# Patient Record
Sex: MALE | Race: WHITE | NOT HISPANIC OR LATINO | Employment: FULL TIME | URBAN - METROPOLITAN AREA
[De-identification: names, ages, dates, MRNs, and addresses within clinical notes are randomized per-mention and may not be internally consistent; named-entity substitution may affect disease eponyms.]

---

## 2017-01-30 ENCOUNTER — ALLSCRIPTS OFFICE VISIT (OUTPATIENT)
Dept: OTHER | Facility: OTHER | Age: 52
End: 2017-01-30

## 2017-01-30 ENCOUNTER — GENERIC CONVERSION - ENCOUNTER (OUTPATIENT)
Dept: OTHER | Facility: OTHER | Age: 52
End: 2017-01-30

## 2017-01-30 LAB — HBA1C MFR BLD HPLC: 9.1 %

## 2017-10-09 ENCOUNTER — ALLSCRIPTS OFFICE VISIT (OUTPATIENT)
Dept: OTHER | Facility: OTHER | Age: 52
End: 2017-10-09

## 2017-10-09 ENCOUNTER — GENERIC CONVERSION - ENCOUNTER (OUTPATIENT)
Dept: OTHER | Facility: OTHER | Age: 52
End: 2017-10-09

## 2017-10-09 DIAGNOSIS — J18.9 PNEUMONIA: ICD-10-CM

## 2017-10-09 DIAGNOSIS — E11.9 TYPE 2 DIABETES MELLITUS WITHOUT COMPLICATIONS (HCC): ICD-10-CM

## 2017-10-10 ENCOUNTER — HOSPITAL ENCOUNTER (OUTPATIENT)
Dept: RADIOLOGY | Facility: HOSPITAL | Age: 52
Discharge: HOME | End: 2017-10-10
Payer: COMMERCIAL

## 2017-10-10 ENCOUNTER — TRANSCRIBE ORDERS (OUTPATIENT)
Dept: ADMINISTRATIVE | Facility: HOSPITAL | Age: 52
End: 2017-10-10

## 2017-10-10 DIAGNOSIS — J18.9 PNEUMONIA: ICD-10-CM

## 2017-10-10 DIAGNOSIS — E11.9 TYPE 2 DIABETES MELLITUS WITHOUT COMPLICATIONS (HCC): ICD-10-CM

## 2017-10-10 PROCEDURE — 71020 HB CHEST X-RAY 2VW FRONTAL&LATL: CPT

## 2017-10-11 LAB
BASOPHILS # BLD AUTO: 0 %
BASOPHILS # BLD AUTO: 0 X10E3/UL (ref 0–0.2)
BUN SERPL-MCNC: 24 MG/DL (ref 6–24)
BUN/CREA RATIO (HISTORICAL): 26 (ref 9–20)
CALCIUM SERPL-MCNC: 8.2 MG/DL (ref 8.7–10.2)
CHLORIDE SERPL-SCNC: 84 MMOL/L (ref 96–106)
CO2 SERPL-SCNC: 23 MMOL/L (ref 18–29)
CREAT SERPL-MCNC: 0.94 MG/DL (ref 0.76–1.27)
DEPRECATED RDW RBC AUTO: 13.3 % (ref 12.3–15.4)
EGFR AFRICAN AMERICAN (HISTORICAL): 106 ML/MIN/1.73
EGFR-AMERICAN CALC (HISTORICAL): 92 ML/MIN/1.73
EOSINOPHIL # BLD AUTO: 0.1 X10E3/UL (ref 0–0.4)
EOSINOPHIL # BLD AUTO: 1 %
GLUCOSE SERPL-MCNC: 389 MG/DL (ref 65–99)
HCT VFR BLD AUTO: 41.3 % (ref 37.5–51)
HGB BLD-MCNC: 14 G/DL (ref 12.6–17.7)
IMM.GRANULOCYTES (CD4/8) (HISTORICAL): 0 %
IMM.GRANULOCYTES (CD4/8) (HISTORICAL): 0 X10E3/UL (ref 0–0.1)
LYMPHOCYTES # BLD AUTO: 1 X10E3/UL (ref 0.7–3.1)
LYMPHOCYTES # BLD AUTO: 16 %
MCH RBC QN AUTO: 29.5 PG (ref 26.6–33)
MCHC RBC AUTO-ENTMCNC: 33.9 G/DL (ref 31.5–35.7)
MCV RBC AUTO: 87 FL (ref 79–97)
MONOCYTES # BLD AUTO: 0.9 X10E3/UL (ref 0.1–0.9)
MONOCYTES (HISTORICAL): 14 %
NEUTROPHILS # BLD AUTO: 4.4 X10E3/UL (ref 1.4–7)
NEUTROPHILS # BLD AUTO: 69 %
PLATELET # BLD AUTO: 247 X10E3/UL (ref 150–379)
POTASSIUM SERPL-SCNC: 3.8 MMOL/L (ref 3.5–5.2)
RBC (HISTORICAL): 4.75 X10E6/UL (ref 4.14–5.8)
SODIUM SERPL-SCNC: 126 MMOL/L (ref 134–144)
WBC # BLD AUTO: 6.3 X10E3/UL (ref 3.4–10.8)

## 2017-10-23 ENCOUNTER — GENERIC CONVERSION - ENCOUNTER (OUTPATIENT)
Dept: OTHER | Facility: OTHER | Age: 52
End: 2017-10-23

## 2017-11-24 LAB
CREAT ?TM UR-SCNC: 75.3 UMOL/L
HBA1C MFR BLD HPLC: 11.6 %
MICROALBUMIN UR-MCNC: 6.9 MG/L (ref 0–20)
MICROALBUMIN/CREAT UR: 9.2 MG/G{CREAT}

## 2018-01-10 NOTE — RESULT NOTES
Verified Results  * XR CHEST PA & LATERAL [id number] 12:43PM Andrey Santizo Order Number: [id number]   Performing Comments: Please call report to Dr Eric Stokes Name Result Flag Reference   XR CHEST PA & LATERAL (Report)     CHEST DUAL ENERGY     INDICATION: Fever  Former smoker  COMPARISON: None     VIEWS: PA (including soft tissue and bone algorithms) and lateral projections; 4 images     FINDINGS:        Cardiomediastinal silhouette appears unremarkable  And right lower lobe consolidation consistent with pneumonia  No pneumothorax or pleural effusion  Visualized osseous structures appear within normal limits for the patient's age  IMPRESSION:     Right lower lobe pneumonia  ##imslh##imslh       Workstation performed: [id number]     Signed by:    Cornelia Perez MD   10/10/17

## 2018-01-13 NOTE — RESULT NOTES
Verified Results  (1) CBC/PLT/DIFF 10Oct2017 01:29PM Alfreda Halo     Test Name Result Flag Reference   WBC 6 3 x10E3/uL  3 4-10 8   RBC 4 75 x10E6/uL  4 14-5 80   Hemoglobin 14 0 g/dL  12 6-17 7   Hematocrit 41 3 %  37 5-51 0   MCV 87 fL  79-97   MCH 29 5 pg  26 6-33 0   MCHC 33 9 g/dL  31 5-35 7   RDW 13 3 %  12 3-15 4   Platelets 336 L13A5/CC  150-379   Neutrophils 69 %  Not Estab  Lymphs 16 %  Not Estab  Monocytes 14 %  Not Estab  Eos 1 %  Not Estab  Basos 0 %  Not Estab  Neutrophils (Absolute) 4 4 x10E3/uL  1 4-7 0   Lymphs (Absolute) 1 0 x10E3/uL  0 7-3 1   Monocytes(Absolute) 0 9 x10E3/uL  0 1-0 9   Eos (Absolute) 0 1 x10E3/uL  0 0-0 4   Baso (Absolute) 0 0 x10E3/uL  0 0-0 2   Immature Granulocytes 0 %  Not Estab     Immature Grans (Abs) 0 0 x10E3/uL  0 0-0 1     (1) BASIC METABOLIC PROFILE [id number] 01:29PM Alfreda Halo     Test Name Result Flag Reference   Glucose, Serum 389 mg/dL H 65-99   BUN 24 mg/dL  6-24   Creatinine, Serum 0 94 mg/dL  0 76-1 27   BUN/Creatinine Ratio 26 H 9-20   Sodium, Serum 126 mmol/L L 134-144   Potassium, Serum 3 8 mmol/L  3 5-5 2   Chloride, Serum 84 mmol/L L    Carbon Dioxide, Total 23 mmol/L  18-29   Calcium, Serum 8 2 mg/dL L 8 7-10 2   eGFR If NonAfricn Am 92 mL/min/1 73  >59   eGFR If Africn Am 106 mL/min/1 73  >59

## 2018-01-14 VITALS
DIASTOLIC BLOOD PRESSURE: 76 MMHG | HEIGHT: 71 IN | WEIGHT: 176 LBS | BODY MASS INDEX: 24.64 KG/M2 | SYSTOLIC BLOOD PRESSURE: 142 MMHG | OXYGEN SATURATION: 97 % | HEART RATE: 100 BPM

## 2018-01-15 NOTE — RESULT NOTES
Verified Results  * XR SPINE LUMBAR MINIMUM 4 VIEWS [id number] 01:10PM Fawn Hinton Order Number: [id number]     Test Name Result Flag Reference   XR SPINE LUMBAR MINIMUM 4 VIEWS (Report)     LUMBAR SPINE     INDICATION: Lower back pain  Sciatica, pain into right leg for 4 weeks     COMPARISON: None     VIEWS: AP, lateral, bilateral oblique and coned down projections; 5 images     FINDINGS:     Alignment is unremarkable  There is no radiographic evidence of acute fracture or destructive osseous lesion  Minimal degenerative change in the posterior facets  Lumbarization of S1  Visualized soft tissues appear unremarkable  IMPRESSION:     No acute findings         Workstation performed: [id number]     Signed by:   Val Acosta MD   7/6/16

## 2018-01-22 VITALS
SYSTOLIC BLOOD PRESSURE: 140 MMHG | HEIGHT: 71 IN | BODY MASS INDEX: 22.68 KG/M2 | WEIGHT: 162 LBS | HEART RATE: 104 BPM | DIASTOLIC BLOOD PRESSURE: 100 MMHG | OXYGEN SATURATION: 96 % | TEMPERATURE: 99 F | RESPIRATION RATE: 18 BRPM

## 2018-02-07 DIAGNOSIS — Z13.220 NEED FOR LIPID SCREENING: ICD-10-CM

## 2018-02-07 DIAGNOSIS — F41.0 PANIC DISORDER: Primary | ICD-10-CM

## 2018-02-07 DIAGNOSIS — Z12.5 SCREENING PSA (PROSTATE SPECIFIC ANTIGEN): ICD-10-CM

## 2018-02-07 DIAGNOSIS — E11.9 TYPE 2 DIABETES MELLITUS WITHOUT COMPLICATION, WITHOUT LONG-TERM CURRENT USE OF INSULIN (HCC): ICD-10-CM

## 2018-02-07 DIAGNOSIS — I10 HYPERTENSION, UNSPECIFIED TYPE: Primary | ICD-10-CM

## 2018-02-07 DIAGNOSIS — E78.00 HIGH CHOLESTEROL: ICD-10-CM

## 2018-02-07 PROBLEM — E11.69 ERECTILE DYSFUNCTION ASSOCIATED WITH TYPE 2 DIABETES MELLITUS (HCC): Status: ACTIVE | Noted: 2017-01-30

## 2018-02-07 PROBLEM — N52.1 ERECTILE DYSFUNCTION ASSOCIATED WITH TYPE 2 DIABETES MELLITUS (HCC): Status: ACTIVE | Noted: 2017-01-30

## 2018-02-07 RX ORDER — IMIPRAMINE HCL 50 MG
1 TABLET ORAL 2 TIMES DAILY
COMMUNITY
Start: 2016-06-09 | End: 2018-02-07 | Stop reason: SDUPTHER

## 2018-02-07 RX ORDER — IMIPRAMINE HCL 50 MG
50 TABLET ORAL 2 TIMES DAILY
Qty: 60 TABLET | Refills: 0 | Status: SHIPPED | OUTPATIENT
Start: 2018-02-07 | End: 2018-04-02 | Stop reason: SDUPTHER

## 2018-02-07 NOTE — TELEPHONE ENCOUNTER
Please sign to send to pharmacy    Please add dx, not sure why pt taking med- did not reach pt second time to ask    Pt due labs and ov - called and spoke to pt  Apt sched 3/5, pt will have labs prior

## 2018-02-14 DIAGNOSIS — E11.9 DIABETES MELLITUS WITHOUT COMPLICATION (HCC): Primary | ICD-10-CM

## 2018-03-29 ENCOUNTER — TELEPHONE (OUTPATIENT)
Dept: FAMILY MEDICINE CLINIC | Facility: CLINIC | Age: 53
End: 2018-03-29

## 2018-03-29 LAB
ALBUMIN SERPL-MCNC: 4.3 G/DL (ref 3.5–5.5)
ALBUMIN/CREAT UR: 5.7 MG/G CREAT (ref 0–30)
ALBUMIN/GLOB SERPL: 1.6 {RATIO} (ref 1.2–2.2)
ALP SERPL-CCNC: 60 IU/L (ref 39–117)
ALT SERPL-CCNC: 29 IU/L (ref 0–44)
AST SERPL-CCNC: 21 IU/L (ref 0–40)
BILIRUB SERPL-MCNC: 0.5 MG/DL (ref 0–1.2)
BUN SERPL-MCNC: 16 MG/DL (ref 6–24)
BUN/CREAT SERPL: 15 (ref 9–20)
CALCIUM SERPL-MCNC: 9.3 MG/DL (ref 8.7–10.2)
CHLORIDE SERPL-SCNC: 95 MMOL/L (ref 96–106)
CHOLEST SERPL-MCNC: 200 MG/DL (ref 100–199)
CO2 SERPL-SCNC: 22 MMOL/L (ref 18–29)
CREAT SERPL-MCNC: 1.04 MG/DL (ref 0.76–1.27)
CREAT UR-MCNC: 81.3 MG/DL
EST. AVERAGE GLUCOSE BLD GHB EST-MCNC: 289 MG/DL
GLOBULIN SER-MCNC: 2.7 G/DL (ref 1.5–4.5)
GLUCOSE SERPL-MCNC: 328 MG/DL (ref 65–99)
HBA1C MFR BLD: 11.7 % (ref 4.8–5.6)
HDLC SERPL-MCNC: 44 MG/DL
LDLC SERPL CALC-MCNC: 116 MG/DL (ref 0–99)
LDLC/HDLC SERPL: 2.6 RATIO UNITS (ref 0–3.6)
MICROALBUMIN UR-MCNC: 4.6 UG/ML
POTASSIUM SERPL-SCNC: 4.7 MMOL/L (ref 3.5–5.2)
PROT SERPL-MCNC: 7 G/DL (ref 6–8.5)
PSA SERPL-MCNC: 0.4 NG/ML (ref 0–4)
SL AMB EGFR AFRICAN AMERICAN: 93 ML/MIN/1.73
SL AMB EGFR NON AFRICAN AMERICAN: 80 ML/MIN/1.73
SL AMB VLDL CHOLESTEROL CALC: 40 MG/DL (ref 5–40)
SODIUM SERPL-SCNC: 135 MMOL/L (ref 134–144)
TRIGL SERPL-MCNC: 199 MG/DL (ref 0–149)

## 2018-03-29 NOTE — PROGRESS NOTES
Results reviewed-will discuss at scheduled appointment-ask patient if he is seeing the endocrinologist

## 2018-03-29 NOTE — TELEPHONE ENCOUNTER
PM -- VOLODYMYR    Saw Dudley Jamison 12/1/17, will be following up with Dr Susu Narvaez on 4/30/18 -- has appoinment with PM on 4/2/18      ----- Message from Vidhi Triana MD sent at 3/29/2018  8:30 AM EDT -----  Results reviewed-will discuss at scheduled appointment-ask patient if he is seeing the endocrinologist

## 2018-04-02 ENCOUNTER — OFFICE VISIT (OUTPATIENT)
Dept: FAMILY MEDICINE CLINIC | Facility: CLINIC | Age: 53
End: 2018-04-02
Payer: COMMERCIAL

## 2018-04-02 VITALS
DIASTOLIC BLOOD PRESSURE: 90 MMHG | WEIGHT: 173 LBS | RESPIRATION RATE: 16 BRPM | HEIGHT: 71 IN | BODY MASS INDEX: 24.22 KG/M2 | OXYGEN SATURATION: 99 % | SYSTOLIC BLOOD PRESSURE: 150 MMHG | HEART RATE: 100 BPM

## 2018-04-02 DIAGNOSIS — E11.69 ERECTILE DYSFUNCTION ASSOCIATED WITH TYPE 2 DIABETES MELLITUS (HCC): ICD-10-CM

## 2018-04-02 DIAGNOSIS — Z00.00 ENCOUNTER FOR WELLNESS EXAMINATION: Primary | ICD-10-CM

## 2018-04-02 DIAGNOSIS — N52.1 ERECTILE DYSFUNCTION ASSOCIATED WITH TYPE 2 DIABETES MELLITUS (HCC): ICD-10-CM

## 2018-04-02 DIAGNOSIS — Z23 NEED FOR DIPHTHERIA-TETANUS-PERTUSSIS (TDAP) VACCINE: ICD-10-CM

## 2018-04-02 DIAGNOSIS — E11.69 HYPERLIPIDEMIA ASSOCIATED WITH TYPE 2 DIABETES MELLITUS (HCC): ICD-10-CM

## 2018-04-02 DIAGNOSIS — E78.5 HYPERLIPIDEMIA ASSOCIATED WITH TYPE 2 DIABETES MELLITUS (HCC): ICD-10-CM

## 2018-04-02 DIAGNOSIS — F41.0 PANIC DISORDER: ICD-10-CM

## 2018-04-02 DIAGNOSIS — Z11.59 ENCOUNTER FOR HEPATITIS C SCREENING TEST FOR LOW RISK PATIENT: ICD-10-CM

## 2018-04-02 PROCEDURE — 99396 PREV VISIT EST AGE 40-64: CPT | Performed by: FAMILY MEDICINE

## 2018-04-02 PROCEDURE — 90715 TDAP VACCINE 7 YRS/> IM: CPT

## 2018-04-02 PROCEDURE — 90471 IMMUNIZATION ADMIN: CPT

## 2018-04-02 PROCEDURE — 99214 OFFICE O/P EST MOD 30 MIN: CPT | Performed by: FAMILY MEDICINE

## 2018-04-02 RX ORDER — LISINOPRIL 10 MG/1
TABLET ORAL
COMMUNITY
Start: 2016-06-09 | End: 2019-02-14 | Stop reason: SDUPTHER

## 2018-04-02 RX ORDER — IMIPRAMINE HCL 50 MG
50 TABLET ORAL 2 TIMES DAILY
Qty: 60 TABLET | Refills: 0 | Status: SHIPPED | OUTPATIENT
Start: 2018-04-02 | End: 2018-05-08 | Stop reason: SDUPTHER

## 2018-04-02 RX ORDER — SILDENAFIL 100 MG/1
TABLET, FILM COATED ORAL
COMMUNITY
Start: 2017-01-30 | End: 2018-04-02 | Stop reason: ALTCHOICE

## 2018-04-02 RX ORDER — VARDENAFIL HYDROCHLORIDE 20 MG/1
20 TABLET ORAL DAILY PRN
Qty: 6 TABLET | Refills: 3 | Status: SHIPPED | OUTPATIENT
Start: 2018-04-02 | End: 2019-06-20 | Stop reason: ALTCHOICE

## 2018-04-02 RX ORDER — ATORVASTATIN CALCIUM 40 MG/1
1 TABLET, FILM COATED ORAL DAILY
COMMUNITY
Start: 2016-06-09 | End: 2018-08-23 | Stop reason: SDUPTHER

## 2018-04-02 NOTE — PROGRESS NOTES
Assessment/Plan:    No problem-specific Assessment & Plan notes found for this encounter  Diagnoses and all orders for this visit:    Encounter for wellness examination    Encounter for hepatitis C screening test for low risk patient  -     Hepatitis C antibody; Future  -     Hepatitis C antibody    Need for diphtheria-tetanus-pertussis (Tdap) vaccine  -     Tdap vaccine greater than or equal to 8yo IM    Hyperlipidemia associated with type 2 diabetes mellitus (HCC)    Erectile dysfunction associated with type 2 diabetes mellitus (HCC)  -     vardenafil (LEVITRA) 20 MG tablet; Take 1 tablet (20 mg total) by mouth daily as needed for erectile dysfunction    Panic disorder  -     imipramine (TOFRANIL) 50 mg tablet; Take 1 tablet (50 mg total) by mouth 2 (two) times a day    Other orders  -     atorvastatin (LIPITOR) 40 mg tablet; Take 1 tablet by mouth daily  -     insulin detemir (LEVEMIR FLEXTOUCH) subcutaneous injection pen 100 units/mL; Inject under the skin  -     insulin aspart (NOVOLOG FLEXPEN) 100 Units/mL SOPN; Inject under the skin  -     lisinopril (ZESTRIL) 10 mg tablet; Take by mouth  -     Discontinue: sildenafil (VIAGRA) 100 mg tablet; Take by mouth  -     Insulin Pen Needle (B-D UF III MINI PEN NEEDLES) 31G X 5 MM MISC; by Does not apply route       Medical management - I will forward your laboratory studies to Endocrinology further advice as to adjusting medications  Try to be more compliant with your diet  Also be more compliant with taking your lisinopril on a regular basis as your blood pressure is high  Goal should be less than 130/80  Subjective:   Chief Complaint   Patient presents with    Follow-up     mm - review labs, med refill    discuss meds    Physical Exam               Patient ID: Anant Campbell is a 52 y o  male  Medical management multiple problems-  1) diabetes mellitus-currently seeing endo- A1C at 11%-this is not surprising fasting sugars are usually 200    He admits does not follow his mealtime insulin due to difficulty with testing when on the road  2) hypertension slightly elevated today-patient not taking medication routinely  3) hyperlipidemia-triglycerides are elevated most likely related to poor diabetic control total cholesterol is not too bad   4) anxiety with panic attacks-controlled with a murmur per mean once daily  5) erectile dysfunction-patient has lost effect with Viagra wishes to try Levitra as a replacement  The following portions of the patient's history were reviewed and updated as appropriate: allergies, current medications, past family history, past medical history, past social history and problem list     Review of Systems   Constitutional: Negative for activity change, appetite change, diaphoresis and fatigue  Respiratory: Negative for cough, chest tightness, shortness of breath and wheezing  Cardiovascular: Negative for chest pain, palpitations and leg swelling  Fast or slow heart rate   Gastrointestinal: Negative for abdominal pain, blood in stool, constipation, diarrhea, nausea and vomiting  Genitourinary: Negative for difficulty urinating, dysuria and frequency  Musculoskeletal: Negative for arthralgias, gait problem, joint swelling and myalgias  Neurological: Negative for dizziness, weakness, light-headedness, numbness and headaches  Psychiatric/Behavioral: Negative for agitation, confusion, dysphoric mood and sleep disturbance  The patient is not nervous/anxious  Objective:      /90 (BP Location: Right arm, Patient Position: Sitting, Cuff Size: Standard)   Pulse 100   Resp 16   Ht 5' 11" (1 803 m)   Wt 78 5 kg (173 lb)   SpO2 99%   BMI 24 13 kg/m²          Physical Exam   Constitutional: He is oriented to person, place, and time  He appears well-developed and well-nourished  No distress  HENT:   Head: Normocephalic and atraumatic     Right Ear: Tympanic membrane, external ear and ear canal normal    Left Ear: Tympanic membrane, external ear and ear canal normal    Nose: No mucosal edema or rhinorrhea  Right sinus exhibits no maxillary sinus tenderness  Left sinus exhibits no maxillary sinus tenderness  Mouth/Throat: Uvula is midline  Mucous membranes are not pale and not dry  Normal dentition  No oropharyngeal exudate  Eyes: EOM are normal  Pupils are equal, round, and reactive to light  Right eye exhibits no discharge  Left eye exhibits no discharge  Neck: Normal range of motion  Neck supple  No thyromegaly present  Cardiovascular: Normal rate, regular rhythm, normal heart sounds and intact distal pulses  No murmur heard  Pulmonary/Chest: Effort normal and breath sounds normal  No respiratory distress  He has no wheezes  He has no rales  Musculoskeletal: Normal range of motion  He exhibits no edema or tenderness  Lymphadenopathy:     He has no cervical adenopathy  Neurological: He is alert and oriented to person, place, and time  No cranial nerve deficit  Skin: He is not diaphoretic  Psychiatric: He has a normal mood and affect   His behavior is normal

## 2018-04-02 NOTE — PROGRESS NOTES
HPI:  Mary Arteaga is a 52 y o  male here for his yearly health maintenance exam    Patient Active Problem List   Diagnosis    HTN (hypertension)    High cholesterol    [facility] [address] [facility]    [facility]abetic retinopathy [facility] [address] [facility]    Erectile dysfunction associated with type 2 diabetes mellitus ([facility] [address] [facility]    Panic disorder    Encounter for wellness examination     No past medical history on file  1  Advanced Directive: yes     2  Durable Power of  for Healthcare: yes     3  Social History:               Marital History:               Work Status: full time              Drug and alcohol History: no drug use              Alcohol Use: <1/month     4  General Health: fair              Regular Dental Visits:yes              Vision problems:DM retinopathy              Hearing Loss:no              Immunizations up to date: no                 Lifestyle:                           Healthy Diet:yes                          Tobacco Use:former- quit 2007                          Regular exercise:yes                          Weight concerns:no                          Drug abuse: no     5  Reproductive Health (females only)              Premenopausal:-              Perimenopausal:-              Postmenopausal: -                 Menstrual Problems: -              Contraceptions: -              Sexually Active: -              Pregnancy History:-     6   Over the past 2 weeks, how often have you been bothered by the following:              Little interest or pleasure in doing things: not at all              Felling down, depressed or hopeless: not at all    Current Outpatient Prescriptions   Medication Sig Dispense Refill    atorvastatin (LIPITOR) 40 mg tablet Take 1 tablet by mouth daily      insulin aspart (NOVOLOG FLEXPEN) 100 Units/mL SOPN Inject under the skin      insulin detemir (LEVEMIR FLEXTOUCH) subcutaneous injection pen 100 units/mL Inject under the skin      Insulin Pen Needle (B-D UF III MINI PEN NEEDLES) 31G X 5 MM MISC by Does not apply route      lisinopril (ZESTRIL) 10 mg tablet Take by mouth      sildenafil (VIAGRA) 100 mg tablet Take by mouth      imipramine (TOFRANIL) 50 mg tablet Take 1 tablet (50 mg total) by mouth 2 (two) times a day 60 tablet 0     No current facility-administered medications for this visit  No Known Allergies  Immunization History   Administered Date(s) Administered    Influenza TIV (IM) 09/02/2014    Pneumococcal Polysaccharide PPV23 01/30/2017       Patient Care Team:  Joe Arreaga MD as PCP - General      Physical Exam :  Physical Exam  /90 (BP Location: Right arm, Patient Position: Sitting, Cuff Size: Standard)   Pulse 100   Resp 16   Ht 5' 11" (1 803 m)   Wt 78 5 kg (173 lb)   SpO2 99%   BMI 24 13 kg/m²     General Appearance:    Alert, cooperative, no distress, appears stated age   Head:    Normocephalic, without obvious abnormality, atraumatic   Eyes:    PERRL, conjunctiva/corneas clear, EOM's intact, fundi     benign, both eyes        Ears:    Normal TM's and external ear canals, both ears   Nose:   Nares normal, septum midline, mucosa normal, no drainage    or sinus tenderness   Throat:   Lips, mucosa, and tongue normal; teeth and gums normal   Neck:   Supple, symmetrical, trachea midline, no adenopathy;        thyroid:  No enlargement/tenderness/nodules; no carotid    bruit or JVD   Lungs:     Clear to auscultation bilaterally, respirations unlabored   Chest wall:    No tenderness or deformity   Heart:    Regular rate and rhythm, S1 and S2 normal, no murmur, rub   or gallop   Abdomen:     Soft, non-tender, bowel sounds active all four quadrants,     no masses, no organomegaly   Extremities:   Extremities normal, atraumatic, no cyanosis or edema   Pulses:   2+ and symmetric all extremities   Skin:   Skin color, texture, turgor normal, no rashes or lesions   Lymph nodes:   Cervical and supraclavicular nodes normal   Neurologic:   CNII-XII intact   Normal strength, sensation and reflexes       throughout         Reviewed Updated [facility] Prior Wellness Visits:   Last Health Maintenance visit information was reviewed, patient interviewed , no change since last HM visit yes  Last HM visit information was reviewed, patient interviewed and updates made to the record today yes    Assessment and Plan:  1  Encounter for wellness examination     2  Encounter for hepatitis C screening test for low risk patient     3  Need for diphtheria-tetanus-pertussis (Tdap) vaccine         Health Maintenance Due   Topic Date Due    HIV SCREENING  1965    Hepatitis C Screening  1965    COLONOSCOPY  1965    Depression Screening PHQ-9  01/23/1975    DTaP,Tdap,and Td Vaccines (1 - Tdap) 01/23/1984    COLON CANCER SCREENING ANNUAL FOBT  01/23/2013    OPHTHALMOLOGY EXAM  07/15/2017    INFLUENZA VACCINE  09/01/2017    Diabetic Foot Exam  01/30/2018      Patient Instructions   Metabolic screens are current  Your due for a tetanus shot which will give tonight  We discussed colonoscopy which you have declined but will agree to do a stool sample for blood  Yearly eye exams with you diabetes  He agreed hepatitis C screening which will be a blood test with her next laboratory testing  Depression screen is negative  PSA was within acceptable range

## 2018-04-02 NOTE — PATIENT INSTRUCTIONS
Metabolic screens are current  Your due for a tetanus shot which will give tonight  We discussed colonoscopy which you have declined but will agree to do a stool sample for blood  Yearly eye exams with you diabetes  He agreed hepatitis C screening which will be a blood test with her next laboratory testing  Depression screen is negative  PSA was within acceptable range  Medical management - I will forward your laboratory studies to Endocrinology further advice as to adjusting medications  Try to be more compliant with your diet  Also be more compliant with taking your lisinopril on a regular basis as your blood pressure is high  Goal should be less than 130/80

## 2018-04-03 DIAGNOSIS — Z12.11 ENCOUNTER FOR SCREENING FECAL OCCULT BLOOD TESTING: Primary | ICD-10-CM

## 2018-04-27 ENCOUNTER — TELEPHONE (OUTPATIENT)
Dept: FAMILY MEDICINE CLINIC | Facility: CLINIC | Age: 53
End: 2018-04-27

## 2018-04-27 NOTE — TELEPHONE ENCOUNTER
REF LINE VM:  APT WITH   [facility] -[phone]  Bettie Almazan

## 2018-04-27 NOTE — TELEPHONE ENCOUNTER
Referral #: [id number]  Effective: 04/27/2018  Expires: 07/25/2018    Referral [id number] has been successfully submitted

## 2018-04-30 ENCOUNTER — OFFICE VISIT (OUTPATIENT)
Dept: ENDOCRINOLOGY | Facility: HOSPITAL | Age: 53
End: 2018-04-30
Payer: COMMERCIAL

## 2018-04-30 VITALS
DIASTOLIC BLOOD PRESSURE: 74 MMHG | HEART RATE: 72 BPM | BODY MASS INDEX: 25 KG/M2 | SYSTOLIC BLOOD PRESSURE: 126 MMHG | HEIGHT: 70 IN | WEIGHT: 174.6 LBS

## 2018-04-30 DIAGNOSIS — E11.319 TYPE 2 DIABETES MELLITUS WITH RETINOPATHY, WITH LONG-TERM CURRENT USE OF INSULIN, MACULAR EDEMA PRESENCE UNSPECIFIED, UNSPECIFIED LATERALITY, UNSPECIFIED RETINOPATHY SEVERITY (HCC): ICD-10-CM

## 2018-04-30 DIAGNOSIS — I10 ESSENTIAL HYPERTENSION: Primary | ICD-10-CM

## 2018-04-30 DIAGNOSIS — Z79.4 TYPE 2 DIABETES MELLITUS WITH RETINOPATHY, WITH LONG-TERM CURRENT USE OF INSULIN, MACULAR EDEMA PRESENCE UNSPECIFIED, UNSPECIFIED LATERALITY, UNSPECIFIED RETINOPATHY SEVERITY (HCC): ICD-10-CM

## 2018-04-30 PROCEDURE — 99204 OFFICE O/P NEW MOD 45 MIN: CPT | Performed by: INTERNAL MEDICINE

## 2018-04-30 RX ORDER — FLASH GLUCOSE SENSOR
1 KIT MISCELLANEOUS AS NEEDED
Qty: 3 EACH | Refills: 6 | Status: SHIPPED | OUTPATIENT
Start: 2018-04-30 | End: 2018-11-08 | Stop reason: SDUPTHER

## 2018-04-30 RX ORDER — ASPIRIN 81 MG/1
81 TABLET ORAL DAILY
COMMUNITY

## 2018-04-30 RX ORDER — ASCORBIC ACID 500 MG
500 TABLET ORAL DAILY
COMMUNITY

## 2018-04-30 RX ORDER — FLASH GLUCOSE SENSOR
1 KIT MISCELLANEOUS AS NEEDED
Qty: 1 DEVICE | Refills: 0 | Status: SHIPPED | OUTPATIENT
Start: 2018-04-30 | End: 2019-05-21 | Stop reason: CLARIF

## 2018-04-30 NOTE — LETTER
April 30, 2018     Srikanth Wynn MD  [address]  [place] [address]    Patient: Anant Cmapbell   YOB: 1965   Date of Visit: 4/30/2018       Dear Dr Ricke Mcburney:    Thank you for referring Anant Campbell to me for evaluation  Below are my notes for this consultation  If you have questions, please do not hesitate to call me  I look forward to following your patient along with you  Sincerely,        Junior Alyce DO        CC: No Recipients  Junior Alyce DO  4/30/2018  8:43 AM  Sign at close encounter  4/30/2018    Assessment/Plan      Diagnoses and all orders for this visit:    Essential hypertension  -     Comprehensive metabolic panel Lab Collect; Future  -     Comprehensive metabolic panel Lab Collect    Type 2 diabetes mellitus with retinopathy, with long-term current use of insulin, macular edema presence unspecified, unspecified laterality, unspecified retinopathy severity ([address] )  -     Continuous Blood Gluc  (FREESTYLE ALEXIS READER) DAHLIA; 1 each by Does not apply route as needed (as directed)  -     Continuous Blood Gluc Sensor ([address] [place] [address]) MISC; 1 each by Does not apply route as needed (every 10 days)  -     insulin aspart (NOVOLOG FLEXPEN) 100 Units/mL SOPN; 10 units 5 times daily  -     insulin detemir (LEVEMIR FLEXTOUCH) subcutaneous injection pen 100 units/mL; Inject 24 Units under the skin 2 (two) times a day  -     HEMOGLOBIN A1C W/ EAG ESTIMATION Lab Collect; Future  -     Comprehensive metabolic panel Lab Collect; Future  -     Microalbumin / creatinine urine ratio- Lab Collect; Future  -     Lipid Panel with Direct LDL reflex Lab Collect; Future  -     HEMOGLOBIN A1C W/ EAG ESTIMATION Lab Collect  -     Comprehensive metabolic panel Lab Collect  -     Microalbumin / creatinine urine ratio- Lab Collect  -     Lipid Panel with Direct LDL reflex Lab Collect    Other orders  -     aspirin (ECOTRIN LOW STRENGTH) 81 mg EC tablet;  Take 81 mg by mouth daily  -     ascorbic acid (VITAMIN C) 500 mg tablet; Take 500 mg by mouth daily        Assessment/Plan:  1  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:  Encouraged patient to follow up with eye doctor for routine exam   No data to make any changes today so he should continue Levemir 24 units twice a day and NovoLog 10 units with meals  I discussed that he should keep his carb intake consistent since his NovoLog dose is consistent between meals  He was interested in the freestyle jeremy continuous glucose monitoring system so I have prescribed that as this would increased the amount of times he will check his sugar and the amount of data that I will get to make adjustments in his regimen  He will send in blood sugar logs in 2 weeks for review  Follow-up in 3 months with an A1c, CMP, lipids, urine microalbumin to creatinine ratio just prior  2   Hypertension:  Controlled  3   Hyperlipidemia:  Suspect this will improve with improved blood sugar control  Check lipids before next visit  CC: Diabetes Consult    History of Present Illness     HPI: Tai Sanchez is a 52y o  year old male with type 2 diabetes for 18 years  He is on insulin at home and takes Levemir 24 units bid, Novolog 10 with a meal  He denies any polyuria, polydipsia, nocturia and blurry vision  He denies neuropathy but does admit to neuropathy and retinopathy  Hypoglycemic episodes: No but has icing  The patient has not had an eye exam in the past year  The patient's last foot exam was in December 2017  Blood Sugar/Glucometer/Pump/CGM review: No logs to review today  Review of Systems   Constitutional: Negative for chills, fatigue and fever  HENT: Negative for trouble swallowing and voice change  Eyes: Negative for visual disturbance  Respiratory: Negative for shortness of breath  Cardiovascular: Negative for chest pain, palpitations and leg swelling     Gastrointestinal: Negative for abdominal pain, nausea and vomiting  Endocrine: Negative for polydipsia and polyuria  Musculoskeletal: Negative for arthralgias and myalgias  Skin: Negative for rash  Neurological: Negative for dizziness, tremors and weakness  Hematological: Negative for adenopathy  Psychiatric/Behavioral: Negative for agitation and confusion  Historical Information   History reviewed  No pertinent past medical history  History reviewed  No pertinent surgical history  Social History   History   Alcohol Use    Yes     History   Drug Use No     History   Smoking Status    Former Smoker    Types: Cigarettes    Quit date: 4/2/2008   Smokeless Tobacco    Never Used     Family History:   Family History   Problem Relation Age of Onset    Adopted:  Yes    Family history unknown: Yes       Meds/Allergies   Current Outpatient Prescriptions   Medication Sig Dispense Refill    ascorbic acid (VITAMIN C) 500 mg tablet Take 500 mg by mouth daily      aspirin (ECOTRIN LOW STRENGTH) 81 mg EC tablet Take 81 mg by mouth daily      atorvastatin (LIPITOR) 40 mg tablet Take 1 tablet by mouth daily      imipramine (TOFRANIL) 50 mg tablet Take 1 tablet (50 mg total) by mouth 2 (two) times a day 60 tablet 0    insulin aspart (NOVOLOG FLEXPEN) 100 Units/mL SOPN 10 units 5 times daily 10 pen 5    insulin detemir (LEVEMIR FLEXTOUCH) subcutaneous injection pen 100 units/mL Inject 24 Units under the skin 2 (two) times a day 10 pen 5    Insulin Pen Needle (B-D UF III MINI PEN NEEDLES) 31G X 5 MM MISC by Does not apply route      lisinopril (ZESTRIL) 10 mg tablet Take by mouth      vardenafil (LEVITRA) 20 MG tablet Take 1 tablet (20 mg total) by mouth daily as needed for erectile dysfunction 6 tablet 3    Continuous Blood Gluc  (FREESTYLE ALEXIS READER) DAHLIA 1 each by Does not apply route as needed (as directed) 1 Device 0    Continuous Blood Gluc Sensor (FREESTYLE ALEXIS SENSOR SYSTEM) MISC 1 each by Does not apply route as needed (every 10 days) 3 each 6     No current facility-administered medications for this visit  No Known Allergies    Objective   Vitals: Blood pressure 126/74, pulse 72, height 5' 10" (1 778 m), weight 79 2 kg (174 lb 9 6 oz)  Invasive Devices          No matching active lines, drains, or airways          Physical Exam   Constitutional: He is oriented to person, place, and time  He appears well-developed and well-nourished  No distress  HENT:   Head: Normocephalic and atraumatic  Mouth/Throat: No oropharyngeal exudate  Eyes: Conjunctivae and EOM are normal  Pupils are equal, round, and reactive to light  No scleral icterus  Neck: Normal range of motion  Neck supple  No thyromegaly present  Cardiovascular: Normal rate and regular rhythm  No murmur heard  Pulmonary/Chest: Effort normal and breath sounds normal  He has no wheezes  Abdominal: Soft  Bowel sounds are normal  He exhibits no distension  There is no tenderness  Musculoskeletal: Normal range of motion  He exhibits no edema  Neurological: He is alert and oriented to person, place, and time  He exhibits normal muscle tone  Skin: Skin is warm and dry  No rash noted  He is not diaphoretic  Psychiatric: He has a normal mood and affect  His behavior is normal        The history was obtained from the review of the chart and from the patient      Lab Results:    Most recent Alc is  Lab Results   Component Value Date    HGBA1C 11 7 (H) 03/28/2018             Lab Results   Component Value Date    CREATININE 1 04 03/28/2018    CREATININE 0 94 10/10/2017    BUN 16 03/28/2018     (L) 10/10/2017    K 3 8 10/10/2017    CL 84 (L) 10/10/2017    CO2 23 10/10/2017     Lab Results   Component Value Date    TRIG 199 (H) 03/28/2018       No results found for: ALT, AST, GGT, ALKPHOS, BILITOT    No results found for: TSH, FREET4, TSI    Recent Results (from the past 16968 hour(s))   BASIC METABOLIC PANEL (HISTORICAL) Collection Time: 10/10/17  1:29 PM   Result Value Ref Range    Glucose 389 (H) 65 - 99 mg/dL    BUN 24 6 - 24 mg/dL    Creatinine 0 94 0 76 - 1 27 mg/dL    BUN/CREA Ratio 26 (H) 9 - 20    Sodium 126 (L) 134 - 144 mmol/L    Potassium 3 8 3 5 - 5 2 mmol/L    Chloride 84 (L) 96 - 106 mmol/L    CO2 23 18 - 29 mmol/L    Calcium 8 2 (L) 8 7 - 10 2 mg/dL    EGFR-AMERICAN CALC 92 >59 mL/min/1 73    eGFR  106 >59 mL/min/1 73   CBC AND DIFFERENTIAL (HISTORICAL)    Collection Time: 10/10/17  1:29 PM   Result Value Ref Range    WBC 6 3 3 4 - 10 8 x10E3/uL    RBC 4 75 4 14 - 5 80 x10E6/uL    Hemoglobin 14 0 12 6 - 17 7 g/dL    Hematocrit 41 3 37 5 - 51 0 %    MCV 87 79 - 97 fL    MCH 29 5 26 6 - 33 0 pg    MCHC 33 9 31 5 - 35 7 g/dL    RDW 13 3 12 3 - 15 4 %    Platelets 445 329 - 840 x10E3/uL    Neutrophils Absolute 69 Not Estab  %    Lymphocytes Absolute 16 Not Estab  %    MONOCYTES 14 Not Estab  %    Eosinophils Absolute 1 Not Estab  %    Basophils Absolute 0 Not Estab  %    Neutrophils Absolute 4 4 1 4 - 7 0 x10E3/uL    Lymphocytes Absolute 1 0 0 7 - 3 1 x10E3/uL    Monocytes Absolute 0 9 0 1 - 0 9 x10E3/uL    Eosinophils Absolute 0 1 0 0 - 0 4 x10E3/uL    Basophils Absolute 0 0 0 0 - 0 2 x10E3/uL    IMM  GRANULOCYTES (CD4/8) 0 Not Estab  %    IMM  GRANULOCYTES (CD4/8) 0 0 0 0 - 0 1 x10E3/uL   Comprehensive metabolic panel    Collection Time: 03/28/18  8:55 AM   Result Value Ref Range    SL AMB GLUCOSE 328 (H) 65 - 99 mg/dL    BUN 16 6 - 24 mg/dL    Creatinine, Serum 1 04 0 76 - 1 27 mg/dL    eGFR Non African American 80 >59 mL/min/1 73    SL AMB EGFR AFRICAN AMERICAN 93 >59 mL/min/1 73    SL AMB BUN/CREATININE RATIO 15 9 - 20    SL AMB SODIUM 135 134 - 144 mmol/L    SL AMB POTASSIUM 4 7 3 5 - 5 2 mmol/L    SL AMB CHLORIDE 95 (L) 96 - 106 mmol/L    SL AMB CARBON DIOXIDE 22 18 - 29 mmol/L    CALCIUM 9 3 8 7 - 10 2 mg/dL    SL AMB PROTEIN, TOTAL 7 0 6 0 - 8 5 g/dL    Serum Albumin 4 3 3 5 - 5 5 g/dL    Globulin, Total 2 7 1 5 - 4 5 g/dL    SL AMB ALBUMIN/GLOBULIN RATIO 1 6 1 2 - 2 2    SL AMB BILIRUBIN, TOTAL 0 5 0 0 - 1 2 mg/dL    Alk Phos Isoenzymes 60 39 - 117 IU/L    SL AMB AST 21 0 - 40 IU/L    SL AMB ALT 29 0 - 44 IU/L   Lipid Panel with Direct LDL reflex    Collection Time: 03/28/18  8:55 AM   Result Value Ref Range    Cholesterol, Total 200 (H) 100 - 199 mg/dL    Triglycerides 199 (H) 0 - 149 mg/dL    SL AMB HDL CHOLESTEROL 44 >39 mg/dL    SL AMB VLDL CHOLESTEROL CALC 40 5 - 40 mg/dL    SL AMB LDL-CHOLESTEROL 116 (H) 0 - 99 mg/dL    LDl/HDL Ratio 2 6 0 0 - 3 6 ratio units   HEMOGLOBIN A1C W/ EAG ESTIMATION    Collection Time: 03/28/18  8:55 AM   Result Value Ref Range    Hemoglobin A1C 11 7 (H) 4 8 - 5 6 %    Estimated Average Glucose 289 mg/dL   PSA Total, Diagnostic    Collection Time: 03/28/18  8:55 AM   Result Value Ref Range    Prostate Specific Antigen Total 0 4 0 0 - 4 0 ng/mL   Microalbumin / creatinine urine ratio    Collection Time: 03/28/18  8:57 AM   Result Value Ref Range    Creatinine, Urine 81 3 Not Estab  mg/dL    Microalbum  ,U,Random 4 6 Not Estab  ug/mL    Microalb/Creat Ratio 5 7 0 0 - 30 0 mg/g creat         No future appointments

## 2018-04-30 NOTE — PROGRESS NOTES
4/30/2018    Assessment/Plan      Diagnoses and all orders for this visit:    Essential hypertension  -     Comprehensive metabolic panel Lab Collect; Future  -     Comprehensive metabolic panel Lab Collect    Type 2 diabetes mellitus with retinopathy, with long-term current use of insulin, macular edema presence unspecified, unspecified laterality, unspecified retinopathy severity ([address] )  -     Continuous Blood Gluc  (FREESTYLE ALEXIS READER) DAHLIA; 1 each by Does not apply route as needed (as directed)  -     Continuous Blood Gluc Sensor ([address] [place] [address]) MISC; 1 each by Does not apply route as needed (every 10 days)  -     insulin aspart (NOVOLOG FLEXPEN) 100 Units/mL SOPN; 10 units 5 times daily  -     insulin detemir (LEVEMIR FLEXTOUCH) subcutaneous injection pen 100 units/mL; Inject 24 Units under the skin 2 (two) times a day  -     HEMOGLOBIN A1C W/ EAG ESTIMATION Lab Collect; Future  -     Comprehensive metabolic panel Lab Collect; Future  -     Microalbumin / creatinine urine ratio- Lab Collect; Future  -     Lipid Panel with Direct LDL reflex Lab Collect; Future  -     HEMOGLOBIN A1C W/ EAG ESTIMATION Lab Collect  -     Comprehensive metabolic panel Lab Collect  -     Microalbumin / creatinine urine ratio- Lab Collect  -     Lipid Panel with Direct LDL reflex Lab Collect    Other orders  -     aspirin (ECOTRIN LOW STRENGTH) 81 mg EC tablet; Take 81 mg by mouth daily  -     ascorbic acid (VITAMIN C) 500 mg tablet; Take 500 mg by mouth daily        Assessment/Plan:  1  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:  Encouraged patient to follow up with eye doctor for routine exam   No data to make any changes today so he should continue Levemir 24 units twice a day and NovoLog 10 units with meals  I discussed that he should keep his carb intake consistent since his NovoLog dose is consistent between meals    He was interested in the freestyle alexis continuous glucose monitoring system so I have prescribed that as this would increased the amount of times he will check his sugar and the amount of data that I will get to make adjustments in his regimen  He will send in blood sugar logs in 2 weeks for review  Follow-up in 3 months with an A1c, CMP, lipids, urine microalbumin to creatinine ratio just prior  2   Hypertension:  Controlled  3   Hyperlipidemia:  Suspect this will improve with improved blood sugar control  Check lipids before next visit  CC: Diabetes Consult    History of Present Illness     HPI: Wes Sharp is a 52y o  year old male with type 2 diabetes for 18 years  He is on insulin at home and takes Levemir 24 units bid, Novolog 10 with a meal  He denies any polyuria, polydipsia, nocturia and blurry vision  He denies neuropathy but does admit to neuropathy and retinopathy  Hypoglycemic episodes: No but has icing  The patient has not had an eye exam in the past year  The patient's last foot exam was in December 2017  Blood Sugar/Glucometer/Pump/CGM review: No logs to review today  Review of Systems   Constitutional: Negative for chills, fatigue and fever  HENT: Negative for trouble swallowing and voice change  Eyes: Negative for visual disturbance  Respiratory: Negative for shortness of breath  Cardiovascular: Negative for chest pain, palpitations and leg swelling  Gastrointestinal: Negative for abdominal pain, nausea and vomiting  Endocrine: Negative for polydipsia and polyuria  Musculoskeletal: Negative for arthralgias and myalgias  Skin: Negative for rash  Neurological: Negative for dizziness, tremors and weakness  Hematological: Negative for adenopathy  Psychiatric/Behavioral: Negative for agitation and confusion  Historical Information   History reviewed  No pertinent past medical history  History reviewed  No pertinent surgical history    Social History   History   Alcohol Use    Yes     History Drug Use No     History   Smoking Status    Former Smoker    Types: Cigarettes    Quit date: 4/2/2008   Smokeless Tobacco    Never Used     Family History:   Family History   Problem Relation Age of Onset    Adopted: Yes    Family history unknown: Yes       Meds/Allergies   Current Outpatient Prescriptions   Medication Sig Dispense Refill    ascorbic acid (VITAMIN C) 500 mg tablet Take 500 mg by mouth daily      aspirin (ECOTRIN LOW STRENGTH) 81 mg EC tablet Take 81 mg by mouth daily      atorvastatin (LIPITOR) 40 mg tablet Take 1 tablet by mouth daily      imipramine (TOFRANIL) 50 mg tablet Take 1 tablet (50 mg total) by mouth 2 (two) times a day 60 tablet 0    insulin aspart (NOVOLOG FLEXPEN) 100 Units/mL SOPN 10 units 5 times daily 10 pen 5    insulin detemir (LEVEMIR FLEXTOUCH) subcutaneous injection pen 100 units/mL Inject 24 Units under the skin 2 (two) times a day 10 pen 5    Insulin Pen Needle (B-D UF III MINI PEN NEEDLES) 31G X 5 MM MISC by Does not apply route      lisinopril (ZESTRIL) 10 mg tablet Take by mouth      vardenafil (LEVITRA) 20 MG tablet Take 1 tablet (20 mg total) by mouth daily as needed for erectile dysfunction 6 tablet 3    Continuous Blood Gluc  (FREESTYLE ALEXIS READER) DAHLIA 1 each by Does not apply route as needed (as directed) 1 Device 0    Continuous Blood Gluc Sensor ([address] [place] [address]) MISC 1 each by Does not apply route as needed (every 10 days) 3 each 6     No current facility-administered medications for this visit  No Known Allergies    Objective   Vitals: Blood pressure 126/74, pulse 72, height 5' 10" (1 778 m), weight 79 2 kg (174 lb 9 6 oz)  Invasive Devices          No matching active lines, drains, or airways          Physical Exam   Constitutional: He is oriented to person, place, and time  He appears well-developed and well-nourished  No distress  HENT:   Head: Normocephalic and atraumatic     Mouth/Throat: No oropharyngeal exudate  Eyes: Conjunctivae and EOM are normal  Pupils are equal, round, and reactive to light  No scleral icterus  Neck: Normal range of motion  Neck supple  No thyromegaly present  Cardiovascular: Normal rate and regular rhythm  No murmur heard  Pulmonary/Chest: Effort normal and breath sounds normal  He has no wheezes  Abdominal: Soft  Bowel sounds are normal  He exhibits no distension  There is no tenderness  Musculoskeletal: Normal range of motion  He exhibits no edema  Neurological: He is alert and oriented to person, place, and time  He exhibits normal muscle tone  Skin: Skin is warm and dry  No rash noted  He is not diaphoretic  Psychiatric: He has a normal mood and affect  His behavior is normal        The history was obtained from the review of the chart and from the patient      Lab Results:    Most recent Alc is  Lab Results   Component Value Date    HGBA1C 11 7 (H) 03/28/2018             Lab Results   Component Value Date    CREATININE 1 04 03/28/2018    CREATININE 0 94 10/10/2017    BUN 16 03/28/2018     (L) 10/10/2017    K 3 8 10/10/2017    CL 84 (L) 10/10/2017    CO2 23 10/10/2017     Lab Results   Component Value Date    TRIG 199 (H) 03/28/2018       No results found for: ALT, AST, GGT, ALKPHOS, BILITOT    No results found for: TSH, FREET4, TSI    Recent Results (from the past 76028 hour(s))   BASIC METABOLIC PANEL (HISTORICAL)    Collection Time: 10/10/17  1:29 PM   Result Value Ref Range    Glucose 389 (H) 65 - 99 mg/dL    BUN 24 6 - 24 mg/dL    Creatinine 0 94 0 76 - 1 27 mg/dL    BUN/CREA Ratio 26 (H) 9 - 20    Sodium 126 (L) 134 - 144 mmol/L    Potassium 3 8 3 5 - 5 2 mmol/L    Chloride 84 (L) 96 - 106 mmol/L    CO2 23 18 - 29 mmol/L    Calcium 8 2 (L) 8 7 - 10 2 mg/dL    EGFR-AMERICAN CALC 92 >59 mL/min/1 73    eGFR  106 >59 mL/min/1 73   CBC AND DIFFERENTIAL (HISTORICAL)    Collection Time: 10/10/17  1:29 PM   Result Value Ref Range    WBC 6 3 3 4 - 10 8 x10E3/uL    RBC 4 75 4 14 - 5 80 x10E6/uL    Hemoglobin 14 0 12 6 - 17 7 g/dL    Hematocrit 41 3 37 5 - 51 0 %    MCV 87 79 - 97 fL    MCH 29 5 26 6 - 33 0 pg    MCHC 33 9 31 5 - 35 7 g/dL    RDW 13 3 12 3 - 15 4 %    Platelets 599 320 - 442 x10E3/uL    Neutrophils Absolute 69 Not Estab  %    Lymphocytes Absolute 16 Not Estab  %    MONOCYTES 14 Not Estab  %    Eosinophils Absolute 1 Not Estab  %    Basophils Absolute 0 Not Estab  %    Neutrophils Absolute 4 4 1 4 - 7 0 x10E3/uL    Lymphocytes Absolute 1 0 0 7 - 3 1 x10E3/uL    Monocytes Absolute 0 9 0 1 - 0 9 x10E3/uL    Eosinophils Absolute 0 1 0 0 - 0 4 x10E3/uL    Basophils Absolute 0 0 0 0 - 0 2 x10E3/uL    IMM  GRANULOCYTES (CD4/8) 0 Not Estab  %    IMM  GRANULOCYTES (CD4/8) 0 0 0 0 - 0 1 x10E3/uL   Comprehensive metabolic panel    Collection Time: 03/28/18  8:55 AM   Result Value Ref Range    SL AMB GLUCOSE 328 (H) 65 - 99 mg/dL    BUN 16 6 - 24 mg/dL    Creatinine, Serum 1 04 0 76 - 1 27 mg/dL    eGFR Non African American 80 >59 mL/min/1 73    SL AMB EGFR AFRICAN AMERICAN 93 >59 mL/min/1 73    SL AMB BUN/CREATININE RATIO 15 9 - 20    SL AMB SODIUM 135 134 - 144 mmol/L    SL AMB POTASSIUM 4 7 3 5 - 5 2 mmol/L    SL AMB CHLORIDE 95 (L) 96 - 106 mmol/L    SL AMB CARBON DIOXIDE 22 18 - 29 mmol/L    CALCIUM 9 3 8 7 - 10 2 mg/dL    SL AMB PROTEIN, TOTAL 7 0 6 0 - 8 5 g/dL    Serum Albumin 4 3 3 5 - 5 5 g/dL    Globulin, Total 2 7 1 5 - 4 5 g/dL    SL AMB ALBUMIN/GLOBULIN RATIO 1 6 1 2 - 2 2    SL AMB BILIRUBIN, TOTAL 0 5 0 0 - 1 2 mg/dL    Alk Phos Isoenzymes 60 39 - 117 IU/L    SL AMB AST 21 0 - 40 IU/L    SL AMB ALT 29 0 - 44 IU/L   Lipid Panel with Direct LDL reflex    Collection Time: 03/28/18  8:55 AM   Result Value Ref Range    Cholesterol, Total 200 (H) 100 - 199 mg/dL    Triglycerides 199 (H) 0 - 149 mg/dL    SL AMB HDL CHOLESTEROL 44 >39 mg/dL    SL AMB VLDL CHOLESTEROL CALC 40 5 - 40 mg/dL    SL AMB LDL-CHOLESTEROL 116 (H) 0 - 99 mg/dL    LDl/HDL Ratio 2 6 0 0 - 3 6 ratio units   HEMOGLOBIN A1C W/ EAG ESTIMATION    Collection Time: 03/28/18  8:55 AM   Result Value Ref Range    Hemoglobin A1C 11 7 (H) 4 8 - 5 6 %    Estimated Average Glucose 289 mg/dL   PSA Total, Diagnostic    Collection Time: 03/28/18  8:55 AM   Result Value Ref Range    Prostate Specific Antigen Total 0 4 0 0 - 4 0 ng/mL   Microalbumin / creatinine urine ratio    Collection Time: 03/28/18  8:57 AM   Result Value Ref Range    Creatinine, Urine 81 3 Not Estab  mg/dL    Microalbum  ,U,Random 4 6 Not Estab  ug/mL    Microalb/Creat Ratio 5 7 0 0 - 30 0 mg/g creat         No future appointments

## 2018-05-08 DIAGNOSIS — F41.0 PANIC DISORDER: ICD-10-CM

## 2018-05-08 RX ORDER — IMIPRAMINE HCL 50 MG
50 TABLET ORAL 2 TIMES DAILY
Qty: 60 TABLET | Refills: 4 | Status: SHIPPED | OUTPATIENT
Start: 2018-05-08 | End: 2019-03-02 | Stop reason: SDUPTHER

## 2018-07-25 LAB
ALBUMIN SERPL-MCNC: 4.3 G/DL (ref 3.5–5.5)
ALBUMIN/CREAT UR: 2.8 MG/G CREAT (ref 0–30)
ALBUMIN/GLOB SERPL: 1.8 {RATIO} (ref 1.2–2.2)
ALP SERPL-CCNC: 60 IU/L (ref 39–117)
ALT SERPL-CCNC: 30 IU/L (ref 0–44)
AST SERPL-CCNC: 20 IU/L (ref 0–40)
BILIRUB SERPL-MCNC: 0.5 MG/DL (ref 0–1.2)
BUN SERPL-MCNC: 13 MG/DL (ref 6–24)
BUN/CREAT SERPL: 11 (ref 9–20)
CALCIUM SERPL-MCNC: 9.2 MG/DL (ref 8.7–10.2)
CHLORIDE SERPL-SCNC: 97 MMOL/L (ref 96–106)
CHOLEST SERPL-MCNC: 159 MG/DL (ref 100–199)
CO2 SERPL-SCNC: 25 MMOL/L (ref 20–29)
CREAT SERPL-MCNC: 1.14 MG/DL (ref 0.76–1.27)
CREAT UR-MCNC: 158.4 MG/DL
EST. AVERAGE GLUCOSE BLD GHB EST-MCNC: 194 MG/DL
GLOBULIN SER-MCNC: 2.4 G/DL (ref 1.5–4.5)
GLUCOSE SERPL-MCNC: 251 MG/DL (ref 65–99)
HBA1C MFR BLD: 8.4 % (ref 4.8–5.6)
HDLC SERPL-MCNC: 36 MG/DL
LDLC SERPL CALC-MCNC: 78 MG/DL (ref 0–99)
LDLC/HDLC SERPL: 2.2 RATIO (ref 0–3.6)
MICROALBUMIN UR-MCNC: 4.5 UG/ML
POTASSIUM SERPL-SCNC: 4.5 MMOL/L (ref 3.5–5.2)
PROT SERPL-MCNC: 6.7 G/DL (ref 6–8.5)
SL AMB EGFR AFRICAN AMERICAN: 83 ML/MIN/1.73
SL AMB EGFR NON AFRICAN AMERICAN: 72 ML/MIN/1.73
SL AMB VLDL CHOLESTEROL CALC: 45 MG/DL (ref 5–40)
SODIUM SERPL-SCNC: 138 MMOL/L (ref 134–144)
TRIGL SERPL-MCNC: 226 MG/DL (ref 0–149)

## 2018-07-26 ENCOUNTER — TELEPHONE (OUTPATIENT)
Dept: FAMILY MEDICINE CLINIC | Facility: CLINIC | Age: 53
End: 2018-07-26

## 2018-07-26 NOTE — TELEPHONE ENCOUNTER
DR [facility]OLDBER[facility]  APPT 7/31/18  [id number]  [facility]        Referral #: [id number] Effective: 07/26/2018 Expires: 10/23/2018

## 2018-07-31 ENCOUNTER — OFFICE VISIT (OUTPATIENT)
Dept: ENDOCRINOLOGY | Facility: HOSPITAL | Age: 53
End: 2018-07-31
Payer: COMMERCIAL

## 2018-07-31 VITALS
BODY MASS INDEX: 25.4 KG/M2 | WEIGHT: 177.4 LBS | HEIGHT: 70 IN | HEART RATE: 88 BPM | SYSTOLIC BLOOD PRESSURE: 120 MMHG | DIASTOLIC BLOOD PRESSURE: 74 MMHG

## 2018-07-31 DIAGNOSIS — I10 ESSENTIAL HYPERTENSION: ICD-10-CM

## 2018-07-31 DIAGNOSIS — E11.69 HYPERLIPIDEMIA ASSOCIATED WITH TYPE 2 DIABETES MELLITUS (HCC): ICD-10-CM

## 2018-07-31 DIAGNOSIS — Z79.4 TYPE 2 DIABETES MELLITUS WITH RETINOPATHY, WITH LONG-TERM CURRENT USE OF INSULIN, MACULAR EDEMA PRESENCE UNSPECIFIED, UNSPECIFIED LATERALITY, UNSPECIFIED RETINOPATHY SEVERITY (HCC): Primary | ICD-10-CM

## 2018-07-31 DIAGNOSIS — E78.5 HYPERLIPIDEMIA ASSOCIATED WITH TYPE 2 DIABETES MELLITUS (HCC): ICD-10-CM

## 2018-07-31 DIAGNOSIS — E11.319 TYPE 2 DIABETES MELLITUS WITH RETINOPATHY, WITH LONG-TERM CURRENT USE OF INSULIN, MACULAR EDEMA PRESENCE UNSPECIFIED, UNSPECIFIED LATERALITY, UNSPECIFIED RETINOPATHY SEVERITY (HCC): Primary | ICD-10-CM

## 2018-07-31 PROCEDURE — 99214 OFFICE O/P EST MOD 30 MIN: CPT | Performed by: NURSE PRACTITIONER

## 2018-07-31 NOTE — PROGRESS NOTES
Terri Boyce 52 y o  male MRN: [id number]    Encounter: [id number]      Assessment/Plan     Assessment: This is a 52y o -year-old male with type 2 diabetes with retinopathy and long-term insulin use, hypertension and hyperlipidemia  Plan:  1  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:  His most recent hemoglobin A1c is 8 4  Review of his Freestyle Jestine Smaller reveals hyperglycemia consistently throughout the day with extremely rare hypoglycemia  I have asked him to increase his Levemir dose to 30 units a m  and p m  and his NovoLog dose at breakfast to 17 units and 14 units at lunchtime and dinnertime with his sliding scale  He will continue to utilize the freestyle Belt and for the report to the office in 2 weeks for review and further adjustment, if necessary  He is scheduled for a diabetic eye exam in 3 weeks  Diabetic foot exam performed today in the office  I have asked him to consider following up with Podiatry for regular diabetic foot care  Check hemoglobin A1c prior to next visit        2  Hypertension:   He is normotensive in the office today  Continue lisinopril  Check comprehensive metabolic panel prior to next visit  3   Hyperlipidemia:   Continue atorvastatin  CC:  Type 2 Diabetes follow-up    History of Present Illness     HPI:  52y o  year old male with type 2 diabetes for 18 years  He is on insulin at home and takes Levemir 24 units bid, Novolog 15 units at breakfast and 12 at lunch and dinner with sliding scale  He denies any polyuria, polydipsia, nocturia and blurry vision  He denies neuropathy but does admit to neuropathy and retinopathy  He has very mild and rare hypoglycemic episodes         He is scheduled for his diabetic eye exam in August 2018, by his report   He complains of some discomfort to his feet intermittently but does not follow podiatry for regular diabetic foot care       Blood Sugar/Glucometer/Pump/CGM review:  Download of his personal roel Nevarez reveals higher blood sugars from 6:00 a m  through 10 p m  His most recent hemoglobin A1c from July 24, 2018 is 8 4  For his hypertension, he is treated with lisinopril 10 mg daily  His hyperlipidemia is treated with atorvastatin 40 mg daily  His triglycerides remain elevated on his most recent fasting lipid panel at 226  on July 24, 2018  Review of Systems   Constitutional: Negative  Negative for chills, fatigue and fever  HENT: Negative  Eyes: Negative for photophobia, pain, discharge, redness, itching and visual disturbance  Respiratory: Negative for cough and shortness of breath  Cardiovascular: Negative for chest pain and palpitations  Gastrointestinal: Negative for abdominal pain, constipation, diarrhea, nausea and vomiting  Endocrine: Negative for cold intolerance, heat intolerance, polydipsia, polyphagia and polyuria  Genitourinary: Negative  Musculoskeletal: Negative  Skin: Negative  Allergic/Immunologic: Negative  Neurological: Negative for dizziness, syncope, light-headedness and headaches  Hematological: Negative  Psychiatric/Behavioral: Negative  All other systems reviewed and are negative  Historical Information   No past medical history on file  No past surgical history on file  Social History   History   Alcohol Use    Yes     History   Drug Use No     History   Smoking Status    Former Smoker    Types: Cigarettes    Quit date: 4/2/2008   Smokeless Tobacco    Never Used     Family History:   Family History   Problem Relation Age of Onset    Adopted:  Yes    Family history unknown: Yes       Meds/Allergies   Current Outpatient Prescriptions   Medication Sig Dispense Refill    ascorbic acid (VITAMIN C) 500 mg tablet Take 500 mg by mouth daily      aspirin (ECOTRIN LOW STRENGTH) 81 mg EC tablet Take 81 mg by mouth daily      atorvastatin (LIPITOR) 40 mg tablet Take 1 tablet by mouth daily      Continuous Blood Gluc  (TouchOfModern ALEXIS READER) DAHLIA 1 each by Does not apply route as needed (as directed) 1 Device 0    Continuous Blood Gluc Sensor ([address] [place] [address]) MISC 1 each by Does not apply route as needed (every 10 days) 3 each 6    imipramine (TOFRANIL) 50 mg tablet TAKE 1 TABLET (50 MG TOTAL) BY MOUTH 2 (TWO) TIMES A DAY 60 tablet 4    insulin aspart (NOVOLOG FLEXPEN) 100 Units/mL SOPN 10 units 5 times daily 10 pen 5    insulin detemir (LEVEMIR FLEXTOUCH) subcutaneous injection pen 100 units/mL Inject 24 Units under the skin 2 (two) times a day 10 pen 5    Insulin Pen Needle (B-D UF III MINI PEN NEEDLES) 31G X 5 MM MISC by Does not apply route      lisinopril (ZESTRIL) 10 mg tablet Take by mouth      vardenafil (LEVITRA) 20 MG tablet Take 1 tablet (20 mg total) by mouth daily as needed for erectile dysfunction 6 tablet 3     No current facility-administered medications for this visit  No Known Allergies    Objective   Vitals: Blood pressure 120/74, pulse 88, height 5' 10" (1 778 m), weight 80 5 kg (177 lb 6 4 oz)  Physical Exam   Constitutional: He is oriented to person, place, and time  He appears well-developed and well-nourished  No distress  HENT:   Head: Normocephalic and atraumatic  Nose: Nose normal    Mouth/Throat: Oropharynx is clear and moist    Eyes: Conjunctivae and EOM are normal  Pupils are equal, round, and reactive to light  Right eye exhibits no discharge  Left eye exhibits no discharge  No scleral icterus  Neck: Normal range of motion  Neck supple  No JVD present  No tracheal deviation present  No thyromegaly present  Cardiovascular: Normal rate, regular rhythm, normal heart sounds and intact distal pulses  Pulses are no weak pulses  Pulses:       Dorsalis pedis pulses are 2+ on the right side, and 2+ on the left side  Pulmonary/Chest: Effort normal and breath sounds normal  No stridor  No respiratory distress  He has no wheezes  He has no rales   He exhibits no tenderness  Abdominal: Soft  Bowel sounds are normal  He exhibits no distension  There is no tenderness  Musculoskeletal: Normal range of motion  He exhibits no edema, tenderness or deformity  Feet:   Right Foot:   Skin Integrity: Negative for ulcer, skin breakdown, erythema, warmth, callus or dry skin  Left Foot:   Skin Integrity: Negative for ulcer, skin breakdown, erythema, warmth, callus or dry skin  Lymphadenopathy:     He has no cervical adenopathy  Neurological: He is alert and oriented to person, place, and time  He displays normal reflexes  No cranial nerve deficit  Coordination normal    Skin: Skin is warm and dry  No rash noted  No erythema  No pallor  Psychiatric: He has a normal mood and affect  His behavior is normal  Judgment and thought content normal    Vitals reviewed  Patient's shoes and socks removed  Right Foot/Ankle   Right Foot Inspection  Skin Exam: skin normal and skin intact no dry skin, no warmth, no callus, no erythema, no maceration, no abnormal color, no pre-ulcer, no ulcer and no callus                          Toe Exam: ROM and strength within normal limits  Sensory       Monofilament testing: intact  Vascular  Capillary refills: < 3 seconds  The right DP pulse is 2+  Left Foot/Ankle  Left Foot Inspection  Skin Exam: skin normal and skin intactno dry skin, no warmth, no erythema, no maceration, normal color, no pre-ulcer, no ulcer and no callus                         Toe Exam: ROM and strength within normal limits                   Sensory       Monofilament: intact  Vascular  Capillary refills: < 3 seconds  The left DP pulse is 2+  Assign Risk Category:  No deformity present; No loss of protective sensation;  No weak pulses       Risk: 0    Lab Results:   Lab Results   Component Value Date/Time    Hemoglobin A1C 8 4 (H) 07/24/2018 08:58 AM    Hemoglobin A1C 11 7 (H) 03/28/2018 08:55 AM    WBC 6 3 10/10/2017 01:29 PM    Hemoglobin 14 0 10/10/2017 01:29 PM Hematocrit 41 3 10/10/2017 01:29 PM    MCV 87 10/10/2017 01:29 PM    Platelets 542 35/77/9176 01:29 PM    BUN 13 07/24/2018 08:58 AM    BUN 16 03/28/2018 08:55 AM    BUN 24 10/10/2017 01:29 PM    Sodium 126 (L) 10/10/2017 01:29 PM    Potassium 3 8 10/10/2017 01:29 PM    Chloride 84 (L) 10/10/2017 01:29 PM    CO2 23 10/10/2017 01:29 PM    Creatinine 0 94 10/10/2017 01:29 PM    Creatinine, Serum 1 14 07/24/2018 08:58 AM    Creatinine, Serum 1 04 03/28/2018 08:55 AM    HDL 36 (L) 07/24/2018 08:58 AM    HDL 44 03/28/2018 08:55 AM    LDL Direct 78 07/24/2018 08:58 AM    LDL Direct 116 (H) 03/28/2018 08:55 AM    Triglycerides 226 (H) 07/24/2018 08:58 AM    Triglycerides 199 (H) 03/28/2018 08:55 AM     Portions of the record may have been created with voice recognition software  Occasional wrong word or "sound a like" substitutions may have occurred due to the inherent limitations of voice recognition software  Read the chart carefully and recognize, using context, where substitutions have occurred

## 2018-07-31 NOTE — PATIENT INSTRUCTIONS
Be mindful of diet  Exercise regularly and stay hydrated  Increase Levemir to 30 units a m  and p m     Increase breakfast NovoLog to 17 units  Increase lunchtime and dinnertime NovoLog to 14 units  Continue sliding scale of 1 unit for every 10 carbs  Continue to utilize the [employer] and send in blood sugar data in 2 weeks for review and further adjustment, if necessary  Continue lisinopril and atorvastatin  Follow up with Ophthalmology for diabetic eye exam     He is soda/ juice or glucose tabs with you when traveling to treat hypoglycemia  Consider follow-up with Podiatry for regular diabetic foot care  Obtain lab work as prescribed

## 2018-08-23 DIAGNOSIS — E78.5 HYPERLIPIDEMIA, UNSPECIFIED HYPERLIPIDEMIA TYPE: Primary | ICD-10-CM

## 2018-08-23 RX ORDER — ATORVASTATIN CALCIUM 40 MG/1
40 TABLET, FILM COATED ORAL DAILY
Qty: 30 TABLET | Refills: 3 | Status: SHIPPED | OUTPATIENT
Start: 2018-08-23 | End: 2019-02-14 | Stop reason: SDUPTHER

## 2018-11-03 LAB
ALBUMIN SERPL-MCNC: 4.1 G/DL (ref 3.5–5.5)
ALBUMIN/GLOB SERPL: 1.6 {RATIO} (ref 1.2–2.2)
ALP SERPL-CCNC: 66 IU/L (ref 39–117)
ALT SERPL-CCNC: 42 IU/L (ref 0–44)
AST SERPL-CCNC: 32 IU/L (ref 0–40)
BILIRUB SERPL-MCNC: 0.3 MG/DL (ref 0–1.2)
BUN SERPL-MCNC: 17 MG/DL (ref 6–24)
BUN/CREAT SERPL: 15 (ref 9–20)
CALCIUM SERPL-MCNC: 9 MG/DL (ref 8.7–10.2)
CHLORIDE SERPL-SCNC: 102 MMOL/L (ref 96–106)
CO2 SERPL-SCNC: 23 MMOL/L (ref 20–29)
CREAT SERPL-MCNC: 1.17 MG/DL (ref 0.76–1.27)
EST. AVERAGE GLUCOSE BLD GHB EST-MCNC: 200 MG/DL
GLOBULIN SER-MCNC: 2.6 G/DL (ref 1.5–4.5)
GLUCOSE SERPL-MCNC: 175 MG/DL (ref 65–99)
HBA1C MFR BLD: 8.6 % (ref 4.8–5.6)
POTASSIUM SERPL-SCNC: 4.6 MMOL/L (ref 3.5–5.2)
PROT SERPL-MCNC: 6.7 G/DL (ref 6–8.5)
SL AMB EGFR AFRICAN AMERICAN: 81 ML/MIN/1.73
SL AMB EGFR NON AFRICAN AMERICAN: 70 ML/MIN/1.73
SODIUM SERPL-SCNC: 141 MMOL/L (ref 134–144)

## 2018-11-08 ENCOUNTER — OFFICE VISIT (OUTPATIENT)
Dept: ENDOCRINOLOGY | Facility: HOSPITAL | Age: 53
End: 2018-11-08
Payer: COMMERCIAL

## 2018-11-08 ENCOUNTER — TELEPHONE (OUTPATIENT)
Dept: FAMILY MEDICINE CLINIC | Facility: CLINIC | Age: 53
End: 2018-11-08

## 2018-11-08 VITALS
BODY MASS INDEX: 26.71 KG/M2 | SYSTOLIC BLOOD PRESSURE: 142 MMHG | DIASTOLIC BLOOD PRESSURE: 84 MMHG | HEIGHT: 70 IN | WEIGHT: 186.6 LBS | HEART RATE: 78 BPM

## 2018-11-08 DIAGNOSIS — I10 ESSENTIAL HYPERTENSION: ICD-10-CM

## 2018-11-08 DIAGNOSIS — E11.319 TYPE 2 DIABETES MELLITUS WITH RETINOPATHY, WITH LONG-TERM CURRENT USE OF INSULIN, MACULAR EDEMA PRESENCE UNSPECIFIED, UNSPECIFIED LATERALITY, UNSPECIFIED RETINOPATHY SEVERITY (HCC): Primary | ICD-10-CM

## 2018-11-08 DIAGNOSIS — Z79.4 TYPE 2 DIABETES MELLITUS WITH RETINOPATHY, WITH LONG-TERM CURRENT USE OF INSULIN, MACULAR EDEMA PRESENCE UNSPECIFIED, UNSPECIFIED LATERALITY, UNSPECIFIED RETINOPATHY SEVERITY (HCC): Primary | ICD-10-CM

## 2018-11-08 DIAGNOSIS — E78.5 HYPERLIPIDEMIA, UNSPECIFIED HYPERLIPIDEMIA TYPE: ICD-10-CM

## 2018-11-08 PROCEDURE — 99214 OFFICE O/P EST MOD 30 MIN: CPT | Performed by: NURSE PRACTITIONER

## 2018-11-08 RX ORDER — FLASH GLUCOSE SENSOR
1 KIT MISCELLANEOUS AS NEEDED
Qty: 3 EACH | Refills: 3 | Status: SHIPPED | OUTPATIENT
Start: 2018-11-08 | End: 2019-02-14 | Stop reason: SDUPTHER

## 2018-11-08 NOTE — PATIENT INSTRUCTIONS
Be mindful of diet  Exercise regularly and stay hydrated  Increase Levemir to 35 units a m  and p m      Increase breakfast NovoLog to 20 units  Increase lunchtime and dinnertime NovoLog to 14 units  Continue sliding scale  Continue to utilize the [employer]  Please come by the office in approximately 2-3 weeks for download of your [employer]  Contact the office with any consistent hypoglycemia  Continue lisinopril and atorvastatin

## 2018-11-08 NOTE — TELEPHONE ENCOUNTER
Referral #: [id number] Effective: 11/08/2018 Expires: 02/05/2019       debora  appt today  [facility]

## 2018-11-08 NOTE — PROGRESS NOTES
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:  His most recent hemoglobin A1c is 8 6  Review of his Freestyle Leighton Stain reveals hyperglycemia consistently throughout the day with no hypoglycemia  I have asked him to increase his Levemir dose to 35 units a m  and p m  and his NovoLog dose at breakfast to 20 units and 14 units at lunchtime and dinnertime with his sliding scale  He will continue to utilize the freestyle Sargent and for the report to the office in 2 weeks for review and further adjustment, if necessary  I have encouraged him to consider following up with Podiatry for regular diabetic foot care  Check hemoglobin A1c prior to next visit         2   Hypertension:   He is normotensive in the office today  Continue lisinopril  Check comprehensive metabolic panel prior to next visit      3   Hyperlipidemia:   Continue atorvastatin  Discussed the relationship between hyperglycemia and high triglyceride levels  CC:   Type 2 Diabetes follow-up    History of Present Illness     HPI:  52 y  o  year old male with type 2 diabetes for 18 years   He is on insulin at home and takes Levemir 30 units twice daily, Novolog 17 units at breakfast and 12 at lunch and dinner with sliding scale  He denies any recent episodes of hypoglycemia, polyuria, polydipsia, nocturia and blurry vision   He denies neuropathy but does admit to neuropathy and retinopathy  He is scheduled for his diabetic eye exam next week, by his report  He complains of some discomfort to his feet intermittently but does not follow podiatry for regular diabetic foot care    Diabetic foot exam performed at the last office visit      Blood Sugar/Glucometer/Pump/CGM review:  Download of his personal freeyle Geoffrey Mosquera reveals higher blood sugars throughout the day with an average glucose of 181  His most recent hemoglobin A1c from November 2, 2018 is 8  6      For his hypertension, he is treated with lisinopril 10 mg daily      His hyperlipidemia is treated with atorvastatin 40 mg daily  Review of Systems   Constitutional: Negative  Negative for chills, fatigue and fever  HENT: Negative  Negative for trouble swallowing and voice change  Eyes: Negative for photophobia, pain, discharge, redness, itching and visual disturbance  Respiratory: Negative for cough and shortness of breath  Cardiovascular: Negative for chest pain and palpitations  Gastrointestinal: Negative for abdominal pain, constipation, diarrhea, nausea and vomiting  Endocrine: Negative for cold intolerance, heat intolerance, polydipsia, polyphagia and polyuria  Genitourinary: Negative  Musculoskeletal: Negative  Skin: Negative  Allergic/Immunologic: Negative  Neurological: Negative for dizziness, syncope, light-headedness and headaches  Hematological: Negative  Psychiatric/Behavioral: Negative  All other systems reviewed and are negative  Historical Information   No past medical history on file  No past surgical history on file  Social History   History   Alcohol Use    Yes     History   Drug Use No     History   Smoking Status    Former Smoker    Types: Cigarettes    Quit date: 4/2/2008   Smokeless Tobacco    Never Used     Family History:   Family History   Problem Relation Age of Onset    Adopted:  Yes    Family history unknown: Yes       Meds/Allergies   Current Outpatient Prescriptions   Medication Sig Dispense Refill    Continuous Blood Gluc  (FREESTYLE ALEXIS READER) DAHLIA 1 each by Does not apply route as needed (as directed) 1 Device 0    Continuous Blood Gluc Sensor ([address] [place] [address]) MIS 1 each by Does not apply route as needed (every 10 days) 3 each 6    insulin aspart (NOVOLOG FLEXPEN) 100 Units/mL SOPN 10 units 5 times daily 10 pen 5    insulin detemir (LEVEMIR FLEXTOUCH) subcutaneous injection pen 100 units/mL Inject 24 Units under the skin 2 (two) times a day 10 pen 5    Insulin Pen Needle (B-D UF III MINI PEN NEEDLES) 31G X 5 MM MISC by Does not apply route      ascorbic acid (VITAMIN C) 500 mg tablet Take 500 mg by mouth daily      aspirin (ECOTRIN LOW STRENGTH) 81 mg EC tablet Take 81 mg by mouth daily      atorvastatin (LIPITOR) 40 mg tablet Take 1 tablet (40 mg total) by mouth daily (Patient not taking: Reported on 11/8/2018 ) 30 tablet 3    imipramine (TOFRANIL) 50 mg tablet TAKE 1 TABLET (50 MG TOTAL) BY MOUTH 2 (TWO) TIMES A DAY (Patient not taking: Reported on 11/8/2018 ) 60 tablet 4    lisinopril (ZESTRIL) 10 mg tablet Take by mouth      vardenafil (LEVITRA) 20 MG tablet Take 1 tablet (20 mg total) by mouth daily as needed for erectile dysfunction (Patient not taking: Reported on 11/8/2018 ) 6 tablet 3     No current facility-administered medications for this visit  No Known Allergies    Objective   Vitals: Blood pressure 142/84, pulse 78, height 5' 10" (1 778 m), weight 84 6 kg (186 lb 9 6 oz)  Physical Exam   Constitutional: He is oriented to person, place, and time  He appears well-developed and well-nourished  No distress  HENT:   Head: Normocephalic and atraumatic  Nose: Nose normal    Mouth/Throat: Oropharynx is clear and moist    Eyes: Pupils are equal, round, and reactive to light  Conjunctivae and EOM are normal  Right eye exhibits no discharge  Left eye exhibits no discharge  No scleral icterus  Neck: Normal range of motion  Neck supple  No JVD present  No tracheal deviation present  No thyromegaly present  Cardiovascular: Normal rate, regular rhythm, normal heart sounds and intact distal pulses  Exam reveals no gallop and no friction rub  No murmur heard    Pulmonary/Chest: Effort normal and breath sounds normal  No stridor  No respiratory distress  He has no wheezes  He has no rales  He exhibits no tenderness  Abdominal: Soft  Bowel sounds are normal  He exhibits no distension and no mass  There is no tenderness  There is no rebound and no guarding  Musculoskeletal: Normal range of motion  He exhibits no edema, tenderness or deformity  Lymphadenopathy:     He has no cervical adenopathy  Neurological: He is alert and oriented to person, place, and time  He displays normal reflexes  No cranial nerve deficit  Coordination normal    Skin: Skin is warm and dry  No rash noted  No erythema  No pallor  Dry skin   Psychiatric: He has a normal mood and affect  His behavior is normal  Judgment and thought content normal    Vitals reviewed  Lab Results:   Lab Results   Component Value Date/Time    Hemoglobin A1C 8 6 (H) 11/02/2018 08:08 AM    Hemoglobin A1C 8 4 (H) 07/24/2018 08:58 AM    Hemoglobin A1C 11 7 (H) 03/28/2018 08:55 AM    BUN 17 11/02/2018 08:08 AM    BUN 13 07/24/2018 08:58 AM    BUN 16 03/28/2018 08:55 AM    Potassium 4 6 11/02/2018 08:08 AM    Potassium 4 5 07/24/2018 08:58 AM    Potassium 4 7 03/28/2018 08:55 AM    Chloride 102 11/02/2018 08:08 AM    Chloride 97 07/24/2018 08:58 AM    Chloride 95 (L) 03/28/2018 08:55 AM    CO2 23 11/02/2018 08:08 AM    CO2 25 07/24/2018 08:58 AM    CO2 22 03/28/2018 08:55 AM    Albumin 4 1 11/02/2018 08:08 AM    Albumin 4 3 07/24/2018 08:58 AM    Albumin 4 3 03/28/2018 08:55 AM    Globulin, Total 2 6 11/02/2018 08:08 AM    Globulin, Total 2 4 07/24/2018 08:58 AM    Globulin, Total 2 7 03/28/2018 08:55 AM    HDL 36 (L) 07/24/2018 08:58 AM    HDL 44 03/28/2018 08:55 AM    Triglycerides 226 (H) 07/24/2018 08:58 AM    Triglycerides 199 (H) 03/28/2018 08:55 AM     Portions of the record may have been created with voice recognition software  Occasional wrong word or "sound a like" substitutions may have occurred due to the inherent limitations of voice recognition software   Read the chart carefully and recognize, using context, where substitutions have occurred

## 2018-11-14 LAB
LEFT EYE DIABETIC RETINOPATHY: NORMAL
RIGHT EYE DIABETIC RETINOPATHY: NORMAL
SEVERITY (EYE EXAM): NORMAL

## 2019-02-07 ENCOUNTER — TELEPHONE (OUTPATIENT)
Dept: FAMILY MEDICINE CLINIC | Facility: CLINIC | Age: 54
End: 2019-02-07

## 2019-02-07 NOTE — TELEPHONE ENCOUNTER
Has appointment with [facility] for Diabetes & Endocrinology for 2/12/19  States this is his routine every 3 month visit  To call him with any questions

## 2019-02-08 LAB
ALBUMIN SERPL-MCNC: 4.4 G/DL (ref 3.5–5.5)
ALBUMIN/GLOB SERPL: 1.9 {RATIO} (ref 1.2–2.2)
ALP SERPL-CCNC: 53 IU/L (ref 39–117)
ALT SERPL-CCNC: 33 IU/L (ref 0–44)
AST SERPL-CCNC: 23 IU/L (ref 0–40)
BILIRUB SERPL-MCNC: 0.4 MG/DL (ref 0–1.2)
BUN SERPL-MCNC: 18 MG/DL (ref 6–24)
BUN/CREAT SERPL: 16 (ref 9–20)
CALCIUM SERPL-MCNC: 9.1 MG/DL (ref 8.7–10.2)
CHLORIDE SERPL-SCNC: 100 MMOL/L (ref 96–106)
CO2 SERPL-SCNC: 25 MMOL/L (ref 20–29)
CREAT SERPL-MCNC: 1.12 MG/DL (ref 0.76–1.27)
EST. AVERAGE GLUCOSE BLD GHB EST-MCNC: 180 MG/DL
GLOBULIN SER-MCNC: 2.3 G/DL (ref 1.5–4.5)
GLUCOSE SERPL-MCNC: 230 MG/DL (ref 65–99)
HBA1C MFR BLD: 7.9 % (ref 4.8–5.6)
POTASSIUM SERPL-SCNC: 4.6 MMOL/L (ref 3.5–5.2)
PROT SERPL-MCNC: 6.7 G/DL (ref 6–8.5)
SL AMB EGFR AFRICAN AMERICAN: 84 ML/MIN/1.73
SL AMB EGFR NON AFRICAN AMERICAN: 73 ML/MIN/1.73
SODIUM SERPL-SCNC: 138 MMOL/L (ref 134–144)

## 2019-02-08 NOTE — TELEPHONE ENCOUNTER
Referral #: [id number]  Effective: 02/08/2019  Expires: 05/08/2019    Referral [id number] has been successfully submitted

## 2019-02-14 ENCOUNTER — OFFICE VISIT (OUTPATIENT)
Dept: ENDOCRINOLOGY | Facility: HOSPITAL | Age: 54
End: 2019-02-14
Payer: COMMERCIAL

## 2019-02-14 VITALS
WEIGHT: 185 LBS | BODY MASS INDEX: 26.48 KG/M2 | HEART RATE: 95 BPM | SYSTOLIC BLOOD PRESSURE: 120 MMHG | HEIGHT: 70 IN | DIASTOLIC BLOOD PRESSURE: 80 MMHG

## 2019-02-14 DIAGNOSIS — I10 ESSENTIAL HYPERTENSION: ICD-10-CM

## 2019-02-14 DIAGNOSIS — Z79.4 TYPE 2 DIABETES MELLITUS WITH RETINOPATHY, WITH LONG-TERM CURRENT USE OF INSULIN, MACULAR EDEMA PRESENCE UNSPECIFIED, UNSPECIFIED LATERALITY, UNSPECIFIED RETINOPATHY SEVERITY (HCC): Primary | ICD-10-CM

## 2019-02-14 DIAGNOSIS — E11.319 TYPE 2 DIABETES MELLITUS WITH RETINOPATHY, WITH LONG-TERM CURRENT USE OF INSULIN, MACULAR EDEMA PRESENCE UNSPECIFIED, UNSPECIFIED LATERALITY, UNSPECIFIED RETINOPATHY SEVERITY (HCC): Primary | ICD-10-CM

## 2019-02-14 DIAGNOSIS — E11.319 DIABETIC RETINOPATHY ASSOCIATED WITH TYPE 2 DIABETES MELLITUS, MACULAR EDEMA PRESENCE UNSPECIFIED, UNSPECIFIED LATERALITY, UNSPECIFIED RETINOPATHY SEVERITY (HCC): ICD-10-CM

## 2019-02-14 DIAGNOSIS — E78.5 HYPERLIPIDEMIA, UNSPECIFIED HYPERLIPIDEMIA TYPE: ICD-10-CM

## 2019-02-14 PROCEDURE — 95251 CONT GLUC MNTR ANALYSIS I&R: CPT | Performed by: NURSE PRACTITIONER

## 2019-02-14 PROCEDURE — 99214 OFFICE O/P EST MOD 30 MIN: CPT | Performed by: NURSE PRACTITIONER

## 2019-02-14 RX ORDER — FLASH GLUCOSE SENSOR
1 KIT MISCELLANEOUS AS NEEDED
Qty: 3 EACH | Refills: 3 | Status: SHIPPED | OUTPATIENT
Start: 2019-02-14 | End: 2019-05-21 | Stop reason: CLARIF

## 2019-02-14 RX ORDER — LISINOPRIL 10 MG/1
10 TABLET ORAL DAILY
Qty: 30 TABLET | Refills: 6 | Status: SHIPPED | OUTPATIENT
Start: 2019-02-14 | End: 2019-12-27 | Stop reason: SDUPTHER

## 2019-02-14 RX ORDER — ATORVASTATIN CALCIUM 40 MG/1
40 TABLET, FILM COATED ORAL DAILY
Qty: 30 TABLET | Refills: 6 | Status: SHIPPED | OUTPATIENT
Start: 2019-02-14 | End: 2020-06-03

## 2019-02-14 NOTE — PATIENT INSTRUCTIONS
Be mindful of diet       Exercise regularly and stay hydrated       Continue Levemir to 35 units in the evening  Increase Levemir to 40 in the morning        Continue breakfast NovoLog to 20 units       Increase lunchtime and dinnertime NovoLog to 16 units       Continue sliding scale  Make sure you are injecting insulin consistently      Continue to utilize the freestyle jeremy      Please come by the office in approximately 2-3 weeks for download of your freestyle jeremy      Contact the office with any consistent hypoglycemia      Continue lisinopril and atorvastatin  Obtain lab work prior to next visit

## 2019-02-14 NOTE — PROGRESS NOTES
Katia Sears 62 y o  male MRN: [id number]    Encounter: [id number]      Assessment/Plan     Assessment: This is a 62y o -year-old male with type 2 diabetes with retinopathy and long-term insulin use, hypertension and hyperlipidemia  Plan:  1   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:  His most recent hemoglobin A1c is slightly more improved at 7 9   Review of his Freestyle Adry download report reveals hyperglycemia consistently throughout the day with no hypoglycemia   I have asked him to increase his Levemir dose to 40  units in the morning and continue 35 units in the evening  His NovoLog dose at breakfast will remain the same at 20 units and increased slightly to 16  units at lunchtime and dinnertime with his sliding scale   He will continue to utilize the freestyle adry and for the report to the office in 2 weeks for review and further adjustment, if necessary  Diabetic foot exam performed at the office visit today reveals good monofilament sensation   I have encouraged him to consider following up with Podiatry for regular diabetic foot care   Check hemoglobin A1c prior to next visit         2   Hypertension: Sandra Lui is normotensive in the office today   Continue lisinopril   Check comprehensive metabolic panel prior to next visit      3   Hyperlipidemia:   Continue atorvastatin  Discussed the relationship between hyperglycemia and high triglyceride levels  Check fasting lipid panel  CC:  Type 2 Diabetes follow-up    History of Present Illness     HPI:  62y o  year old male with type 2 diabetes for 19 years   He is on insulin at home and takes Levemir 35 units twice daily, Novolog 20 units at breakfast and 14 at lunch and dinner with sliding scale  Her most recent hemoglobin A1c from February 7, 2019 is 7 9  He denies any recent episodes of hypoglycemia, polyuria, polydipsia, nocturia and blurry vision   He denies neuropathy but does admit to neuropathy and retinopathy     He obtained his annual diabetic eye exam in November 2018  He has mild retinopathy  He complains of some discomfort to his feet intermittently but does not follow podiatry for regular diabetic foot care        Blood Sugar/Glucometer/Pump/CGM review:  Download of his personal roel Flores reveals higher blood sugars throughout the day with an average glucose of 158      For his hypertension, he is treated with lisinopril 10 mg daily      His hyperlipidemia is treated with atorvastatin 40 mg daily  Review of Systems   Constitutional: Negative  Negative for chills, fatigue and fever  HENT: Negative  Negative for trouble swallowing and voice change  Eyes: Negative for photophobia, pain, discharge, redness, itching and visual disturbance  Respiratory: Negative for cough and shortness of breath  Cardiovascular: Negative for chest pain and palpitations  Gastrointestinal: Negative for abdominal pain, constipation, diarrhea, nausea and vomiting  Endocrine: Negative for cold intolerance, heat intolerance, polydipsia, polyphagia and polyuria  Genitourinary: Negative  Musculoskeletal: Negative  Skin: Negative  Allergic/Immunologic: Negative  Neurological: Negative for dizziness, syncope, light-headedness and headaches  Hematological: Negative  Psychiatric/Behavioral: Negative  All other systems reviewed and are negative  Historical Information   No past medical history on file  No past surgical history on file    Social History   Social History     Substance and Sexual Activity   Alcohol Use Yes     Social History     Substance and Sexual Activity   Drug Use No     Social History     Tobacco Use   Smoking Status Former Smoker    Types: Cigarettes    Last attempt to quit: 4/2/2008    Years since quitting: 10 8   Smokeless Tobacco Never Used     Family History:   Family History   Adopted: Yes   Family history unknown: Yes       Meds/Allergies   Current Outpatient Medications   Medication Sig Dispense Refill    ascorbic acid (VITAMIN C) 500 mg tablet Take 500 mg by mouth daily      aspirin (ECOTRIN LOW STRENGTH) 81 mg EC tablet Take 81 mg by mouth daily      atorvastatin (LIPITOR) 40 mg tablet Take 1 tablet (40 mg total) by mouth daily 30 tablet 3    Continuous Blood Gluc  (FREESTYLE ALEXIS READER) DAHLIA 1 each by Does not apply route as needed (as directed) 1 Device 0    Continuous Blood Gluc Sensor ([address] [place] [address]) MISC 1 each by Does not apply route as needed (every 10 days) 3 each 3    insulin aspart (NOVOLOG FLEXPEN) 100 Units/mL SOPN 10 units 5 times daily 10 pen 5    insulin detemir (LEVEMIR FLEXTOUCH) subcutaneous injection pen 100 units/mL Inject 24 Units under the skin 2 (two) times a day 10 pen 5    Insulin Pen Needle (B-D UF III MINI PEN NEEDLES) 31G X 5 MM MISC by Does not apply route      lisinopril (ZESTRIL) 10 mg tablet Take by mouth      imipramine (TOFRANIL) 50 mg tablet TAKE 1 TABLET (50 MG TOTAL) BY MOUTH 2 (TWO) TIMES A DAY (Patient not taking: Reported on 11/8/2018 ) 60 tablet 4    vardenafil (LEVITRA) 20 MG tablet Take 1 tablet (20 mg total) by mouth daily as needed for erectile dysfunction (Patient not taking: Reported on 11/8/2018 ) 6 tablet 3     No current facility-administered medications for this visit  No Known Allergies    Objective   Vitals: Blood pressure 120/80, pulse 95, height 5' 10" (1 778 m), weight 83 9 kg (185 lb)  Physical Exam   Constitutional: He is oriented to person, place, and time  He appears well-developed and well-nourished  HENT:   Head: Normocephalic and atraumatic  Nose: Nose normal    Mouth/Throat: Oropharynx is clear and moist    Eyes: Pupils are equal, round, and reactive to light  Conjunctivae and EOM are normal    Wears glasses   Neck: Normal range of motion  Neck supple  Cardiovascular: Normal rate, regular rhythm, normal heart sounds and intact distal pulses   Pulses are no weak pulses  Pulses:       Dorsalis pedis pulses are 1+ on the right side, and 1+ on the left side  Posterior tibial pulses are 1+ on the right side, and 1+ on the left side  Pulmonary/Chest: Effort normal and breath sounds normal    Abdominal: Soft  Bowel sounds are normal    Musculoskeletal: Normal range of motion  Feet:   Right Foot:   Skin Integrity: Positive for dry skin  Negative for ulcer, skin breakdown, erythema, warmth or callus  Left Foot:   Skin Integrity: Positive for dry skin  Negative for ulcer, skin breakdown, erythema, warmth or callus  Neurological: He is alert and oriented to person, place, and time  Skin: Skin is warm and dry  Dry skin   Psychiatric: He has a normal mood and affect  His behavior is normal  Judgment and thought content normal    Vitals reviewed  Patient's shoes and socks removed  Right Foot/Ankle   Right Foot Inspection  Skin Exam: skin normal, skin intact and dry skin no warmth, no callus, no erythema, no maceration, no abnormal color, no pre-ulcer, no ulcer and no callus                          Toe Exam: ROM and strength within normal limits  Sensory       Monofilament testing: intact  Vascular  Capillary refills: < 3 seconds  The right DP pulse is 1+  The right PT pulse is 1+  Left Foot/Ankle  Left Foot Inspection  Skin Exam: skin normal, skin intact and dry skinno warmth, no erythema, no maceration, normal color, no pre-ulcer, no ulcer and no callus                         Toe Exam: ROM and strength within normal limits                   Sensory       Monofilament: intact  Vascular  Capillary refills: < 3 seconds  The left DP pulse is 1+  The left PT pulse is 1+  Assign Risk Category:  No deformity present; No loss of protective sensation;  No weak pulses       Risk: 0        Lab Results:   Lab Results   Component Value Date/Time    Hemoglobin A1C 7 9 (H) 02/07/2019 08:46 AM    Hemoglobin A1C 8 6 (H) 11/02/2018 08:08 AM    Hemoglobin A1C 8 4 (H) 07/24/2018 08:58 AM    BUN 18 02/07/2019 08:46 AM    BUN 17 11/02/2018 08:08 AM    BUN 13 07/24/2018 08:58 AM    Potassium 4 6 02/07/2019 08:46 AM    Potassium 4 6 11/02/2018 08:08 AM    Potassium 4 5 07/24/2018 08:58 AM    Chloride 100 02/07/2019 08:46 AM    Chloride 102 11/02/2018 08:08 AM    Chloride 97 07/24/2018 08:58 AM    CO2 25 02/07/2019 08:46 AM    CO2 23 11/02/2018 08:08 AM    CO2 25 07/24/2018 08:58 AM    AST 23 02/07/2019 08:46 AM    AST 32 11/02/2018 08:08 AM    AST 20 07/24/2018 08:58 AM    ALT 33 02/07/2019 08:46 AM    ALT 42 11/02/2018 08:08 AM    ALT 30 07/24/2018 08:58 AM    Albumin 4 4 02/07/2019 08:46 AM    Albumin 4 1 11/02/2018 08:08 AM    Albumin 4 3 07/24/2018 08:58 AM    Globulin, Total 2 3 02/07/2019 08:46 AM    Globulin, Total 2 6 11/02/2018 08:08 AM    Globulin, Total 2 4 07/24/2018 08:58 AM    HDL 36 (L) 07/24/2018 08:58 AM    HDL 44 03/28/2018 08:55 AM    Triglycerides 226 (H) 07/24/2018 08:58 AM    Triglycerides 199 (H) 03/28/2018 08:55 AM     Portions of the record may have been created with voice recognition software  Occasional wrong word or "sound a like" substitutions may have occurred due to the inherent limitations of voice recognition software  Read the chart carefully and recognize, using context, where substitutions have occurred

## 2019-03-02 DIAGNOSIS — F41.0 PANIC DISORDER: ICD-10-CM

## 2019-03-05 RX ORDER — IMIPRAMINE HCL 50 MG
50 TABLET ORAL DAILY
Qty: 30 TABLET | Refills: 0 | Status: SHIPPED | OUTPATIENT
Start: 2019-03-05 | End: 2019-04-03 | Stop reason: SDUPTHER

## 2019-03-05 NOTE — TELEPHONE ENCOUNTER
Approve        msg left pt cb verify ins & lab he uses due MM/labs 4/2019--30 day supply is being sent

## 2019-04-03 DIAGNOSIS — F41.0 PANIC DISORDER: ICD-10-CM

## 2019-04-03 RX ORDER — IMIPRAMINE HCL 50 MG
TABLET ORAL
Qty: 30 TABLET | Refills: 0 | Status: SHIPPED | OUTPATIENT
Start: 2019-04-03 | End: 2019-05-05 | Stop reason: SDUPTHER

## 2019-05-05 DIAGNOSIS — F41.0 PANIC DISORDER: ICD-10-CM

## 2019-05-06 RX ORDER — IMIPRAMINE HCL 50 MG
TABLET ORAL
Qty: 15 TABLET | Refills: 0 | Status: SHIPPED | OUTPATIENT
Start: 2019-05-06 | End: 2019-06-20 | Stop reason: SDUPTHER

## 2019-05-14 ENCOUNTER — TELEPHONE (OUTPATIENT)
Dept: FAMILY MEDICINE CLINIC | Facility: CLINIC | Age: 54
End: 2019-05-14

## 2019-05-14 LAB
ALBUMIN SERPL-MCNC: 4.5 G/DL (ref 3.5–5.5)
ALBUMIN/GLOB SERPL: 1.8 {RATIO} (ref 1.2–2.2)
ALP SERPL-CCNC: 65 IU/L (ref 39–117)
ALT SERPL-CCNC: 31 IU/L (ref 0–44)
AST SERPL-CCNC: 23 IU/L (ref 0–40)
BILIRUB SERPL-MCNC: 0.3 MG/DL (ref 0–1.2)
BUN SERPL-MCNC: 17 MG/DL (ref 6–24)
BUN/CREAT SERPL: 14 (ref 9–20)
CALCIUM SERPL-MCNC: 9.9 MG/DL (ref 8.7–10.2)
CHLORIDE SERPL-SCNC: 100 MMOL/L (ref 96–106)
CHOLEST SERPL-MCNC: 172 MG/DL (ref 100–199)
CHOLEST/HDLC SERPL: 4.6 RATIO (ref 0–5)
CO2 SERPL-SCNC: 25 MMOL/L (ref 20–29)
CREAT SERPL-MCNC: 1.19 MG/DL (ref 0.76–1.27)
EST. AVERAGE GLUCOSE BLD GHB EST-MCNC: 166 MG/DL
GLOBULIN SER-MCNC: 2.5 G/DL (ref 1.5–4.5)
GLUCOSE SERPL-MCNC: 204 MG/DL (ref 65–99)
HBA1C MFR BLD: 7.4 % (ref 4.8–5.6)
HDLC SERPL-MCNC: 37 MG/DL
LDLC SERPL CALC-MCNC: 90 MG/DL (ref 0–99)
POTASSIUM SERPL-SCNC: 5 MMOL/L (ref 3.5–5.2)
PROT SERPL-MCNC: 7 G/DL (ref 6–8.5)
SL AMB EGFR AFRICAN AMERICAN: 78 ML/MIN/1.73
SL AMB EGFR NON AFRICAN AMERICAN: 68 ML/MIN/1.73
SL AMB VLDL CHOLESTEROL CALC: 45 MG/DL (ref 5–40)
SODIUM SERPL-SCNC: 139 MMOL/L (ref 134–144)
TRIGL SERPL-MCNC: 225 MG/DL (ref 0–149)

## 2019-05-21 ENCOUNTER — OFFICE VISIT (OUTPATIENT)
Dept: ENDOCRINOLOGY | Facility: HOSPITAL | Age: 54
End: 2019-05-21
Payer: COMMERCIAL

## 2019-05-21 VITALS
BODY MASS INDEX: 26.66 KG/M2 | DIASTOLIC BLOOD PRESSURE: 80 MMHG | WEIGHT: 186.2 LBS | HEART RATE: 91 BPM | HEIGHT: 70 IN | SYSTOLIC BLOOD PRESSURE: 144 MMHG

## 2019-05-21 DIAGNOSIS — Z79.4 TYPE 2 DIABETES MELLITUS WITH RETINOPATHY, WITH LONG-TERM CURRENT USE OF INSULIN, MACULAR EDEMA PRESENCE UNSPECIFIED, UNSPECIFIED LATERALITY, UNSPECIFIED RETINOPATHY SEVERITY (HCC): Primary | ICD-10-CM

## 2019-05-21 DIAGNOSIS — E11.319 DIABETIC RETINOPATHY ASSOCIATED WITH TYPE 2 DIABETES MELLITUS, MACULAR EDEMA PRESENCE UNSPECIFIED, UNSPECIFIED LATERALITY, UNSPECIFIED RETINOPATHY SEVERITY (HCC): ICD-10-CM

## 2019-05-21 DIAGNOSIS — E11.69 HYPERLIPIDEMIA ASSOCIATED WITH TYPE 2 DIABETES MELLITUS (HCC): ICD-10-CM

## 2019-05-21 DIAGNOSIS — E78.5 HYPERLIPIDEMIA ASSOCIATED WITH TYPE 2 DIABETES MELLITUS (HCC): ICD-10-CM

## 2019-05-21 DIAGNOSIS — E11.319 TYPE 2 DIABETES MELLITUS WITH RETINOPATHY, WITH LONG-TERM CURRENT USE OF INSULIN, MACULAR EDEMA PRESENCE UNSPECIFIED, UNSPECIFIED LATERALITY, UNSPECIFIED RETINOPATHY SEVERITY (HCC): Primary | ICD-10-CM

## 2019-05-21 DIAGNOSIS — I10 ESSENTIAL HYPERTENSION: ICD-10-CM

## 2019-05-21 PROCEDURE — 99214 OFFICE O/P EST MOD 30 MIN: CPT | Performed by: NURSE PRACTITIONER

## 2019-05-21 RX ORDER — FLASH GLUCOSE SENSOR
1 KIT MISCELLANEOUS
Qty: 2 EACH | Refills: 6 | Status: SHIPPED | OUTPATIENT
Start: 2019-05-21 | End: 2019-12-19 | Stop reason: SDUPTHER

## 2019-05-21 RX ORDER — FLASH GLUCOSE SCANNING READER
1 EACH MISCELLANEOUS
Qty: 1 DEVICE | Refills: 0 | Status: SHIPPED | OUTPATIENT
Start: 2019-05-21 | End: 2020-01-09 | Stop reason: SDUPTHER

## 2019-06-11 ENCOUNTER — TELEPHONE (OUTPATIENT)
Dept: FAMILY MEDICINE CLINIC | Facility: CLINIC | Age: 54
End: 2019-06-11

## 2019-06-17 ENCOUNTER — TELEPHONE (OUTPATIENT)
Dept: ENDOCRINOLOGY | Facility: CLINIC | Age: 54
End: 2019-06-17

## 2019-06-17 DIAGNOSIS — E11.319 TYPE 2 DIABETES MELLITUS WITH RETINOPATHY, WITH LONG-TERM CURRENT USE OF INSULIN, MACULAR EDEMA PRESENCE UNSPECIFIED, UNSPECIFIED LATERALITY, UNSPECIFIED RETINOPATHY SEVERITY (HCC): ICD-10-CM

## 2019-06-17 DIAGNOSIS — Z79.4 TYPE 2 DIABETES MELLITUS WITH RETINOPATHY, WITH LONG-TERM CURRENT USE OF INSULIN, MACULAR EDEMA PRESENCE UNSPECIFIED, UNSPECIFIED LATERALITY, UNSPECIFIED RETINOPATHY SEVERITY (HCC): ICD-10-CM

## 2019-06-20 ENCOUNTER — OFFICE VISIT (OUTPATIENT)
Dept: FAMILY MEDICINE CLINIC | Facility: CLINIC | Age: 54
End: 2019-06-20
Payer: COMMERCIAL

## 2019-06-20 VITALS
BODY MASS INDEX: 27.25 KG/M2 | OXYGEN SATURATION: 98 % | HEIGHT: 69 IN | RESPIRATION RATE: 18 BRPM | TEMPERATURE: 97.8 F | DIASTOLIC BLOOD PRESSURE: 90 MMHG | HEART RATE: 96 BPM | WEIGHT: 184 LBS | SYSTOLIC BLOOD PRESSURE: 136 MMHG

## 2019-06-20 DIAGNOSIS — I10 ESSENTIAL HYPERTENSION: ICD-10-CM

## 2019-06-20 DIAGNOSIS — Z00.00 ENCOUNTER FOR WELLNESS EXAMINATION IN ADULT: Primary | ICD-10-CM

## 2019-06-20 DIAGNOSIS — Z11.59 ENCOUNTER FOR HEPATITIS C SCREENING TEST FOR LOW RISK PATIENT: ICD-10-CM

## 2019-06-20 DIAGNOSIS — E11.3293 MILD NONPROLIFERATIVE DIABETIC RETINOPATHY OF BOTH EYES WITHOUT MACULAR EDEMA ASSOCIATED WITH TYPE 2 DIABETES MELLITUS (HCC): ICD-10-CM

## 2019-06-20 DIAGNOSIS — E11.69 HYPERLIPIDEMIA ASSOCIATED WITH TYPE 2 DIABETES MELLITUS (HCC): ICD-10-CM

## 2019-06-20 DIAGNOSIS — F41.0 PANIC DISORDER: ICD-10-CM

## 2019-06-20 DIAGNOSIS — Z79.4 TYPE 2 DIABETES MELLITUS WITH RETINOPATHY, WITH LONG-TERM CURRENT USE OF INSULIN, MACULAR EDEMA PRESENCE UNSPECIFIED, UNSPECIFIED LATERALITY, UNSPECIFIED RETINOPATHY SEVERITY (HCC): ICD-10-CM

## 2019-06-20 DIAGNOSIS — E78.5 HYPERLIPIDEMIA ASSOCIATED WITH TYPE 2 DIABETES MELLITUS (HCC): ICD-10-CM

## 2019-06-20 DIAGNOSIS — E11.319 TYPE 2 DIABETES MELLITUS WITH RETINOPATHY, WITH LONG-TERM CURRENT USE OF INSULIN, MACULAR EDEMA PRESENCE UNSPECIFIED, UNSPECIFIED LATERALITY, UNSPECIFIED RETINOPATHY SEVERITY (HCC): ICD-10-CM

## 2019-06-20 PROCEDURE — 3008F BODY MASS INDEX DOCD: CPT | Performed by: FAMILY MEDICINE

## 2019-06-20 PROCEDURE — 99214 OFFICE O/P EST MOD 30 MIN: CPT | Performed by: FAMILY MEDICINE

## 2019-06-20 PROCEDURE — 1036F TOBACCO NON-USER: CPT | Performed by: FAMILY MEDICINE

## 2019-06-20 PROCEDURE — 99396 PREV VISIT EST AGE 40-64: CPT | Performed by: FAMILY MEDICINE

## 2019-06-20 RX ORDER — IMIPRAMINE HCL 50 MG
50 TABLET ORAL DAILY
Qty: 30 TABLET | Refills: 5 | Status: SHIPPED | OUTPATIENT
Start: 2019-06-20 | End: 2019-12-16 | Stop reason: SDUPTHER

## 2019-06-28 DIAGNOSIS — E11.319 TYPE 2 DIABETES MELLITUS WITH RETINOPATHY, WITH LONG-TERM CURRENT USE OF INSULIN, MACULAR EDEMA PRESENCE UNSPECIFIED, UNSPECIFIED LATERALITY, UNSPECIFIED RETINOPATHY SEVERITY (HCC): Primary | ICD-10-CM

## 2019-06-28 DIAGNOSIS — Z79.4 TYPE 2 DIABETES MELLITUS WITH RETINOPATHY, WITH LONG-TERM CURRENT USE OF INSULIN, MACULAR EDEMA PRESENCE UNSPECIFIED, UNSPECIFIED LATERALITY, UNSPECIFIED RETINOPATHY SEVERITY (HCC): Primary | ICD-10-CM

## 2019-08-21 ENCOUNTER — TELEPHONE (OUTPATIENT)
Dept: FAMILY MEDICINE CLINIC | Facility: CLINIC | Age: 54
End: 2019-08-21

## 2019-08-21 LAB
ALBUMIN SERPL-MCNC: 4.4 G/DL (ref 3.5–5.5)
ALBUMIN/CREAT UR: <4.9 MG/G CREAT (ref 0–30)
ALBUMIN/GLOB SERPL: 1.7 {RATIO} (ref 1.2–2.2)
ALP SERPL-CCNC: 72 IU/L (ref 39–117)
ALT SERPL-CCNC: 33 IU/L (ref 0–44)
AST SERPL-CCNC: 27 IU/L (ref 0–40)
BILIRUB SERPL-MCNC: 0.4 MG/DL (ref 0–1.2)
BUN SERPL-MCNC: 20 MG/DL (ref 6–24)
BUN/CREAT SERPL: 17 (ref 9–20)
CALCIUM SERPL-MCNC: 9.5 MG/DL (ref 8.7–10.2)
CHLORIDE SERPL-SCNC: 100 MMOL/L (ref 96–106)
CO2 SERPL-SCNC: 24 MMOL/L (ref 20–29)
CREAT SERPL-MCNC: 1.17 MG/DL (ref 0.76–1.27)
CREAT UR-MCNC: 61.5 MG/DL
EST. AVERAGE GLUCOSE BLD GHB EST-MCNC: 171 MG/DL
GLOBULIN SER-MCNC: 2.6 G/DL (ref 1.5–4.5)
GLUCOSE SERPL-MCNC: 172 MG/DL (ref 65–99)
HBA1C MFR BLD: 7.6 % (ref 4.8–5.6)
HCV AB S/CO SERPL IA: <0.1 S/CO RATIO (ref 0–0.9)
MICROALBUMIN UR-MCNC: <3 UG/ML
POTASSIUM SERPL-SCNC: 4.4 MMOL/L (ref 3.5–5.2)
PROT SERPL-MCNC: 7 G/DL (ref 6–8.5)
SL AMB EGFR AFRICAN AMERICAN: 80 ML/MIN/1.73
SL AMB EGFR NON AFRICAN AMERICAN: 69 ML/MIN/1.73
SODIUM SERPL-SCNC: 137 MMOL/L (ref 134–144)

## 2019-08-21 NOTE — TELEPHONE ENCOUNTER
----- Message from Kristopher Schirmer, MD sent at 8/21/2019  9:47 AM EDT -----  Call patient with lab result-hep C negative

## 2019-08-27 ENCOUNTER — OFFICE VISIT (OUTPATIENT)
Dept: ENDOCRINOLOGY | Facility: HOSPITAL | Age: 54
End: 2019-08-27
Payer: COMMERCIAL

## 2019-08-27 ENCOUNTER — TELEPHONE (OUTPATIENT)
Dept: FAMILY MEDICINE CLINIC | Facility: CLINIC | Age: 54
End: 2019-08-27

## 2019-08-27 VITALS
WEIGHT: 185.2 LBS | SYSTOLIC BLOOD PRESSURE: 130 MMHG | HEIGHT: 69 IN | BODY MASS INDEX: 27.43 KG/M2 | DIASTOLIC BLOOD PRESSURE: 80 MMHG | HEART RATE: 85 BPM

## 2019-08-27 DIAGNOSIS — E78.5 HYPERLIPIDEMIA ASSOCIATED WITH TYPE 2 DIABETES MELLITUS (HCC): ICD-10-CM

## 2019-08-27 DIAGNOSIS — I10 ESSENTIAL HYPERTENSION: ICD-10-CM

## 2019-08-27 DIAGNOSIS — E11.3293 MILD NONPROLIFERATIVE DIABETIC RETINOPATHY OF BOTH EYES WITHOUT MACULAR EDEMA ASSOCIATED WITH TYPE 2 DIABETES MELLITUS (HCC): ICD-10-CM

## 2019-08-27 DIAGNOSIS — E11.69 HYPERLIPIDEMIA ASSOCIATED WITH TYPE 2 DIABETES MELLITUS (HCC): ICD-10-CM

## 2019-08-27 DIAGNOSIS — E78.5 HYPERLIPIDEMIA, UNSPECIFIED HYPERLIPIDEMIA TYPE: ICD-10-CM

## 2019-08-27 DIAGNOSIS — Z79.4 TYPE 2 DIABETES MELLITUS WITH RETINOPATHY, WITH LONG-TERM CURRENT USE OF INSULIN, MACULAR EDEMA PRESENCE UNSPECIFIED, UNSPECIFIED LATERALITY, UNSPECIFIED RETINOPATHY SEVERITY (HCC): Primary | ICD-10-CM

## 2019-08-27 DIAGNOSIS — E11.319 TYPE 2 DIABETES MELLITUS WITH RETINOPATHY, WITH LONG-TERM CURRENT USE OF INSULIN, MACULAR EDEMA PRESENCE UNSPECIFIED, UNSPECIFIED LATERALITY, UNSPECIFIED RETINOPATHY SEVERITY (HCC): Primary | ICD-10-CM

## 2019-08-27 PROCEDURE — 95251 CONT GLUC MNTR ANALYSIS I&R: CPT | Performed by: NURSE PRACTITIONER

## 2019-08-27 PROCEDURE — 99214 OFFICE O/P EST MOD 30 MIN: CPT | Performed by: NURSE PRACTITIONER

## 2019-08-27 PROCEDURE — 3075F SYST BP GE 130 - 139MM HG: CPT | Performed by: NURSE PRACTITIONER

## 2019-08-27 RX ORDER — ATORVASTATIN CALCIUM 40 MG/1
TABLET, FILM COATED ORAL
Qty: 30 TABLET | Refills: 3 | Status: SHIPPED | OUTPATIENT
Start: 2019-08-27 | End: 2020-01-09 | Stop reason: SDUPTHER

## 2019-08-27 NOTE — PROGRESS NOTES
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:  His most recent hemoglobin A1c is slightly more elevated at 7 6  However, review of his Freestyle Adry download report reveals that he is relatively well controlled throughout the day with no hypoglycemia  For now, I have asked him to increase his Levemir dose to 45 units in the morning and  continue 35 units in the evening   His NovoLog dose at breakfast will remain the same at 20 units and 16 units at lunchtime and dinnertime with his sliding scale   He will continue to utilize the freestyle adry and for the report to the office in 2 weeks for review and further adjustment, if necessary  Diabetic foot exam performed in the office today reveals good monofilament sensation  He will continue to follow up with Ophthalmology  Next visit is scheduled for November 2019   Check hemoglobin A1c prior to next visit         2   Hypertension: Sophie Peng is normotensive in the office today   Continue lisinopril   Check comprehensive metabolic panel prior to next visit      3   Hyperlipidemia:   Continue atorvastatin   Check fasting lipid panel prior to next visit  CC:  Type 2 Diabetes follow-up    History of Present Illness     HPI:  57 y  o  year old male with type 2 diabetes for 19 years   He is on insulin at home and takes Levemir 35 units in the evening and 40 units in the morning, Novolog 20 units at breakfast and 16 at lunch and dinner with sliding scale   Her most recent hemoglobin A1c from August 20, 2019 is 7 6   He denies any recent episodes of hypoglycemia, polyuria, polydipsia, nocturia and blurry vision   He denies neuropathy but does admit to neuropathy and retinopathy     He obtained his annual diabetic eye exam in 2018   He has mild retinopathy   He complains of some discomfort to his feet intermittently but does not follow podiatry for regular diabetic foot care        Blood Sugar/Glucometer/Pump/CGM review: Emily Schilling of his personal freestyle jeremy from  through 2019 reveals relatively well controlled blood sugars throughout the day  His average glucose was 139 with a standard deviation of 38  5      For his hypertension, he is treated with lisinopril 10 mg daily  He states his compliance with his lisinopril has improved      His hyperlipidemia is treated with atorvastatin 40 mg daily       Review of Systems   Constitutional: Negative  Negative for fatigue  HENT: Negative  Respiratory: Negative for cough and shortness of breath  Cardiovascular: Negative for chest pain and palpitations  Gastrointestinal: Negative for abdominal pain, constipation, diarrhea, nausea and vomiting  Genitourinary: Negative  Musculoskeletal: Negative  Skin: Negative  Allergic/Immunologic: Negative  Neurological: Negative for syncope, light-headedness and headaches  Hematological: Negative  Psychiatric/Behavioral: Negative  All other systems reviewed and are negative  Historical Information   No past medical history on file    Past Surgical History:   Procedure Laterality Date    NO PAST SURGERIES       Social History   Social History     Substance and Sexual Activity   Alcohol Use Not Currently     Social History     Substance and Sexual Activity   Drug Use No     Social History     Tobacco Use   Smoking Status Former Smoker    Packs/day: 1 00    Years: 20 00    Pack years: 20 00    Types: Cigarettes    Last attempt to quit: 2008    Years since quittin 4   Smokeless Tobacco Never Used     Family History:   Family History   Adopted: Yes   Family history unknown: Yes       Meds/Allergies   Current Outpatient Medications Medication Sig Dispense Refill    ascorbic acid (VITAMIN C) 500 mg tablet Take 500 mg by mouth daily      aspirin (ECOTRIN LOW STRENGTH) 81 mg EC tablet Take 81 mg by mouth daily      atorvastatin (LIPITOR) 40 mg tablet Take 1 tablet (40 mg total) by mouth daily 30 tablet 6    Continuous Blood Gluc  (FREESTYLE ALEXIS 14 DAY READER) DAHLIA 1 Device by Does not apply route 4 (four) times a day (before meals and at bedtime) 1 Device 0    Continuous Blood Gluc Sensor (FREESTYLE ALEXIS 14 DAY SENSOR) MISC 1 application by Does not apply route every 14 (fourteen) days 2 each 6    imipramine (TOFRANIL) 50 mg tablet Take 1 tablet (50 mg total) by mouth daily 30 tablet 5    insulin aspart (NOVOLOG FLEXPEN) 100 Units/mL injection pen Inject 25 units at breakfast and 20 units at lunch and  20 units at dinner  20 pen 1    insulin detemir (LEVEMIR FLEXTOUCH) 100 Units/mL injection pen Inject 40 units in the morning and 35 units in the evening 10 pen 6    Insulin Pen Needle (B-D UF III MINI PEN NEEDLES) 31G X 5 MM MISC Use 2 needles daily 200 each 3    lisinopril (ZESTRIL) 10 mg tablet Take 1 tablet (10 mg total) by mouth daily 30 tablet 6     No current facility-administered medications for this visit  No Known Allergies    Objective     Physical Exam   Constitutional: He is oriented to person, place, and time  He appears well-developed and well-nourished  HENT:   Head: Normocephalic and atraumatic  Nose: Nose normal    Mouth/Throat: Oropharynx is clear and moist    Eyes: Pupils are equal, round, and reactive to light  Conjunctivae and EOM are normal    Wears glasses   Neck: Normal range of motion  Neck supple  Cardiovascular: Normal rate, regular rhythm, normal heart sounds and intact distal pulses  Pulses are no weak pulses  Pulses:       Dorsalis pedis pulses are 1+ on the right side, and 1+ on the left side  Posterior tibial pulses are 1+ on the right side, and 1+ on the left side  Pulmonary/Chest: Effort normal and breath sounds normal    Abdominal: Soft  Bowel sounds are normal    Musculoskeletal: Normal range of motion  Feet:   Right Foot:   Skin Integrity: Negative for ulcer, skin breakdown, erythema, warmth, callus or dry skin  Left Foot:   Skin Integrity: Negative for ulcer, skin breakdown, erythema, warmth, callus or dry skin  Neurological: He is alert and oriented to person, place, and time  Skin: Skin is warm and dry  Dry skin   Psychiatric: He has a normal mood and affect  His behavior is normal  Judgment and thought content normal    Vitals reviewed  Patient's shoes and socks removed  Right Foot/Ankle   Right Foot Inspection  Skin Exam: skin normal and skin intact no dry skin, no warmth, no callus, no erythema, no maceration, no abnormal color, no pre-ulcer, no ulcer and no callus                          Toe Exam: ROM and strength within normal limits  Sensory       Monofilament testing: intact  Vascular  Capillary refills: < 3 seconds  The right DP pulse is 1+  The right PT pulse is 1+  Left Foot/Ankle  Left Foot Inspection  Skin Exam: skin normal and skin intactno dry skin, no warmth, no erythema, no maceration, normal color, no pre-ulcer, no ulcer and no callus                         Toe Exam: ROM and strength within normal limits                   Sensory       Monofilament: intact  Vascular  Capillary refills: < 3 seconds  The left DP pulse is 1+  The left PT pulse is 1+  Assign Risk Category:  No deformity present; No loss of protective sensation;  No weak pulses       Risk: 0    Lab Results:   Lab Results   Component Value Date/Time    Hemoglobin A1C 7 6 (H) 08/20/2019 08:34 AM    Hemoglobin A1C 7 4 (H) 05/13/2019 09:17 AM    Hemoglobin A1C 7 9 (H) 02/07/2019 08:46 AM    BUN 20 08/20/2019 08:34 AM    BUN 17 05/13/2019 09:17 AM    BUN 18 02/07/2019 08:46 AM    Potassium 4 4 08/20/2019 08:34 AM    Potassium 5 0 05/13/2019 09:17 AM    Potassium 4 6 02/07/2019 08:46 AM    Chloride 100 08/20/2019 08:34 AM    Chloride 100 05/13/2019 09:17 AM    Chloride 100 02/07/2019 08:46 AM    CO2 24 08/20/2019 08:34 AM    CO2 25 05/13/2019 09:17 AM    CO2 25 02/07/2019 08:46 AM    Creatinine 1 17 08/20/2019 08:34 AM    Creatinine 1 19 05/13/2019 09:17 AM    Creatinine 1 12 02/07/2019 08:46 AM    AST 27 08/20/2019 08:34 AM    AST 23 05/13/2019 09:17 AM    AST 23 02/07/2019 08:46 AM    ALT 33 08/20/2019 08:34 AM    ALT 31 05/13/2019 09:17 AM    ALT 33 02/07/2019 08:46 AM    Albumin 4 4 08/20/2019 08:34 AM    Albumin 4 5 05/13/2019 09:17 AM    Albumin 4 4 02/07/2019 08:46 AM    Globulin, Total 2 6 08/20/2019 08:34 AM    Globulin, Total 2 5 05/13/2019 09:17 AM    Globulin, Total 2 3 02/07/2019 08:46 AM    HDL 37 (L) 05/13/2019 09:17 AM    Triglycerides 225 (H) 05/13/2019 09:17 AM     Portions of the record may have been created with voice recognition software  Occasional wrong word or "sound a like" substitutions may have occurred due to the inherent limitations of voice recognition software  Read the chart carefully and recognize, using context, where substitutions have occurred

## 2019-08-27 NOTE — PATIENT INSTRUCTIONS
Be mindful of diet       Exercise regularly and stay hydrated       Continue Levemir 35 units in the evening      Increase Levemir to 45 in the morning        Continue breakfast NovoLog to 20 units       Continue lunchtime and dinnertime NovoLog to 16 units       Continue sliding scale      Make sure you are injecting insulin consistently      Continue to utilize the freestyle jeremy      Please come by the office in approximately 2-3 weeks for download of your freestyle jeremy      Contact the office with any consistent hypoglycemia      Continue lisinopril  - consistently       Continue atorvastatin      Obtain lab work prior to next visit      Continue to follow up with ophthalmology in November 2019

## 2019-08-27 NOTE — TELEPHONE ENCOUNTER
Dayanara from [facility] endocrinology called requesting a insurance referral for patient appointment today  NPI #-[id number]  ICD 10 - E11 319  APPT: 8/27/19  See referral information below         Referral #: [id number]  Effective: 08/27/2019  Expires: 11/24/2019

## 2019-10-31 DIAGNOSIS — E78.5 HYPERLIPIDEMIA, UNSPECIFIED HYPERLIPIDEMIA TYPE: Primary | ICD-10-CM

## 2019-10-31 RX ORDER — ATORVASTATIN CALCIUM 40 MG/1
TABLET, FILM COATED ORAL
Qty: 30 TABLET | Refills: 6 | Status: SHIPPED | OUTPATIENT
Start: 2019-10-31 | End: 2020-01-09 | Stop reason: SDUPTHER

## 2019-12-07 LAB
ALBUMIN SERPL-MCNC: 4.3 G/DL (ref 3.5–5.5)
ALBUMIN/GLOB SERPL: 1.6 {RATIO} (ref 1.2–2.2)
ALP SERPL-CCNC: 79 IU/L (ref 39–117)
ALT SERPL-CCNC: 46 IU/L (ref 0–44)
AST SERPL-CCNC: 29 IU/L (ref 0–40)
BILIRUB SERPL-MCNC: 0.3 MG/DL (ref 0–1.2)
BUN SERPL-MCNC: 17 MG/DL (ref 6–24)
BUN/CREAT SERPL: 13 (ref 9–20)
CALCIUM SERPL-MCNC: 9.6 MG/DL (ref 8.7–10.2)
CHLORIDE SERPL-SCNC: 99 MMOL/L (ref 96–106)
CHOLEST SERPL-MCNC: 184 MG/DL (ref 100–199)
CHOLEST/HDLC SERPL: 4.6 RATIO (ref 0–5)
CO2 SERPL-SCNC: 24 MMOL/L (ref 20–29)
CREAT SERPL-MCNC: 1.28 MG/DL (ref 0.76–1.27)
EST. AVERAGE GLUCOSE BLD GHB EST-MCNC: 177 MG/DL
GLOBULIN SER-MCNC: 2.7 G/DL (ref 1.5–4.5)
GLUCOSE SERPL-MCNC: 168 MG/DL (ref 65–99)
HBA1C MFR BLD: 7.8 % (ref 4.8–5.6)
HDLC SERPL-MCNC: 40 MG/DL
LDLC SERPL CALC-MCNC: 99 MG/DL (ref 0–99)
POTASSIUM SERPL-SCNC: 4.5 MMOL/L (ref 3.5–5.2)
PROT SERPL-MCNC: 7 G/DL (ref 6–8.5)
SL AMB EGFR AFRICAN AMERICAN: 72 ML/MIN/1.73
SL AMB EGFR NON AFRICAN AMERICAN: 62 ML/MIN/1.73
SL AMB VLDL CHOLESTEROL CALC: 45 MG/DL (ref 5–40)
SODIUM SERPL-SCNC: 137 MMOL/L (ref 134–144)
TRIGL SERPL-MCNC: 226 MG/DL (ref 0–149)
TSH SERPL DL<=0.005 MIU/L-ACNC: 2.12 UIU/ML (ref 0.45–4.5)

## 2019-12-14 LAB — HEMOCCULT STL QL IA: NEGATIVE

## 2019-12-16 ENCOUNTER — TELEPHONE (OUTPATIENT)
Dept: FAMILY MEDICINE CLINIC | Facility: CLINIC | Age: 54
End: 2019-12-16

## 2019-12-16 DIAGNOSIS — F41.0 PANIC DISORDER: ICD-10-CM

## 2019-12-16 RX ORDER — IMIPRAMINE HCL 50 MG
TABLET ORAL
Qty: 30 TABLET | Refills: 0 | Status: SHIPPED | OUTPATIENT
Start: 2019-12-16 | End: 2020-01-09 | Stop reason: SDUPTHER

## 2019-12-16 NOTE — TELEPHONE ENCOUNTER
Approve      Pt is still taking & will call back after he is back in town after holidays to schedule apt

## 2019-12-16 NOTE — TELEPHONE ENCOUNTER
----- Message from Deangelo Novak MD sent at 12/16/2019  2:07 PM EST -----  MM/LABS 12MOS---neg hemoccult---colo good for 1 yr

## 2019-12-17 ENCOUNTER — TELEPHONE (OUTPATIENT)
Dept: FAMILY MEDICINE CLINIC | Facility: CLINIC | Age: 54
End: 2019-12-17

## 2019-12-17 NOTE — TELEPHONE ENCOUNTER
Valid   Referral #: [id number]  Effective: 12/17/2019  Expires: 03/15/2020    Referral [id number] has been successfully submitted      Ladi PHILLIPS [place], [place] [id number]      PATIENT'S INSURANCE  Member ID: [id number]     PRIMARY CARE PHYSICIAN  [facility] [place]  NPI: [id number]  From

## 2019-12-17 NOTE — TELEPHONE ENCOUNTER
Referral needed for center [facility]  NPI- [id number]  ICD 10- E11 319  APPOINTMENT 12/27/19

## 2019-12-19 DIAGNOSIS — Z79.4 TYPE 2 DIABETES MELLITUS WITH RETINOPATHY, WITH LONG-TERM CURRENT USE OF INSULIN, MACULAR EDEMA PRESENCE UNSPECIFIED, UNSPECIFIED LATERALITY, UNSPECIFIED RETINOPATHY SEVERITY (HCC): ICD-10-CM

## 2019-12-19 DIAGNOSIS — E11.319 TYPE 2 DIABETES MELLITUS WITH RETINOPATHY, WITH LONG-TERM CURRENT USE OF INSULIN, MACULAR EDEMA PRESENCE UNSPECIFIED, UNSPECIFIED LATERALITY, UNSPECIFIED RETINOPATHY SEVERITY (HCC): ICD-10-CM

## 2019-12-20 RX ORDER — FLASH GLUCOSE SENSOR
KIT MISCELLANEOUS
Qty: 2 EACH | Refills: 0 | Status: SHIPPED | OUTPATIENT
Start: 2019-12-20 | End: 2019-12-27 | Stop reason: SDUPTHER

## 2019-12-27 ENCOUNTER — OFFICE VISIT (OUTPATIENT)
Dept: ENDOCRINOLOGY | Facility: HOSPITAL | Age: 54
End: 2019-12-27
Payer: COMMERCIAL

## 2019-12-27 VITALS
DIASTOLIC BLOOD PRESSURE: 74 MMHG | HEIGHT: 69 IN | SYSTOLIC BLOOD PRESSURE: 132 MMHG | WEIGHT: 188.2 LBS | BODY MASS INDEX: 27.88 KG/M2 | HEART RATE: 84 BPM

## 2019-12-27 DIAGNOSIS — Z79.4 TYPE 2 DIABETES MELLITUS WITH RETINOPATHY, WITH LONG-TERM CURRENT USE OF INSULIN, MACULAR EDEMA PRESENCE UNSPECIFIED, UNSPECIFIED LATERALITY, UNSPECIFIED RETINOPATHY SEVERITY (HCC): Primary | ICD-10-CM

## 2019-12-27 DIAGNOSIS — E11.319 TYPE 2 DIABETES MELLITUS WITH RETINOPATHY, WITH LONG-TERM CURRENT USE OF INSULIN, MACULAR EDEMA PRESENCE UNSPECIFIED, UNSPECIFIED LATERALITY, UNSPECIFIED RETINOPATHY SEVERITY (HCC): Primary | ICD-10-CM

## 2019-12-27 DIAGNOSIS — I10 ESSENTIAL HYPERTENSION: ICD-10-CM

## 2019-12-27 DIAGNOSIS — E11.69 HYPERLIPIDEMIA ASSOCIATED WITH TYPE 2 DIABETES MELLITUS (HCC): ICD-10-CM

## 2019-12-27 DIAGNOSIS — E11.3293 MILD NONPROLIFERATIVE DIABETIC RETINOPATHY OF BOTH EYES WITHOUT MACULAR EDEMA ASSOCIATED WITH TYPE 2 DIABETES MELLITUS (HCC): ICD-10-CM

## 2019-12-27 DIAGNOSIS — E78.5 HYPERLIPIDEMIA ASSOCIATED WITH TYPE 2 DIABETES MELLITUS (HCC): ICD-10-CM

## 2019-12-27 PROCEDURE — 95251 CONT GLUC MNTR ANALYSIS I&R: CPT | Performed by: NURSE PRACTITIONER

## 2019-12-27 PROCEDURE — 3078F DIAST BP <80 MM HG: CPT | Performed by: NURSE PRACTITIONER

## 2019-12-27 PROCEDURE — 3075F SYST BP GE 130 - 139MM HG: CPT | Performed by: NURSE PRACTITIONER

## 2019-12-27 PROCEDURE — 4010F ACE/ARB THERAPY RXD/TAKEN: CPT | Performed by: FAMILY MEDICINE

## 2019-12-27 PROCEDURE — 99214 OFFICE O/P EST MOD 30 MIN: CPT | Performed by: NURSE PRACTITIONER

## 2019-12-27 RX ORDER — FLASH GLUCOSE SENSOR
KIT MISCELLANEOUS
Qty: 2 EACH | Refills: 6 | Status: SHIPPED | OUTPATIENT
Start: 2019-12-27 | End: 2020-01-31 | Stop reason: SDUPTHER

## 2019-12-27 RX ORDER — LISINOPRIL 10 MG/1
10 TABLET ORAL DAILY
Qty: 30 TABLET | Refills: 6 | Status: SHIPPED | OUTPATIENT
Start: 2019-12-27 | End: 2020-08-18 | Stop reason: SDUPTHER

## 2019-12-27 NOTE — PATIENT INSTRUCTIONS
Be mindful of diet       Exercise regularly and stay hydrated       Increase Levemir 38 units in the evening      Continue Levemir to 45 in the morning        Increase breakfast NovoLog to 24 units       Increase lunchtime and dinnertime NovoLog to 18 units       Continue sliding scale      Make sure you are injecting insulin consistently      Continue to utilize the freestyle jeremy      Please come by the office in approximately 2-3 weeks for download of your freestyle jeremy      Contact the office with any consistent hypoglycemia      Continue lisinopril  - consistently       Continue atorvastatin      Obtain lab work prior to next visit      Obtain a diabetic eye exam   Please ask your ophthalmologist to send us a report to our office

## 2019-12-27 NOTE — PROGRESS NOTES
Timothy Nair 62 y o  male MRN: [id number]    Encounter: [id number]      Assessment/Plan     Assessment: This is a 62y o -year-old male with type 2 diabetes with retinopathy and long-term insulin use, hypertension and hyperlipidemia  Plan:  1   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:  His most recent hemoglobin A1c is slightly more elevated at 7 8  However, review of his Freestyle [place] download report reveals that he is relatively well controlled throughout the day with no hypoglycemia  For now, I have asked him to increase his Levemir dose to 45 units in the morning and increase 38 units in the evening   His NovoLog dose at breakfast will remain the same at 24 units and 18 units at lunchtime and dinnertime with his sliding scale  He will continue to utilize the freestyle [place] and for the report to the office in 2 weeks for review and further adjustment, if necessary  He will continue to follow up with Ophthalmology for repeat eye exam in January 2020  Check hemoglobin A1c prior to next visit         2   Hypertension: Lavella Medicine is normotensive in the office today   Continue lisinopril   Check comprehensive metabolic panel prior to next visit      3   Hyperlipidemia:  Triglycerides are elevated on most recent fasting lipid panel at 226  This is most likely due to his hyperglycemia    Continue atorvastatin       CC:  Type 2 Diabetes follow-up    History of Present Illness     HPI:  57 y  o  year old male with type 2 diabetes for 19 years   He is on insulin at home and takes Levemir 35 units in the evening and 45 units in the morning, Novolog 22 units at breakfast and 16 at lunch and dinner with sliding scale   Her most recent hemoglobin A1c from  December 6, 2019 is 7 8   He denies any recent episodes of hypoglycemia, polyuria, polydipsia, nocturia and blurry vision   He denies neuropathy but does admit to neuropathy and retinopathy       He obtained his annual diabetic eye exam in November 2018   He has mild retinopathy   He complains of some discomfort to his feet intermittently but does not follow podiatry for regular diabetic foot care  Diabetic foot exam was performed at last office visit on August 27, 2019        Blood Sugar/Glucometer/Pump/CGM review: Nola Monet of his personal freestyle jeremy from December 14 through December 27, 2019 reveals relatively well controlled blood sugars throughout the day  His average glucose was 156 with a standard deviation of 40 8      For his hypertension, he is treated with lisinopril 10 mg daily   He states his compliance with his lisinopril has improved      His hyperlipidemia is treated with atorvastatin 40 mg daily       Review of Systems   Constitutional: Negative  Negative for chills, fatigue and fever  HENT: Negative  Negative for trouble swallowing and voice change  Eyes: Negative for photophobia, pain, discharge, redness, itching and visual disturbance  Respiratory: Negative for cough and shortness of breath  Cardiovascular: Negative for chest pain and palpitations  Gastrointestinal: Negative for abdominal pain, constipation, diarrhea, nausea and vomiting  Endocrine: Negative for cold intolerance, heat intolerance, polydipsia, polyphagia and polyuria  Genitourinary: Negative  Musculoskeletal: Positive for back pain (chronic low back)  Skin: Negative  Allergic/Immunologic: Negative  Neurological: Negative for dizziness, syncope, light-headedness and headaches  Hematological: Negative  Psychiatric/Behavioral: Negative  All other systems reviewed and are negative  Historical Information   No past medical history on file    Past Surgical History:   Procedure Laterality Date    NO PAST SURGERIES       Social History   Social History     Substance and Sexual Activity   Alcohol Use Not Currently     Social History     Substance and Sexual Activity   Drug Use No     Social History     Tobacco Use   Smoking Status Former Smoker    Packs/day: 1 00    Years: 20 00    Pack years: 20 00    Types: Cigarettes    Last attempt to quit: 2008    Years since quittin 7   Smokeless Tobacco Never Used     Family History:   Family History   Adopted: Yes   Family history unknown: Yes       Meds/Allergies   Current Outpatient Medications   Medication Sig Dispense Refill    ascorbic acid (VITAMIN C) 500 mg tablet Take 500 mg by mouth daily      aspirin (ECOTRIN LOW STRENGTH) 81 mg EC tablet Take 81 mg by mouth daily      atorvastatin (LIPITOR) 40 mg tablet Take 1 tablet (40 mg total) by mouth daily 30 tablet 6    Continuous Blood Gluc  (FREESTYLE ALEXIS 14 DAY READER) DAHLIA 1 Device by Does not apply route 4 (four) times a day (before meals and at bedtime) 1 Device 0    Continuous Blood Gluc Sensor (FREESTYLE ALEXIS 14 DAY SENSOR) [facility] USE 1  EVERY TWO WEEKS 2 each 0    imipramine (TOFRANIL) 50 mg tablet TAKE ONE TABLET BY MOUTH EVERY DAY 30 tablet 0    insulin aspart (NOVOLOG FLEXPEN) 100 Units/mL injection pen Inject 25 units at breakfast and 20 units at lunch and  20 units at dinner  20 pen 1    insulin detemir (LEVEMIR FLEXTOUCH) 100 Units/mL injection pen Inject 40 units in the morning and 35 units in the evening 10 pen 6    Insulin Pen Needle (B-D UF III MINI PEN NEEDLES) 31G X 5 MM MISC Use 2 needles daily 200 each 3    lisinopril (ZESTRIL) 10 mg tablet Take 1 tablet (10 mg total) by mouth daily 30 tablet 6    atorvastatin (LIPITOR) 40 mg tablet TAKE ONE TABLET BY MOUTH EVERY DAY (Patient not taking: Reported on 2019) 30 tablet 3    atorvastatin (LIPITOR) 40 mg tablet TAKE 1 TABLET BY MOUTH ONCE DAILY (Patient not taking: Reported on 2019) 30 tablet 6     No current facility-administered medications for this visit  No Known Allergies    Objective   Vitals: Height 5' 8 9" (1 75 m), weight 85 4 kg (188 lb 3 2 oz)  Physical Exam   Constitutional: He is oriented to person, place, and time  He appears well-developed and well-nourished  HENT:   Head: Normocephalic and atraumatic  Nose: Nose normal    Mouth/Throat: Oropharynx is clear and moist    Eyes: Pupils are equal, round, and reactive to light  Conjunctivae and EOM are normal    Wears glasses   Neck: Normal range of motion  Neck supple  Cardiovascular: Normal rate, regular rhythm, normal heart sounds and intact distal pulses  Pulmonary/Chest: Effort normal and breath sounds normal    Abdominal: Soft  Bowel sounds are normal    Musculoskeletal: Normal range of motion  Neurological: He is alert and oriented to person, place, and time  Skin: Skin is warm and dry  Dry skin   Psychiatric: He has a normal mood and affect  His behavior is normal  Judgment and thought content normal    Vitals reviewed        Lab Results:   Lab Results   Component Value Date/Time    Hemoglobin A1C 7 8 (H) 12/06/2019 08:23 AM    Hemoglobin A1C 7 6 (H) 08/20/2019 08:34 AM    Hemoglobin A1C 7 4 (H) 05/13/2019 09:17 AM    BUN 17 12/06/2019 08:23 AM    BUN 20 08/20/2019 08:34 AM    BUN 17 05/13/2019 09:17 AM    Potassium 4 5 12/06/2019 08:23 AM    Potassium 4 4 08/20/2019 08:34 AM    Potassium 5 0 05/13/2019 09:17 AM    Chloride 99 12/06/2019 08:23 AM    Chloride 100 08/20/2019 08:34 AM    Chloride 100 05/13/2019 09:17 AM    CO2 24 12/06/2019 08:23 AM    CO2 24 08/20/2019 08:34 AM    CO2 25 05/13/2019 09:17 AM    Creatinine 1 28 (H) 12/06/2019 08:23 AM    Creatinine 1 17 08/20/2019 08:34 AM    Creatinine 1 19 05/13/2019 09:17 AM    AST 29 12/06/2019 08:23 AM    AST 27 08/20/2019 08:34 AM    AST 23 05/13/2019 09:17 AM    ALT 46 (H) 12/06/2019 08:23 AM    ALT 33 08/20/2019 08:34 AM    ALT 31 05/13/2019 09:17 AM    Albumin 4 3 12/06/2019 08:23 AM    Albumin 4 4 08/20/2019 08:34 AM    Albumin 4 5 05/13/2019 09:17 AM    Globulin, Total 2 7 12/06/2019 08:23 AM    Globulin, Total 2 6 08/20/2019 08:34 AM    Globulin, Total 2 5 05/13/2019 09:17 AM    HDL 40 12/06/2019 08:23 AM    HDL 37 (L) 05/13/2019 09:17 AM    Triglycerides 226 (H) 12/06/2019 08:23 AM    Triglycerides 225 (H) 05/13/2019 09:17 AM     Portions of the record may have been created with voice recognition software  Occasional wrong word or "sound a like" substitutions may have occurred due to the inherent limitations of voice recognition software  Read the chart carefully and recognize, using context, where substitutions have occurred

## 2020-01-09 ENCOUNTER — OFFICE VISIT (OUTPATIENT)
Dept: FAMILY MEDICINE CLINIC | Facility: CLINIC | Age: 55
End: 2020-01-09
Payer: COMMERCIAL

## 2020-01-09 VITALS
WEIGHT: 187 LBS | TEMPERATURE: 98.2 F | BODY MASS INDEX: 28.34 KG/M2 | OXYGEN SATURATION: 98 % | HEIGHT: 68 IN | SYSTOLIC BLOOD PRESSURE: 138 MMHG | HEART RATE: 99 BPM | DIASTOLIC BLOOD PRESSURE: 84 MMHG

## 2020-01-09 DIAGNOSIS — E11.319 TYPE 2 DIABETES MELLITUS WITH RETINOPATHY, WITH LONG-TERM CURRENT USE OF INSULIN, MACULAR EDEMA PRESENCE UNSPECIFIED, UNSPECIFIED LATERALITY, UNSPECIFIED RETINOPATHY SEVERITY (HCC): ICD-10-CM

## 2020-01-09 DIAGNOSIS — E11.69 HYPERLIPIDEMIA ASSOCIATED WITH TYPE 2 DIABETES MELLITUS (HCC): ICD-10-CM

## 2020-01-09 DIAGNOSIS — F41.0 PANIC DISORDER: ICD-10-CM

## 2020-01-09 DIAGNOSIS — Z79.4 TYPE 2 DIABETES MELLITUS WITH RETINOPATHY, WITH LONG-TERM CURRENT USE OF INSULIN, MACULAR EDEMA PRESENCE UNSPECIFIED, UNSPECIFIED LATERALITY, UNSPECIFIED RETINOPATHY SEVERITY (HCC): ICD-10-CM

## 2020-01-09 DIAGNOSIS — I10 ESSENTIAL HYPERTENSION: Primary | ICD-10-CM

## 2020-01-09 DIAGNOSIS — E11.3293 MILD NONPROLIFERATIVE DIABETIC RETINOPATHY OF BOTH EYES WITHOUT MACULAR EDEMA ASSOCIATED WITH TYPE 2 DIABETES MELLITUS (HCC): ICD-10-CM

## 2020-01-09 DIAGNOSIS — E78.5 HYPERLIPIDEMIA ASSOCIATED WITH TYPE 2 DIABETES MELLITUS (HCC): ICD-10-CM

## 2020-01-09 PROCEDURE — 3008F BODY MASS INDEX DOCD: CPT | Performed by: FAMILY MEDICINE

## 2020-01-09 PROCEDURE — 1036F TOBACCO NON-USER: CPT | Performed by: FAMILY MEDICINE

## 2020-01-09 PROCEDURE — 3075F SYST BP GE 130 - 139MM HG: CPT | Performed by: FAMILY MEDICINE

## 2020-01-09 PROCEDURE — 99214 OFFICE O/P EST MOD 30 MIN: CPT | Performed by: FAMILY MEDICINE

## 2020-01-09 PROCEDURE — 3079F DIAST BP 80-89 MM HG: CPT | Performed by: FAMILY MEDICINE

## 2020-01-09 RX ORDER — IMIPRAMINE HCL 50 MG
50 TABLET ORAL DAILY
Qty: 30 TABLET | Refills: 11 | Status: SHIPPED | OUTPATIENT
Start: 2020-01-09 | End: 2021-01-07

## 2020-01-09 NOTE — PATIENT INSTRUCTIONS
Follow-up with endocrinologist   Erasto Tobar should schedule your diabetic eye exam   Continue to work on diet and weight control  I can see you on a yearly basis as long as long as you are compliant with endocrinologist     Heart Healthy Diet   AMBULATORY CARE:   A heart healthy diet  is an eating plan low in total fat, unhealthy fats, and sodium (salt)  A heart healthy diet helps decrease your risk for heart disease and stroke  Limit the amount of fat you eat to 25% to 35% of your total daily calories  Limit sodium to less than 2,300 mg each day  Healthy fats:  Healthy fats can help improve cholesterol levels  The risk for heart disease is decreased when cholesterol levels are normal  Choose healthy fats, such as the following:  · Unsaturated fat  is found in foods such as soybean, canola, olive, corn, and safflower oils  It is also found in soft tub margarine that is made with liquid vegetable oil  · Omega-3 fat  is found in certain fish, such as salmon, tuna, and trout, and in walnuts and flaxseed  Unhealthy fats:  Unhealthy fats can cause unhealthy cholesterol levels in your blood and increase your risk of heart disease  Limit unhealthy fats, such as the following:  · Cholesterol  is found in animal foods, such as eggs and lobster, and in dairy products made from whole milk  Limit cholesterol to less than 300 milligrams (mg) each day  You may need to limit cholesterol to 200 mg each day if you have heart disease  · Saturated fat  is found in meats, such as garcia and hamburger  It is also found in chicken or turkey skin, whole milk, and butter  Limit saturated fat to less than 7% of your total daily calories  Limit saturated fat to less than 6% if you have heart disease or are at increased risk for it  · Trans fat  is found in packaged foods, such as potato chips and cookies  It is also in hard margarine, some fried foods, and shortening  Avoid trans fats as much as possible    Heart healthy foods and drinks to include:  Ask your dietitian or healthcare provider how many servings to have from each of the following food groups:  · Grains:      ¨ Whole-wheat breads, cereals, and pastas, and brown rice    ¨ Low-fat, low-sodium crackers and chips    · Vegetables:      ¨ Broccoli, green beans, green peas, and spinach    ¨ Collards, kale, and lima beans    ¨ Carrots, sweet potatoes, tomatoes, and peppers    ¨ Canned vegetables with no salt added    · Fruits:      ¨ Bananas, peaches, pears, and pineapple    ¨ Grapes, raisins, and dates    ¨ Oranges, tangerines, grapefruit, orange juice, and grapefruit juice    ¨ Apricots, mangoes, melons, and papaya    ¨ Raspberries and strawberries    ¨ Canned fruit with no added sugar    · Low-fat dairy products:      ¨ Nonfat (skim) milk, 1% milk, and low-fat almond, cashew, or soy milks fortified with calcium    ¨ Low-fat cheese, regular or frozen yogurt, and cottage cheese    · Meats and proteins , such as lean cuts of beef and pork (loin, leg, round), skinless chicken and turkey, legumes, soy products, egg whites, and nuts  Foods and drinks to limit or avoid:  Ask your dietitian or healthcare provider about these and other foods that are high in unhealthy fat, sodium, and sugar:  · Snack or packaged foods , such as frozen dinners, cookies, macaroni and cheese, and cereals with more than 300 mg of sodium per serving    · Canned or dry mixes  for cakes, soups, sauces, or gravies    · Vegetables with added sodium , such as instant potatoes, vegetables with added sauces, or regular canned vegetables    · Other foods high in sodium , such as ketchup, barbecue sauce, salad dressing, pickles, olives, soy sauce, and miso    · High-fat dairy foods  such as whole or 2% milk, cream cheese, or sour cream, and cheeses     · High-fat protein foods  such as high-fat cuts of beef (T-bone steaks, ribs), chicken or turkey with skin, and organ meats, such as liver    · Cured or smoked meats , such as hot dogs, garcia, and sausage    · Unhealthy fats and oils , such as butter, stick margarine, shortening, and cooking oils such as coconut or palm oil    · Food and drinks high in sugar , such as soft drinks (soda), sports drinks, sweetened tea, candy, cake, cookies, pies, and doughnuts  Other diet guidelines to follow:   · Eat more foods containing omega-3 fats  Eat fish high in omega-3 fats at least 2 times a week  · Limit alcohol  Too much alcohol can damage your heart and raise your blood pressure  Women should limit alcohol to 1 drink a day  Men should limit alcohol to 2 drinks a day  A drink of alcohol is 12 ounces of beer, 5 ounces of wine, or 1½ ounces of liquor  · Choose low-sodium foods  High-sodium foods can lead to high blood pressure  Add little or no salt to food you prepare  Use herbs and spices in place of salt  · Eat more fiber  to help lower cholesterol levels  Eat at least 5 servings of fruits and vegetables each day  Eat 3 ounces of whole-grain foods each day  Legumes (beans) are also a good source of fiber  Lifestyle guidelines:   · Do not smoke  Nicotine and other chemicals in cigarettes and cigars can cause lung and heart damage  Ask your healthcare provider for information if you currently smoke and need help to quit  E-cigarettes or smokeless tobacco still contain nicotine  Talk to your healthcare provider before you use these products  · Exercise regularly  to help you maintain a healthy weight and improve your blood pressure and cholesterol levels  Ask your healthcare provider about the best exercise plan for you  Do not start an exercise program without asking your healthcare provider  Follow up with your healthcare provider as directed:  Write down your questions so you remember to ask them during your visits  © 2017 [employer] Information is for End User's use only and may not be sold, redistributed or otherwise used for commercial purposes   All illustrations and images included in CareNotes® are the copyrighted property of [employer]  or [employer]  The above information is an  only  It is not intended as medical advice for individual conditions or treatments  Talk to your doctor, nurse or pharmacist before following any medical regimen to see if it is safe and effective for you

## 2020-01-09 NOTE — PROGRESS NOTES
[address]        NAME: Conrad Nunes is a 62 y o  male  : 1965    MRN: [id number]  DATE: 2020  TIME: 10:35 AM    Assessment and Plan   Essential hypertension [I10]  1  Essential hypertension     2  Type 2 diabetes mellitus with retinopathy, with long-term current use of insulin, macular edema presence unspecified, unspecified laterality, unspecified retinopathy severity ([address] )     3  Panic disorder     4  BMI 28 0-28 9,adult         No problem-specific Assessment & Plan notes found for this encounter  Patient Instructions     Patient Instructions     Heart Healthy Diet   AMBULATORY CARE:   A heart healthy diet  is an eating plan low in total fat, unhealthy fats, and sodium (salt)  A heart healthy diet helps decrease your risk for heart disease and stroke  Limit the amount of fat you eat to 25% to 35% of your total daily calories  Limit sodium to less than 2,300 mg each day  Healthy fats:  Healthy fats can help improve cholesterol levels  The risk for heart disease is decreased when cholesterol levels are normal  Choose healthy fats, such as the following:  · Unsaturated fat  is found in foods such as soybean, canola, olive, corn, and safflower oils  It is also found in soft tub margarine that is made with liquid vegetable oil  · Omega-3 fat  is found in certain fish, such as salmon, tuna, and trout, and in walnuts and flaxseed  Unhealthy fats:  Unhealthy fats can cause unhealthy cholesterol levels in your blood and increase your risk of heart disease  Limit unhealthy fats, such as the following:  · Cholesterol  is found in animal foods, such as eggs and lobster, and in dairy products made from whole milk  Limit cholesterol to less than 300 milligrams (mg) each day  You may need to limit cholesterol to 200 mg each day if you have heart disease  · Saturated fat  is found in meats, such as garcia and hamburger   It is also found in chicken or turkey skin, whole milk, and butter  Limit saturated fat to less than 7% of your total daily calories  Limit saturated fat to less than 6% if you have heart disease or are at increased risk for it  · Trans fat  is found in packaged foods, such as potato chips and cookies  It is also in hard margarine, some fried foods, and shortening  Avoid trans fats as much as possible    Heart healthy foods and drinks to include:  Ask your dietitian or healthcare provider how many servings to have from each of the following food groups:  · Grains:      ¨ Whole-wheat breads, cereals, and pastas, and brown rice    ¨ Low-fat, low-sodium crackers and chips    · Vegetables:      ¨ Broccoli, green beans, green peas, and spinach    ¨ Collards, kale, and lima beans    ¨ Carrots, sweet potatoes, tomatoes, and peppers    ¨ Canned vegetables with no salt added    · Fruits:      ¨ Bananas, peaches, pears, and pineapple    ¨ Grapes, raisins, and dates    ¨ Oranges, tangerines, grapefruit, orange juice, and grapefruit juice    ¨ Apricots, mangoes, melons, and papaya    ¨ Raspberries and strawberries    ¨ Canned fruit with no added sugar    · Low-fat dairy products:      ¨ Nonfat (skim) milk, 1% milk, and low-fat almond, cashew, or soy milks fortified with calcium    ¨ Low-fat cheese, regular or frozen yogurt, and cottage cheese    · Meats and proteins , such as lean cuts of beef and pork (loin, leg, round), skinless chicken and turkey, legumes, soy products, egg whites, and nuts  Foods and drinks to limit or avoid:  Ask your dietitian or healthcare provider about these and other foods that are high in unhealthy fat, sodium, and sugar:  · Snack or packaged foods , such as frozen dinners, cookies, macaroni and cheese, and cereals with more than 300 mg of sodium per serving    · Canned or dry mixes  for cakes, soups, sauces, or gravies    · Vegetables with added sodium , such as instant potatoes, vegetables with added sauces, or regular canned vegetables    · Other foods high in sodium , such as ketchup, barbecue sauce, salad dressing, pickles, olives, soy sauce, and miso    · High-fat dairy foods  such as whole or 2% milk, cream cheese, or sour cream, and cheeses     · High-fat protein foods  such as high-fat cuts of beef (T-bone steaks, ribs), chicken or turkey with skin, and organ meats, such as liver    · Cured or smoked meats , such as hot dogs, garcia, and sausage    · Unhealthy fats and oils , such as butter, stick margarine, shortening, and cooking oils such as coconut or palm oil    · Food and drinks high in sugar , such as soft drinks (soda), sports drinks, sweetened tea, candy, cake, cookies, pies, and doughnuts  Other diet guidelines to follow:   · Eat more foods containing omega-3 fats  Eat fish high in omega-3 fats at least 2 times a week  · Limit alcohol  Too much alcohol can damage your heart and raise your blood pressure  Women should limit alcohol to 1 drink a day  Men should limit alcohol to 2 drinks a day  A drink of alcohol is 12 ounces of beer, 5 ounces of wine, or 1½ ounces of liquor  · Choose low-sodium foods  High-sodium foods can lead to high blood pressure  Add little or no salt to food you prepare  Use herbs and spices in place of salt  · Eat more fiber  to help lower cholesterol levels  Eat at least 5 servings of fruits and vegetables each day  Eat 3 ounces of whole-grain foods each day  Legumes (beans) are also a good source of fiber  Lifestyle guidelines:   · Do not smoke  Nicotine and other chemicals in cigarettes and cigars can cause lung and heart damage  Ask your healthcare provider for information if you currently smoke and need help to quit  E-cigarettes or smokeless tobacco still contain nicotine  Talk to your healthcare provider before you use these products  · Exercise regularly  to help you maintain a healthy weight and improve your blood pressure and cholesterol levels   Ask your healthcare provider about the best exercise plan for you  Do not start an exercise program without asking your healthcare provider  Follow up with your healthcare provider as directed:  Write down your questions so you remember to ask them during your visits  © 2017 [address] [employer] Information is for End User's use only and may not be sold, redistributed or otherwise used for commercial purposes  All illustrations and images included in CareNotes® are the copyrighted property of A D A M , Inc  or Martin Leiva  The above information is an  only  It is not intended as medical advice for individual conditions or treatments  Talk to your doctor, nurse or pharmacist before following any medical regimen to see if it is safe and effective for you  Chief Complaint     Chief Complaint   Patient presents with    Follow-up     BP/back pain         History of Present Illness       Medical management multiple issues-  1) diabetes-patient under care of Dr Pepito Gonzalez  Levels are improved  Still high  Patient does have mild diabetic retinopathy  Patient is due for diabetic eye exam   2) hypertension good control with current medication  3) hyperlipidemia-last levels were okay  4) panic attacks-patient takes imipramine 50 mg at bedtime which does control this  Review of Systems   Review of Systems   Constitutional: Negative for activity change, appetite change, diaphoresis and fatigue  Respiratory: Negative for cough, chest tightness, shortness of breath and wheezing  Cardiovascular: Negative for chest pain, palpitations and leg swelling  Fast or slow heart rate   Gastrointestinal: Negative for abdominal pain, blood in stool, constipation, diarrhea, nausea and vomiting  Genitourinary: Negative for difficulty urinating, dysuria, frequency and hematuria  Musculoskeletal: Positive for back pain     Neurological: Negative for dizziness, light-headedness and headaches  Psychiatric/Behavioral: Negative for agitation, confusion, dysphoric mood, sleep disturbance and suicidal ideas  The patient is nervous/anxious  Current Medications       Current Outpatient Medications:     ascorbic acid (VITAMIN C) 500 mg tablet, Take 500 mg by mouth daily, Disp: , Rfl:     aspirin (ECOTRIN LOW STRENGTH) 81 mg EC tablet, Take 81 mg by mouth daily, Disp: , Rfl:     atorvastatin (LIPITOR) 40 mg tablet, Take 1 tablet (40 mg total) by mouth daily, Disp: 30 tablet, Rfl: 6    Continuous Blood Gluc Sensor (FREESTYLE ALEXIS 14 DAY SENSOR) [facility], Utilize 1 sensor every 2 weeks, Disp: 2 each, Rfl: 6    imipramine (TOFRANIL) 50 mg tablet, TAKE ONE TABLET BY MOUTH EVERY DAY, Disp: 30 tablet, Rfl: 0    insulin aspart (NOVOLOG FLEXPEN) 100 Units/mL injection pen, Inject 24 units at breakfast,18 units at lunch and 18 units at dinner , Disp: 20 pen, Rfl: 2    insulin glargine (LANTUS SOLOSTAR) 100 units/mL injection pen, Inject 45 units in the morning and 38 units in the evening , Disp: 10 pen, Rfl: 6    lisinopril (ZESTRIL) 10 mg tablet, Take 1 tablet (10 mg total) by mouth daily, Disp: 30 tablet, Rfl: 6    Insulin Pen Needle (B-D UF III MINI PEN NEEDLES) 31G X 5 MM MISC, Use 2 needles daily (Patient not taking: Reported on 1/9/2020), Disp: 200 each, Rfl: 3    Current Allergies     Allergies as of 01/09/2020    (No Known Allergies)            The following portions of the patient's history were reviewed and updated as appropriate: allergies, current medications, past family history, past medical history, past social history, past surgical history and problem list      History reviewed  No pertinent past medical history  Past Surgical History:   Procedure Laterality Date    NO PAST SURGERIES         Family History   Adopted: Yes   Family history unknown: Yes         Medications have been verified          Objective   /84 (BP Location: Right arm, Patient Position: Sitting, Cuff Size: Extra-Large)   Pulse 99   Temp 98 2 °F (36 8 °C) (Tympanic)   Ht 5' 8" (1 727 m)   Wt 84 8 kg (187 lb)   SpO2 98%   BMI 28 43 kg/m²        Physical Exam     Physical Exam   Constitutional: He is oriented to person, place, and time  He appears well-developed and well-nourished  No distress  HENT:   Head: Normocephalic and atraumatic  Right Ear: Tympanic membrane, external ear and ear canal normal    Left Ear: Tympanic membrane, external ear and ear canal normal    Nose: No mucosal edema or rhinorrhea  Right sinus exhibits no maxillary sinus tenderness  Left sinus exhibits no maxillary sinus tenderness  Mouth/Throat: Uvula is midline  Mucous membranes are not pale and not dry  Normal dentition  No oropharyngeal exudate  Eyes: Pupils are equal, round, and reactive to light  EOM are normal  Right eye exhibits no discharge  Left eye exhibits no discharge  Neck: Normal range of motion  Neck supple  No thyromegaly present  Cardiovascular: Normal rate, regular rhythm, normal heart sounds and intact distal pulses  No murmur heard  Pulmonary/Chest: Effort normal and breath sounds normal  No respiratory distress  He has no wheezes  He has no rales  Abdominal: Soft  Bowel sounds are normal  He exhibits no distension and no mass  There is no tenderness  Musculoskeletal: Normal range of motion  He exhibits no edema or tenderness  Lymphadenopathy:     He has no cervical adenopathy  Neurological: He is alert and oriented to person, place, and time  No cranial nerve deficit  Skin: He is not diaphoretic  Psychiatric: He has a normal mood and affect  His behavior is normal    Vitals reviewed  BMI Counseling: Body mass index is 28 43 kg/m²  The BMI is above normal  Nutrition recommendations include reducing portion sizes, decreasing overall calorie intake, 3-5 servings of fruits/vegetables daily and moderation in carbohydrate intake

## 2020-01-23 ENCOUNTER — TELEPHONE (OUTPATIENT)
Dept: FAMILY MEDICINE CLINIC | Facility: CLINIC | Age: 55
End: 2020-01-23

## 2020-01-23 NOTE — TELEPHONE ENCOUNTER
Please see information waylon for insurance referral        Referral #: [id number]  Effective: 01/23/2020  Expires: 04/21/2020

## 2020-01-23 NOTE — TELEPHONE ENCOUNTER
Referral needed for [address]     [facility]- [phone]  APPOINTMENT 1/29/20

## 2020-01-31 DIAGNOSIS — Z79.4 TYPE 2 DIABETES MELLITUS WITH RETINOPATHY, WITH LONG-TERM CURRENT USE OF INSULIN, MACULAR EDEMA PRESENCE UNSPECIFIED, UNSPECIFIED LATERALITY, UNSPECIFIED RETINOPATHY SEVERITY (HCC): Primary | ICD-10-CM

## 2020-01-31 DIAGNOSIS — E11.319 TYPE 2 DIABETES MELLITUS WITH RETINOPATHY, WITH LONG-TERM CURRENT USE OF INSULIN, MACULAR EDEMA PRESENCE UNSPECIFIED, UNSPECIFIED LATERALITY, UNSPECIFIED RETINOPATHY SEVERITY (HCC): Primary | ICD-10-CM

## 2020-01-31 RX ORDER — FLASH GLUCOSE SENSOR
KIT MISCELLANEOUS
Qty: 2 EACH | Refills: 6 | Status: SHIPPED | OUTPATIENT
Start: 2020-01-31 | End: 2021-01-13

## 2020-01-31 NOTE — TELEPHONE ENCOUNTER
The Script for pt's Maik Maggie that the pharmacy has on file is for the 10 day sensor  They need a new script for the 14 day please  The script from 12/27 was printed so I don't think they ever received it

## 2020-06-02 DIAGNOSIS — E78.5 HYPERLIPIDEMIA, UNSPECIFIED HYPERLIPIDEMIA TYPE: ICD-10-CM

## 2020-06-03 RX ORDER — ATORVASTATIN CALCIUM 40 MG/1
TABLET, FILM COATED ORAL
Qty: 30 TABLET | Refills: 0 | Status: SHIPPED | OUTPATIENT
Start: 2020-06-03 | End: 2020-07-07

## 2020-07-07 DIAGNOSIS — E78.5 HYPERLIPIDEMIA, UNSPECIFIED HYPERLIPIDEMIA TYPE: ICD-10-CM

## 2020-07-07 RX ORDER — ATORVASTATIN CALCIUM 40 MG/1
TABLET, FILM COATED ORAL
Qty: 30 TABLET | Refills: 0 | Status: SHIPPED | OUTPATIENT
Start: 2020-07-07 | End: 2020-08-06

## 2020-08-05 DIAGNOSIS — E78.5 HYPERLIPIDEMIA, UNSPECIFIED HYPERLIPIDEMIA TYPE: ICD-10-CM

## 2020-08-06 RX ORDER — ATORVASTATIN CALCIUM 40 MG/1
TABLET, FILM COATED ORAL
Qty: 30 TABLET | Refills: 0 | Status: SHIPPED | OUTPATIENT
Start: 2020-08-06 | End: 2020-08-18 | Stop reason: SDUPTHER

## 2020-08-14 ENCOUNTER — TELEPHONE (OUTPATIENT)
Dept: FAMILY MEDICINE CLINIC | Facility: CLINIC | Age: 55
End: 2020-08-14

## 2020-08-14 LAB
ALBUMIN SERPL-MCNC: 4.8 G/DL (ref 3.8–4.9)
ALBUMIN/GLOB SERPL: 1.9 {RATIO} (ref 1.2–2.2)
ALP SERPL-CCNC: 70 IU/L (ref 39–117)
ALT SERPL-CCNC: 41 IU/L (ref 0–44)
AST SERPL-CCNC: 32 IU/L (ref 0–40)
BILIRUB SERPL-MCNC: 0.5 MG/DL (ref 0–1.2)
BUN SERPL-MCNC: 19 MG/DL (ref 6–24)
BUN/CREAT SERPL: 15 (ref 9–20)
CALCIUM SERPL-MCNC: 9.7 MG/DL (ref 8.7–10.2)
CHLORIDE SERPL-SCNC: 96 MMOL/L (ref 96–106)
CHOLEST SERPL-MCNC: 138 MG/DL (ref 100–199)
CHOLEST/HDLC SERPL: 3.6 RATIO (ref 0–5)
CO2 SERPL-SCNC: 25 MMOL/L (ref 20–29)
CREAT SERPL-MCNC: 1.23 MG/DL (ref 0.76–1.27)
EST. AVERAGE GLUCOSE BLD GHB EST-MCNC: 174 MG/DL
GLOBULIN SER-MCNC: 2.5 G/DL (ref 1.5–4.5)
GLUCOSE SERPL-MCNC: 100 MG/DL (ref 65–99)
HBA1C MFR BLD: 7.7 % (ref 4.8–5.6)
HDLC SERPL-MCNC: 38 MG/DL
LDLC SERPL CALC-MCNC: 73 MG/DL (ref 0–99)
POTASSIUM SERPL-SCNC: 4.1 MMOL/L (ref 3.5–5.2)
PROT SERPL-MCNC: 7.3 G/DL (ref 6–8.5)
SL AMB EGFR AFRICAN AMERICAN: 75 ML/MIN/1.73
SL AMB EGFR NON AFRICAN AMERICAN: 65 ML/MIN/1.73
SL AMB VLDL CHOLESTEROL CALC: 27 MG/DL (ref 5–40)
SODIUM SERPL-SCNC: 138 MMOL/L (ref 134–144)
TRIGL SERPL-MCNC: 136 MG/DL (ref 0–149)

## 2020-08-14 PROCEDURE — 3051F HG A1C>EQUAL 7.0%<8.0%: CPT | Performed by: INTERNAL MEDICINE

## 2020-08-14 NOTE — TELEPHONE ENCOUNTER
Valid   Referral #: [id number]  Effective: 08/14/2020  Expires: 11/11/2020    Referral [id number] has been successfully submitted  Maikol Stein   [address]  [address] [place] [id number]      PATIENT'S INSURANCE  Member ID: [id number]     PRIMARY CARE PHYSICIAN  [facility]  NPI: [id number]  From  [facility]  NPI: [id number]  [address], [address]   To  [facility]  NPI: [id number]  [address], [address], [address], [address], [place]   ,      Service Type:  Medical Care (Consult and Treat)   Place of Service:  Office      Note to Patient   This referral is valid at any location for the above group  Clinical Information     Diagnoses (1)   Diagnosis    1  E11 319 - Type 2 diabetes mellitus with unspecified diabetic retinopathy without macular edema       Procedures (1)   Procedure    1  06804       Disclaimer:   [employer] and its Affiliates (IB) will pay for only those services covered under the [place] [address] which are specifically noted and requested by the PCP or OBGYN on the referral  If any additional services, testing, or follow-up care are required, the PCP or OBGYN must be contacted prior to the delivery of such additional services for written approval on a separate referral  Non-referred services will not be covered by [facility]  Benefits are underwritten or administered by [facility], a subsidiary of [employer], which are independent licensees of the [employer] and [employer]

## 2020-08-18 ENCOUNTER — OFFICE VISIT (OUTPATIENT)
Dept: ENDOCRINOLOGY | Facility: HOSPITAL | Age: 55
End: 2020-08-18
Payer: COMMERCIAL

## 2020-08-18 VITALS
BODY MASS INDEX: 28.31 KG/M2 | TEMPERATURE: 97.8 F | DIASTOLIC BLOOD PRESSURE: 70 MMHG | HEART RATE: 88 BPM | HEIGHT: 68 IN | WEIGHT: 186.8 LBS | SYSTOLIC BLOOD PRESSURE: 126 MMHG

## 2020-08-18 DIAGNOSIS — Z79.4 TYPE 2 DIABETES MELLITUS WITH RETINOPATHY, WITH LONG-TERM CURRENT USE OF INSULIN, MACULAR EDEMA PRESENCE UNSPECIFIED, UNSPECIFIED LATERALITY, UNSPECIFIED RETINOPATHY SEVERITY (HCC): ICD-10-CM

## 2020-08-18 DIAGNOSIS — I10 ESSENTIAL HYPERTENSION: ICD-10-CM

## 2020-08-18 DIAGNOSIS — E11.319 TYPE 2 DIABETES MELLITUS WITH RETINOPATHY, WITH LONG-TERM CURRENT USE OF INSULIN, MACULAR EDEMA PRESENCE UNSPECIFIED, UNSPECIFIED LATERALITY, UNSPECIFIED RETINOPATHY SEVERITY (HCC): ICD-10-CM

## 2020-08-18 DIAGNOSIS — E78.5 HYPERLIPIDEMIA, UNSPECIFIED HYPERLIPIDEMIA TYPE: ICD-10-CM

## 2020-08-18 PROCEDURE — 1036F TOBACCO NON-USER: CPT | Performed by: INTERNAL MEDICINE

## 2020-08-18 PROCEDURE — 4010F ACE/ARB THERAPY RXD/TAKEN: CPT | Performed by: INTERNAL MEDICINE

## 2020-08-18 PROCEDURE — 3078F DIAST BP <80 MM HG: CPT | Performed by: INTERNAL MEDICINE

## 2020-08-18 PROCEDURE — 99214 OFFICE O/P EST MOD 30 MIN: CPT | Performed by: INTERNAL MEDICINE

## 2020-08-18 PROCEDURE — 3074F SYST BP LT 130 MM HG: CPT | Performed by: INTERNAL MEDICINE

## 2020-08-18 PROCEDURE — 3008F BODY MASS INDEX DOCD: CPT | Performed by: INTERNAL MEDICINE

## 2020-08-18 RX ORDER — ATORVASTATIN CALCIUM 40 MG/1
40 TABLET, FILM COATED ORAL DAILY
Qty: 30 TABLET | Refills: 11 | Status: SHIPPED | OUTPATIENT
Start: 2020-08-18 | End: 2021-09-03

## 2020-08-18 RX ORDER — INSULIN GLARGINE 100 [IU]/ML
INJECTION, SOLUTION SUBCUTANEOUS
Qty: 15 PEN | Refills: 3 | Status: SHIPPED | OUTPATIENT
Start: 2020-08-18 | End: 2020-09-08

## 2020-08-18 RX ORDER — MELATONIN
1000 DAILY
COMMUNITY

## 2020-08-18 RX ORDER — LISINOPRIL 10 MG/1
10 TABLET ORAL DAILY
Qty: 30 TABLET | Refills: 11 | Status: SHIPPED | OUTPATIENT
Start: 2020-08-18 | End: 2021-09-03

## 2020-08-18 NOTE — PROGRESS NOTES
8/18/2020    Assessment/Plan      Diagnoses and all orders for this visit:    Type 2 diabetes mellitus with retinopathy, with long-term current use of insulin, macular edema presence unspecified, unspecified laterality, unspecified retinopathy severity (HCC)  -     insulin glargine (Lantus SoloStar) 100 units/mL injection pen; Inject 40 units in the morning and 32 units in the evening   -     Discontinue: insulin aspart (NovoLOG) 100 Units/mL injection pen; Inject 24 units at breakfast,18 units at lunch and 18 units at dinner   -     Hemoglobin A1C; Future  -     Comprehensive metabolic panel; Future  -     Microalbumin / creatinine urine ratio; Future  -     TSH, 3rd generation; Future  -     Hemoglobin A1C  -     Comprehensive metabolic panel  -     Microalbumin / creatinine urine ratio  -     TSH, 3rd generation  -     insulin aspart (NovoLOG) 100 Units/mL injection pen; Inject 26 units at breakfast,18 units at lunch and 18 units at dinner  Essential hypertension  -     lisinopril (ZESTRIL) 10 mg tablet; Take 1 tablet (10 mg total) by mouth daily    Hyperlipidemia, unspecified hyperlipidemia type  -     atorvastatin (LIPITOR) 40 mg tablet; Take 1 tablet (40 mg total) by mouth daily    Other orders  -     cholecalciferol (VITAMIN D3) 1,000 units tablet; Take 1,000 Units by mouth daily        Assessment/Plan:  1  Type 2 diabetes:  His A1c is slightly above goal, though more recently his time within target range of 90% indicates much better control in the more recent past   I did suggest we increase his breakfast NovoLog dose to 26 units and continue 18 units with lunch and with dinner  Continue current Lantus regimen  Asked him to download a Olga Filler report in 1-2 weeks for review for additional changes  Follow-up in 4 months with labs as ordered above just prior  Asked him to call me sooner with any concerns regarding hypoglycemia  2  Hypertension:  Normotensive in the office today on current regimen      3  Hyperlipidemia:  Doing well on current regimen  CC: Diabetes Consult    History of Present Illness     HPI: Ofe Romero is a 60y o  year old male with type 2 diabetes for about 20 years  He is on insulin at home and takes Lantus 40 units in the morning and 32 units in the evening, NovoLog 24 units with breakfast, 18 units at lunch, 18 units at dinner plus scale for correction  He denies any polyuria, polydipsia, nocturia and blurry vision  He denies nephropathy but does admit to neuropathy and retinopathy  Hypoglycemic episodes: Yes but occurs infrequently  Reminded him to keep fast acting carbohydrates nearby at all times        The patient's last eye exam was recently and we will try to acquire this report  Blood Sugar/Glucometer/Pump/CGM review:  Freestyle Adry download from 08/05 through 8/18 shows an average glucose of 134  90% values are within range  1% are low  For hypertension he is treated with lisinopril 10 mg daily  For hyperlipidemia is treated with atorvastatin 40 mg daily  Review of Systems   Constitutional: Negative for fatigue  HENT: Negative for trouble swallowing and voice change  Eyes: Negative for visual disturbance  Respiratory: Negative for shortness of breath  Cardiovascular: Negative for palpitations and leg swelling  Gastrointestinal: Negative for abdominal pain, nausea and vomiting  Endocrine: Negative for polydipsia and polyuria  Musculoskeletal: Negative for arthralgias and myalgias  Skin: Negative for rash  Neurological: Negative for dizziness, tremors and weakness  Hematological: Negative for adenopathy  Psychiatric/Behavioral: Negative for agitation and confusion  Historical Information   History reviewed  No pertinent past medical history    Past Surgical History:   Procedure Laterality Date    NO PAST SURGERIES       Social History   Social History     Substance and Sexual Activity   Alcohol Use Not Currently     Social History     Substance and Sexual Activity   Drug Use No     Social History     Tobacco Use   Smoking Status Former Smoker    Packs/day: 1 00    Years: 20 00    Pack years: 20 00    Types: Cigarettes    Last attempt to quit: 2008    Years since quittin 3   Smokeless Tobacco Never Used     Family History:   Family History   Adopted: Yes   Family history unknown: Yes       Meds/Allergies   Current Outpatient Medications   Medication Sig Dispense Refill    ascorbic acid (VITAMIN C) 500 mg tablet Take 500 mg by mouth daily      aspirin (ECOTRIN LOW STRENGTH) 81 mg EC tablet Take 81 mg by mouth daily      atorvastatin (LIPITOR) 40 mg tablet Take 1 tablet (40 mg total) by mouth daily 30 tablet 11    cholecalciferol (VITAMIN D3) 1,000 units tablet Take 1,000 Units by mouth daily      Continuous Blood Gluc Sensor ([employer] ALEXIS 14 DAY SENSOR) MISC Utilize 1 sensor every 2 weeks 2 each 6    imipramine (TOFRANIL) 50 mg tablet Take 1 tablet (50 mg total) by mouth daily 30 tablet 11    insulin aspart (NovoLOG) 100 Units/mL injection pen Inject 26 units at breakfast,18 units at lunch and 18 units at dinner  20 pen 3    insulin glargine (Lantus SoloStar) 100 units/mL injection pen Inject 40 units in the morning and 32 units in the evening  15 pen 3    Insulin Pen Needle (B-D UF III MINI PEN NEEDLES) 31G X 5 MM MISC Use 2 needles daily 200 each 3    lisinopril (ZESTRIL) 10 mg tablet Take 1 tablet (10 mg total) by mouth daily 30 tablet 11     No current facility-administered medications for this visit  No Known Allergies    Objective   Vitals: Blood pressure 126/70, pulse 88, temperature 97 8 °F (36 6 °C), height 5' 8" (1 727 m), weight 84 7 kg (186 lb 12 8 oz)  Invasive Devices     None                 Physical Exam  Vitals signs reviewed  Constitutional:       General: He is not in acute distress  Appearance: He is well-developed  He is not diaphoretic     HENT:      Head: Normocephalic and atraumatic  Eyes:      Conjunctiva/sclera: Conjunctivae normal       Pupils: Pupils are equal, round, and reactive to light  Neck:      Musculoskeletal: Normal range of motion and neck supple  Thyroid: No thyromegaly  Cardiovascular:      Rate and Rhythm: Normal rate and regular rhythm  Pulses:           Dorsalis pedis pulses are 2+ on the right side and 2+ on the left side  Posterior tibial pulses are 2+ on the right side and 2+ on the left side  Pulmonary:      Effort: Pulmonary effort is normal  No respiratory distress  Breath sounds: Normal breath sounds  Abdominal:      General: Bowel sounds are normal       Palpations: Abdomen is soft  Musculoskeletal: Normal range of motion  Feet:      Right foot:      Skin integrity: Callus and dry skin present  No ulcer, skin breakdown, erythema or warmth  Left foot:      Skin integrity: Callus and dry skin present  No ulcer, skin breakdown, erythema or warmth  Skin:     General: Skin is warm and dry  Findings: No rash  Neurological:      Mental Status: He is alert and oriented to person, place, and time  Motor: No abnormal muscle tone  Psychiatric:         Behavior: Behavior normal      Patient's shoes and socks removed  Right Foot/Ankle   Right Foot Inspection  Skin Exam: skin normal, skin intact, dry skin, callus and callus no warmth, no erythema, no maceration, no abnormal color, no pre-ulcer and no ulcer                            Sensory   Vibration: intact    Monofilament testing: intact  Vascular    The right DP pulse is 2+  The right PT pulse is 2+  Left Foot/Ankle  Left Foot Inspection  Skin Exam: skin normal, skin intact, dry skin and callusno warmth, no erythema, no maceration, normal color, no pre-ulcer and no ulcer                                         Sensory   Vibration: intact    Monofilament: intact  Vascular    The left DP pulse is 2+  The left PT pulse is 2+     Assign Risk Category:  No deformity present; No loss of protective sensation;        Risk: 0        The history was obtained from the review of the chart and from the patient  Lab Results:    Most recent Alc is  Lab Results   Component Value Date    HGBA1C 7 7 (H) 08/13/2020           No components found for: HA1C  No components found for: GLU    Lab Results   Component Value Date    CREATININE 1 23 08/13/2020    CREATININE 1 28 (H) 12/06/2019    CREATININE 1 17 08/20/2019    BUN 19 08/13/2020     (L) 10/10/2017    K 4 1 08/13/2020    CL 96 08/13/2020    CO2 25 08/13/2020     No results found for: EGFR  No components found for: [facility]    Lab Results   Component Value Date    HDL 38 (L) 08/13/2020    TRIG 136 08/13/2020    CHOLHDL 3 6 08/13/2020       Lab Results   Component Value Date    ALT 41 08/13/2020    AST 32 08/13/2020       Lab Results   Component Value Date    TSH 2 120 12/06/2019           No future appointments  Portions of the record may have been created with voice recognition software  Occasional wrong word or "sound a like" substitutions may have occurred due to the inherent limitations of voice recognition software  Read the chart carefully and recognize, using context, where substitutions have occurred

## 2020-09-05 DIAGNOSIS — Z79.4 TYPE 2 DIABETES MELLITUS WITH RETINOPATHY, WITH LONG-TERM CURRENT USE OF INSULIN, MACULAR EDEMA PRESENCE UNSPECIFIED, UNSPECIFIED LATERALITY, UNSPECIFIED RETINOPATHY SEVERITY (HCC): ICD-10-CM

## 2020-09-05 DIAGNOSIS — E11.319 TYPE 2 DIABETES MELLITUS WITH RETINOPATHY, WITH LONG-TERM CURRENT USE OF INSULIN, MACULAR EDEMA PRESENCE UNSPECIFIED, UNSPECIFIED LATERALITY, UNSPECIFIED RETINOPATHY SEVERITY (HCC): ICD-10-CM

## 2020-09-08 RX ORDER — INSULIN GLARGINE 100 [IU]/ML
INJECTION, SOLUTION SUBCUTANEOUS
Qty: 30 ML | Refills: 6 | Status: SHIPPED | OUTPATIENT
Start: 2020-09-08 | End: 2021-04-28 | Stop reason: SDUPTHER

## 2020-10-23 DIAGNOSIS — E11.319 TYPE 2 DIABETES MELLITUS WITH RETINOPATHY, WITH LONG-TERM CURRENT USE OF INSULIN, MACULAR EDEMA PRESENCE UNSPECIFIED, UNSPECIFIED LATERALITY, UNSPECIFIED RETINOPATHY SEVERITY (HCC): Primary | ICD-10-CM

## 2020-10-23 DIAGNOSIS — Z79.4 TYPE 2 DIABETES MELLITUS WITH RETINOPATHY, WITH LONG-TERM CURRENT USE OF INSULIN, MACULAR EDEMA PRESENCE UNSPECIFIED, UNSPECIFIED LATERALITY, UNSPECIFIED RETINOPATHY SEVERITY (HCC): Primary | ICD-10-CM

## 2020-10-23 RX ORDER — PEN NEEDLE, DIABETIC 31 GX5/16"
NEEDLE, DISPOSABLE MISCELLANEOUS
Qty: 500 EACH | Refills: 3 | Status: SHIPPED | OUTPATIENT
Start: 2020-10-23 | End: 2022-06-15 | Stop reason: SDUPTHER

## 2020-12-19 LAB
ALBUMIN SERPL-MCNC: 4.3 G/DL (ref 3.8–4.9)
ALBUMIN/CREAT UR: <3 MG/G CREAT (ref 0–29)
ALBUMIN/GLOB SERPL: 2 {RATIO} (ref 1.2–2.2)
ALP SERPL-CCNC: 72 IU/L (ref 39–117)
ALT SERPL-CCNC: 43 IU/L (ref 0–44)
AST SERPL-CCNC: 41 IU/L (ref 0–40)
BILIRUB SERPL-MCNC: 0.4 MG/DL (ref 0–1.2)
BUN SERPL-MCNC: 17 MG/DL (ref 6–24)
BUN/CREAT SERPL: 14 (ref 9–20)
CALCIUM SERPL-MCNC: 9.1 MG/DL (ref 8.7–10.2)
CHLORIDE SERPL-SCNC: 100 MMOL/L (ref 96–106)
CO2 SERPL-SCNC: 26 MMOL/L (ref 20–29)
CREAT SERPL-MCNC: 1.24 MG/DL (ref 0.76–1.27)
CREAT UR-MCNC: 114.4 MG/DL
EST. AVERAGE GLUCOSE BLD GHB EST-MCNC: 163 MG/DL
GLOBULIN SER-MCNC: 2.2 G/DL (ref 1.5–4.5)
GLUCOSE SERPL-MCNC: 146 MG/DL (ref 65–99)
HBA1C MFR BLD: 7.3 % (ref 4.8–5.6)
MICROALBUMIN UR-MCNC: <3 UG/ML
POTASSIUM SERPL-SCNC: 4.4 MMOL/L (ref 3.5–5.2)
PROT SERPL-MCNC: 6.5 G/DL (ref 6–8.5)
SL AMB EGFR AFRICAN AMERICAN: 74 ML/MIN/1.73
SL AMB EGFR NON AFRICAN AMERICAN: 64 ML/MIN/1.73
SODIUM SERPL-SCNC: 138 MMOL/L (ref 134–144)
TSH SERPL DL<=0.005 MIU/L-ACNC: 2.11 UIU/ML (ref 0.45–4.5)

## 2020-12-21 ENCOUNTER — TELEPHONE (OUTPATIENT)
Dept: FAMILY MEDICINE CLINIC | Facility: CLINIC | Age: 55
End: 2020-12-21

## 2020-12-22 ENCOUNTER — OFFICE VISIT (OUTPATIENT)
Dept: ENDOCRINOLOGY | Facility: HOSPITAL | Age: 55
End: 2020-12-22
Payer: COMMERCIAL

## 2020-12-22 VITALS
HEART RATE: 104 BPM | WEIGHT: 185.2 LBS | HEIGHT: 68 IN | DIASTOLIC BLOOD PRESSURE: 70 MMHG | SYSTOLIC BLOOD PRESSURE: 116 MMHG | BODY MASS INDEX: 28.07 KG/M2

## 2020-12-22 DIAGNOSIS — E78.5 HYPERLIPIDEMIA ASSOCIATED WITH TYPE 2 DIABETES MELLITUS (HCC): Primary | ICD-10-CM

## 2020-12-22 DIAGNOSIS — Z79.4 TYPE 2 DIABETES MELLITUS WITH RETINOPATHY, WITH LONG-TERM CURRENT USE OF INSULIN, MACULAR EDEMA PRESENCE UNSPECIFIED, UNSPECIFIED LATERALITY, UNSPECIFIED RETINOPATHY SEVERITY (HCC): ICD-10-CM

## 2020-12-22 DIAGNOSIS — I10 ESSENTIAL HYPERTENSION: ICD-10-CM

## 2020-12-22 DIAGNOSIS — E11.319 TYPE 2 DIABETES MELLITUS WITH RETINOPATHY, WITH LONG-TERM CURRENT USE OF INSULIN, MACULAR EDEMA PRESENCE UNSPECIFIED, UNSPECIFIED LATERALITY, UNSPECIFIED RETINOPATHY SEVERITY (HCC): ICD-10-CM

## 2020-12-22 DIAGNOSIS — E11.69 HYPERLIPIDEMIA ASSOCIATED WITH TYPE 2 DIABETES MELLITUS (HCC): Primary | ICD-10-CM

## 2020-12-22 PROCEDURE — 1036F TOBACCO NON-USER: CPT | Performed by: INTERNAL MEDICINE

## 2020-12-22 PROCEDURE — 3074F SYST BP LT 130 MM HG: CPT | Performed by: INTERNAL MEDICINE

## 2020-12-22 PROCEDURE — 3008F BODY MASS INDEX DOCD: CPT | Performed by: INTERNAL MEDICINE

## 2020-12-22 PROCEDURE — 3078F DIAST BP <80 MM HG: CPT | Performed by: INTERNAL MEDICINE

## 2020-12-22 PROCEDURE — 99214 OFFICE O/P EST MOD 30 MIN: CPT | Performed by: INTERNAL MEDICINE

## 2021-01-07 DIAGNOSIS — F41.0 PANIC DISORDER: ICD-10-CM

## 2021-01-07 RX ORDER — IMIPRAMINE HCL 50 MG
TABLET ORAL
Qty: 30 TABLET | Refills: 11 | Status: SHIPPED | OUTPATIENT
Start: 2021-01-07 | End: 2021-01-09

## 2021-01-09 DIAGNOSIS — F41.0 PANIC DISORDER: ICD-10-CM

## 2021-01-09 RX ORDER — IMIPRAMINE HCL 50 MG
TABLET ORAL
Qty: 30 TABLET | Refills: 11 | Status: SHIPPED | OUTPATIENT
Start: 2021-01-09 | End: 2022-01-04

## 2021-01-13 DIAGNOSIS — Z79.4 TYPE 2 DIABETES MELLITUS WITH RETINOPATHY, WITH LONG-TERM CURRENT USE OF INSULIN, MACULAR EDEMA PRESENCE UNSPECIFIED, UNSPECIFIED LATERALITY, UNSPECIFIED RETINOPATHY SEVERITY (HCC): ICD-10-CM

## 2021-01-13 DIAGNOSIS — E11.319 TYPE 2 DIABETES MELLITUS WITH RETINOPATHY, WITH LONG-TERM CURRENT USE OF INSULIN, MACULAR EDEMA PRESENCE UNSPECIFIED, UNSPECIFIED LATERALITY, UNSPECIFIED RETINOPATHY SEVERITY (HCC): ICD-10-CM

## 2021-01-13 RX ORDER — FLASH GLUCOSE SENSOR
KIT MISCELLANEOUS
Qty: 2 EACH | Refills: 0 | Status: SHIPPED | OUTPATIENT
Start: 2021-01-13 | End: 2021-02-16

## 2021-02-15 DIAGNOSIS — Z79.4 TYPE 2 DIABETES MELLITUS WITH RETINOPATHY, WITH LONG-TERM CURRENT USE OF INSULIN, MACULAR EDEMA PRESENCE UNSPECIFIED, UNSPECIFIED LATERALITY, UNSPECIFIED RETINOPATHY SEVERITY (HCC): ICD-10-CM

## 2021-02-15 DIAGNOSIS — E11.319 TYPE 2 DIABETES MELLITUS WITH RETINOPATHY, WITH LONG-TERM CURRENT USE OF INSULIN, MACULAR EDEMA PRESENCE UNSPECIFIED, UNSPECIFIED LATERALITY, UNSPECIFIED RETINOPATHY SEVERITY (HCC): ICD-10-CM

## 2021-02-16 RX ORDER — FLASH GLUCOSE SENSOR
KIT MISCELLANEOUS
Qty: 2 EACH | Refills: 0 | Status: SHIPPED | OUTPATIENT
Start: 2021-02-16 | End: 2021-03-16

## 2021-03-10 DIAGNOSIS — Z23 ENCOUNTER FOR IMMUNIZATION: ICD-10-CM

## 2021-03-15 DIAGNOSIS — E11.319 TYPE 2 DIABETES MELLITUS WITH RETINOPATHY, WITH LONG-TERM CURRENT USE OF INSULIN, MACULAR EDEMA PRESENCE UNSPECIFIED, UNSPECIFIED LATERALITY, UNSPECIFIED RETINOPATHY SEVERITY (HCC): ICD-10-CM

## 2021-03-15 DIAGNOSIS — Z79.4 TYPE 2 DIABETES MELLITUS WITH RETINOPATHY, WITH LONG-TERM CURRENT USE OF INSULIN, MACULAR EDEMA PRESENCE UNSPECIFIED, UNSPECIFIED LATERALITY, UNSPECIFIED RETINOPATHY SEVERITY (HCC): ICD-10-CM

## 2021-03-16 ENCOUNTER — IMMUNIZATIONS (OUTPATIENT)
Dept: FAMILY MEDICINE CLINIC | Facility: HOSPITAL | Age: 56
End: 2021-03-16

## 2021-03-16 DIAGNOSIS — Z23 ENCOUNTER FOR IMMUNIZATION: Primary | ICD-10-CM

## 2021-03-16 PROCEDURE — 91300 SARS-COV-2 / COVID-19 MRNA VACCINE (PFIZER-BIONTECH) 30 MCG: CPT

## 2021-03-16 PROCEDURE — 0001A SARS-COV-2 / COVID-19 MRNA VACCINE (PFIZER-BIONTECH) 30 MCG: CPT

## 2021-03-16 RX ORDER — FLASH GLUCOSE SENSOR
KIT MISCELLANEOUS
Qty: 2 EACH | Refills: 0 | Status: SHIPPED | OUTPATIENT
Start: 2021-03-16 | End: 2021-04-12

## 2021-04-06 ENCOUNTER — IMMUNIZATIONS (OUTPATIENT)
Dept: FAMILY MEDICINE CLINIC | Facility: HOSPITAL | Age: 56
End: 2021-04-06

## 2021-04-06 DIAGNOSIS — Z23 ENCOUNTER FOR IMMUNIZATION: Primary | ICD-10-CM

## 2021-04-06 PROCEDURE — 91300 SARS-COV-2 / COVID-19 MRNA VACCINE (PFIZER-BIONTECH) 30 MCG: CPT

## 2021-04-06 PROCEDURE — 0002A SARS-COV-2 / COVID-19 MRNA VACCINE (PFIZER-BIONTECH) 30 MCG: CPT

## 2021-04-12 DIAGNOSIS — E11.319 TYPE 2 DIABETES MELLITUS WITH RETINOPATHY, WITH LONG-TERM CURRENT USE OF INSULIN, MACULAR EDEMA PRESENCE UNSPECIFIED, UNSPECIFIED LATERALITY, UNSPECIFIED RETINOPATHY SEVERITY (HCC): ICD-10-CM

## 2021-04-12 DIAGNOSIS — Z79.4 TYPE 2 DIABETES MELLITUS WITH RETINOPATHY, WITH LONG-TERM CURRENT USE OF INSULIN, MACULAR EDEMA PRESENCE UNSPECIFIED, UNSPECIFIED LATERALITY, UNSPECIFIED RETINOPATHY SEVERITY (HCC): ICD-10-CM

## 2021-04-12 RX ORDER — FLASH GLUCOSE SENSOR
KIT MISCELLANEOUS
Qty: 2 EACH | Refills: 0 | Status: SHIPPED | OUTPATIENT
Start: 2021-04-12 | End: 2021-04-28 | Stop reason: SDUPTHER

## 2021-04-21 LAB
ALBUMIN SERPL-MCNC: 4.5 G/DL (ref 3.8–4.9)
ALBUMIN/CREAT UR: 12 MG/G CREAT (ref 0–29)
ALBUMIN/GLOB SERPL: 1.7 {RATIO} (ref 1.2–2.2)
ALP SERPL-CCNC: 70 IU/L (ref 39–117)
ALT SERPL-CCNC: 37 IU/L (ref 0–44)
AST SERPL-CCNC: 30 IU/L (ref 0–40)
BASOPHILS # BLD AUTO: 0.1 X10E3/UL (ref 0–0.2)
BASOPHILS NFR BLD AUTO: 1 %
BILIRUB SERPL-MCNC: 0.5 MG/DL (ref 0–1.2)
BUN SERPL-MCNC: 16 MG/DL (ref 6–24)
BUN/CREAT SERPL: 13 (ref 9–20)
CALCIUM SERPL-MCNC: 9.6 MG/DL (ref 8.7–10.2)
CHLORIDE SERPL-SCNC: 98 MMOL/L (ref 96–106)
CHOLEST SERPL-MCNC: 147 MG/DL (ref 100–199)
CO2 SERPL-SCNC: 26 MMOL/L (ref 20–29)
CREAT SERPL-MCNC: 1.25 MG/DL (ref 0.76–1.27)
CREAT UR-MCNC: 85 MG/DL
EOSINOPHIL # BLD AUTO: 0.2 X10E3/UL (ref 0–0.4)
EOSINOPHIL NFR BLD AUTO: 3 %
ERYTHROCYTE [DISTWIDTH] IN BLOOD BY AUTOMATED COUNT: 13.1 % (ref 11.6–15.4)
EST. AVERAGE GLUCOSE BLD GHB EST-MCNC: 174 MG/DL
GLOBULIN SER-MCNC: 2.6 G/DL (ref 1.5–4.5)
GLUCOSE SERPL-MCNC: 157 MG/DL (ref 65–99)
HBA1C MFR BLD: 7.7 % (ref 4.8–5.6)
HCT VFR BLD AUTO: 44.3 % (ref 37.5–51)
HDLC SERPL-MCNC: 37 MG/DL
HGB BLD-MCNC: 14.8 G/DL (ref 13–17.7)
IMM GRANULOCYTES # BLD: 0 X10E3/UL (ref 0–0.1)
IMM GRANULOCYTES NFR BLD: 0 %
LDLC SERPL CALC-MCNC: 77 MG/DL (ref 0–99)
LDLC/HDLC SERPL: 2.1 RATIO (ref 0–3.6)
LYMPHOCYTES # BLD AUTO: 1.8 X10E3/UL (ref 0.7–3.1)
LYMPHOCYTES NFR BLD AUTO: 29 %
MCH RBC QN AUTO: 29.5 PG (ref 26.6–33)
MCHC RBC AUTO-ENTMCNC: 33.4 G/DL (ref 31.5–35.7)
MCV RBC AUTO: 88 FL (ref 79–97)
MICROALBUMIN UR-MCNC: 10.6 UG/ML
MONOCYTES # BLD AUTO: 0.7 X10E3/UL (ref 0.1–0.9)
MONOCYTES NFR BLD AUTO: 12 %
NEUTROPHILS # BLD AUTO: 3.5 X10E3/UL (ref 1.4–7)
NEUTROPHILS NFR BLD AUTO: 55 %
PLATELET # BLD AUTO: 265 X10E3/UL (ref 150–450)
POTASSIUM SERPL-SCNC: 4.4 MMOL/L (ref 3.5–5.2)
PROT SERPL-MCNC: 7.1 G/DL (ref 6–8.5)
RBC # BLD AUTO: 5.01 X10E6/UL (ref 4.14–5.8)
SL AMB EGFR AFRICAN AMERICAN: 73 ML/MIN/1.73
SL AMB EGFR NON AFRICAN AMERICAN: 63 ML/MIN/1.73
SL AMB VLDL CHOLESTEROL CALC: 33 MG/DL (ref 5–40)
SODIUM SERPL-SCNC: 136 MMOL/L (ref 134–144)
TRIGL SERPL-MCNC: 198 MG/DL (ref 0–149)
TSH SERPL DL<=0.005 MIU/L-ACNC: 2.06 UIU/ML (ref 0.45–4.5)
WBC # BLD AUTO: 6.2 X10E3/UL (ref 3.4–10.8)

## 2021-04-21 PROCEDURE — 3051F HG A1C>EQUAL 7.0%<8.0%: CPT | Performed by: INTERNAL MEDICINE

## 2021-04-21 PROCEDURE — 3061F NEG MICROALBUMINURIA REV: CPT | Performed by: INTERNAL MEDICINE

## 2021-04-26 ENCOUNTER — TELEPHONE (OUTPATIENT)
Dept: FAMILY MEDICINE CLINIC | Facility: CLINIC | Age: 56
End: 2021-04-26

## 2021-04-26 NOTE — TELEPHONE ENCOUNTER
Referral Details Jeremie Flannery  Male born on 1965 (60 yrs old)   New/Copy  Referral Search  View/Print as PDF  Valid Referral #: [id number] Effective: 04/26/2021 Expires: 07/24/2021   Referral [id number] has been successfully submitted  Jeremie Flannery  [address] [place], [place] [id number]    PATIENT'S INSURANCE  Member ID: [id number]    PRIMARY CARE PHYSICIAN  [facility]  NPI: [id number]  From  [address] [facility] [facility]  NPI: [id number]  [address]  [address]  [facility], [address]  To  [facility]  NPI: [id number]  [address], [address] [facility], [address] [facility]  ,    Service Type: Medical Care (Consult and Treat)  Place of Service: Office    Note to Patient  This referral is valid at any location for the above group    Clinical Information     Diagnoses (1)    Diagnosis  1   E11 69 - Type 2 diabetes mellitus with other specified complication     Procedures (1)    Procedure  1   76[address]

## 2021-04-28 ENCOUNTER — OFFICE VISIT (OUTPATIENT)
Dept: ENDOCRINOLOGY | Facility: HOSPITAL | Age: 56
End: 2021-04-28
Payer: COMMERCIAL

## 2021-04-28 VITALS
BODY MASS INDEX: 28.46 KG/M2 | DIASTOLIC BLOOD PRESSURE: 78 MMHG | SYSTOLIC BLOOD PRESSURE: 124 MMHG | HEIGHT: 68 IN | HEART RATE: 104 BPM | WEIGHT: 187.8 LBS

## 2021-04-28 DIAGNOSIS — E11.69 HYPERLIPIDEMIA ASSOCIATED WITH TYPE 2 DIABETES MELLITUS (HCC): Primary | ICD-10-CM

## 2021-04-28 DIAGNOSIS — E11.319 TYPE 2 DIABETES MELLITUS WITH RETINOPATHY, WITH LONG-TERM CURRENT USE OF INSULIN, MACULAR EDEMA PRESENCE UNSPECIFIED, UNSPECIFIED LATERALITY, UNSPECIFIED RETINOPATHY SEVERITY (HCC): ICD-10-CM

## 2021-04-28 DIAGNOSIS — I10 ESSENTIAL HYPERTENSION: ICD-10-CM

## 2021-04-28 DIAGNOSIS — Z79.4 TYPE 2 DIABETES MELLITUS WITH RETINOPATHY, WITH LONG-TERM CURRENT USE OF INSULIN, MACULAR EDEMA PRESENCE UNSPECIFIED, UNSPECIFIED LATERALITY, UNSPECIFIED RETINOPATHY SEVERITY (HCC): ICD-10-CM

## 2021-04-28 DIAGNOSIS — E78.5 HYPERLIPIDEMIA ASSOCIATED WITH TYPE 2 DIABETES MELLITUS (HCC): Primary | ICD-10-CM

## 2021-04-28 PROCEDURE — 3078F DIAST BP <80 MM HG: CPT | Performed by: INTERNAL MEDICINE

## 2021-04-28 PROCEDURE — 1036F TOBACCO NON-USER: CPT | Performed by: INTERNAL MEDICINE

## 2021-04-28 PROCEDURE — 99214 OFFICE O/P EST MOD 30 MIN: CPT | Performed by: INTERNAL MEDICINE

## 2021-04-28 PROCEDURE — 3074F SYST BP LT 130 MM HG: CPT | Performed by: INTERNAL MEDICINE

## 2021-04-28 PROCEDURE — 3008F BODY MASS INDEX DOCD: CPT | Performed by: INTERNAL MEDICINE

## 2021-04-28 RX ORDER — INSULIN GLARGINE 100 [IU]/ML
INJECTION, SOLUTION SUBCUTANEOUS
Qty: 30 ML | Refills: 11 | Status: SHIPPED | OUTPATIENT
Start: 2021-04-28 | End: 2022-03-09 | Stop reason: SDUPTHER

## 2021-04-28 RX ORDER — FLASH GLUCOSE SENSOR
KIT MISCELLANEOUS
Qty: 2 EACH | Refills: 11 | Status: SHIPPED | OUTPATIENT
Start: 2021-04-28 | End: 2022-03-09 | Stop reason: SDUPTHER

## 2021-04-28 NOTE — PATIENT INSTRUCTIONS
Change insulin:carbohydrate ratio at dinner only to 1 unit for every 8 grams of carbs  Continue 1 unit for every 10 grams of carbs for other meals and continue current sliding scale  Continue current lantus

## 2021-04-28 NOTE — PROGRESS NOTES
4/28/2021    Assessment/Plan      Diagnoses and all orders for this visit:    Hyperlipidemia associated with type 2 diabetes mellitus ([facility] )    Essential hypertension    Type 2 diabetes mellitus with retinopathy, with long-term current use of insulin, macular edema presence unspecified, unspecified laterality, unspecified retinopathy severity ([facility] )  -     Hemoglobin A1C; Future  -     Comprehensive metabolic panel; Future  -     Microalbumin / creatinine urine ratio; Future  -     TSH, 3rd generation; Future  -     Hemoglobin A1C  -     Comprehensive metabolic panel  -     Microalbumin / creatinine urine ratio  -     TSH, 3rd generation  -     Continuous Blood Gluc Sensor (FreeStyle Adry 14 Day Sensor) MISC; q 14 days  -     insulin glargine (Lantus SoloStar) 100 units/mL injection pen; INJECT 40 UNITS UNDER THE SKIN IN THE MORNING AND 32 UNITS IN THE EVENING  -     Fructosamine- Lab Collect; Future  -     Fructosamine- Lab Collect        Assessment/Plan:   1  Type 2 diabetes with long-term insulin use:  Overall blood sugars are doing reasonable and time in range is at goal though A1c is slightly above goal   His pattern blood sugars showed numbers do increase later in the evening such as after dinner  I would suggest he utilizes an insulin to carbohydrate ratio 1 unit for every 8 g of carbs with dinner  I would suggest continue 1 unit for every 10 g of carbs with breakfast, lunch, snack  Continue current Lantus regimen  Follow-up in 4 months asked him to call sooner with any questions or concerns such as hypoglycemia  2  Hypertension:  Normotensive the office today on current regimen  3 Hyperlipidemia:  Doing well on current regimen  CC: Diabetes Consult    History of Present Illness     HPI: Mila Whiteside is a 60y o  year old male with type 2 diabetes for over 20 years    He is on insulin at home and takes Lantus 40 units in the morning and 32 units in the evening,   NovoLog at an insulin: Carbohydrate ratio 1:10 a m  and sliding scale 2 extra units for every 25 above goal which is 100  He denies any polyuria, polydipsia, nocturia and blurry vision  He denies nephropathy but does admit to neuropathy and retinopathy  Hypoglycemic episodes: Yes But rare  Reminded him to keep fast acting carbohydrates nearby at all times  The patient's last eye exam was  Recently though I do not have a report of this and he reports  There are procedures planned such as cataract  The patient's last foot exam was in August 2020 in our office  Blood Sugar/Glucometer/Pump/CGM review:   Adry download from 04/15 through 4/28 shows an average sugar 151 with a variability index of 27 1%  78% values are within range  1% are low in the remainder above target  He is having hyperglycemia occurring later in the evening suggest after dinner  For htnn, treated with lisinopril 10 mg daily  For hld, continues on atorvastatin 40 mg daily  Review of Systems   Constitutional: Negative for fatigue  HENT: Negative for trouble swallowing and voice change  Eyes: Negative for visual disturbance  Respiratory: Negative for shortness of breath  Cardiovascular: Negative for palpitations and leg swelling  Gastrointestinal: Negative for abdominal pain, nausea and vomiting  Endocrine: Negative for polydipsia and polyuria  Musculoskeletal: Negative for arthralgias and myalgias  Skin: Negative for rash  Neurological: Negative for dizziness, tremors and weakness  Hematological: Negative for adenopathy  Psychiatric/Behavioral: Negative for agitation and confusion  Historical Information   History reviewed  No pertinent past medical history    Past Surgical History:   Procedure Laterality Date    NO PAST SURGERIES       Social History   Social History     Substance and Sexual Activity   Alcohol Use Not Currently     Social History     Substance and Sexual Activity   Drug Use No     Social History Tobacco Use   Smoking Status Former Smoker    Packs/day: 1 00    Years: 20 00    Pack years: 20 00    Types: Cigarettes    Quit date: 2008    Years since quittin 0   Smokeless Tobacco Never Used     Family History:   Family History   Adopted: Yes   Family history unknown: Yes       Meds/Allergies   Current Outpatient Medications   Medication Sig Dispense Refill    ascorbic acid (VITAMIN C) 500 mg tablet Take 500 mg by mouth daily      aspirin (ECOTRIN LOW STRENGTH) 81 mg EC tablet Take 81 mg by mouth daily      atorvastatin (LIPITOR) 40 mg tablet Take 1 tablet (40 mg total) by mouth daily 30 tablet 11    cholecalciferol (VITAMIN D3) 1,000 units tablet Take 1,000 Units by mouth daily      Continuous Blood Gluc Sensor (FreeStyle Adry 14 Day Sensor) MISC q 14 days 2 each 11    imipramine (TOFRANIL) 50 mg tablet TAKE ONE TABLET BY MOUTH EVERY DAY 30 tablet 11    insulin aspart (NovoLOG) 100 Units/mL injection pen Up to 75 units daily  15 pen 3    insulin glargine (Lantus SoloStar) 100 units/mL injection pen INJECT 40 UNITS UNDER THE SKIN IN THE MORNING AND 32 UNITS IN THE EVENING 30 mL 11    Insulin Pen Needle (B-D UF III MINI PEN NEEDLES) 31G X 5 MM MISC Use 5 needles daily 500 each 3    lisinopril (ZESTRIL) 10 mg tablet Take 1 tablet (10 mg total) by mouth daily 30 tablet 11     No current facility-administered medications for this visit  No Known Allergies    Objective   Vitals: Blood pressure 124/78, pulse 104, height 5' 8" (1 727 m), weight 85 2 kg (187 lb 12 8 oz)  Invasive Devices     None                 Physical Exam  Vitals signs reviewed  Constitutional:       General: He is not in acute distress  Appearance: He is well-developed  He is not diaphoretic  HENT:      Head: Normocephalic and atraumatic  Eyes:      Conjunctiva/sclera: Conjunctivae normal       Pupils: Pupils are equal, round, and reactive to light     Neck:      Musculoskeletal: Normal range of motion and neck supple  Thyroid: No thyromegaly  Cardiovascular:      Rate and Rhythm: Normal rate and regular rhythm  Pulmonary:      Effort: Pulmonary effort is normal  No respiratory distress  Breath sounds: Normal breath sounds  Abdominal:      General: Bowel sounds are normal       Palpations: Abdomen is soft  Musculoskeletal: Normal range of motion  Skin:     General: Skin is warm and dry  Findings: No rash  Neurological:      Mental Status: He is alert and oriented to person, place, and time  Motor: No abnormal muscle tone  Psychiatric:         Behavior: Behavior normal          The history was obtained from the review of the chart and from the patient  Lab Results:    Most recent Alc is  Lab Results   Component Value Date    HGBA1C 7 7 (H) 04/20/2021           No components found for: HA1C  No components found for: GLU    Lab Results   Component Value Date    CREATININE 1 25 04/20/2021    CREATININE 1 24 12/18/2020    CREATININE 1 23 08/13/2020    BUN 16 04/20/2021     (L) 10/10/2017    K 4 4 04/20/2021    CL 98 04/20/2021    CO2 26 04/20/2021     No results found for: EGFR  No components found for: [facility]    Lab Results   Component Value Date    HDL 37 (L) 04/20/2021    TRIG 198 (H) 04/20/2021    CHOLHDL 3 6 08/13/2020       Lab Results   Component Value Date    ALT 37 04/20/2021    AST 30 04/20/2021       Lab Results   Component Value Date    TSH 2 060 04/20/2021             Future Appointments   Date Time Provider Naheed Stoddard   9/7/2021  1:15 PM PREETHI Delarosa [facility]       Portions of the record may have been created with voice recognition software  Occasional wrong word or "sound a like" substitutions may have occurred due to the inherent limitations of voice recognition software  Read the chart carefully and recognize, using context, where substitutions have occurred

## 2021-05-20 ENCOUNTER — VBI (OUTPATIENT)
Dept: ADMINISTRATIVE | Facility: OTHER | Age: 56
End: 2021-05-20

## 2021-09-03 DIAGNOSIS — I10 ESSENTIAL HYPERTENSION: ICD-10-CM

## 2021-09-03 DIAGNOSIS — E78.5 HYPERLIPIDEMIA, UNSPECIFIED HYPERLIPIDEMIA TYPE: ICD-10-CM

## 2021-09-03 LAB
ALBUMIN SERPL-MCNC: 4.5 G/DL (ref 3.8–4.9)
ALBUMIN/CREAT UR: <6 MG/G CREAT (ref 0–29)
ALBUMIN/GLOB SERPL: 2 {RATIO} (ref 1.2–2.2)
ALP SERPL-CCNC: 63 IU/L (ref 48–121)
ALT SERPL-CCNC: 29 IU/L (ref 0–44)
AST SERPL-CCNC: 23 IU/L (ref 0–40)
BILIRUB SERPL-MCNC: 0.4 MG/DL (ref 0–1.2)
BUN SERPL-MCNC: 15 MG/DL (ref 6–24)
BUN/CREAT SERPL: 13 (ref 9–20)
CALCIUM SERPL-MCNC: 9.3 MG/DL (ref 8.7–10.2)
CHLORIDE SERPL-SCNC: 100 MMOL/L (ref 96–106)
CO2 SERPL-SCNC: 25 MMOL/L (ref 20–29)
CREAT SERPL-MCNC: 1.18 MG/DL (ref 0.76–1.27)
CREAT UR-MCNC: 51.5 MG/DL
EST. AVERAGE GLUCOSE BLD GHB EST-MCNC: 163 MG/DL
FRUCTOSAMINE SERPL-SCNC: 281 UMOL/L (ref 0–285)
GLOBULIN SER-MCNC: 2.2 G/DL (ref 1.5–4.5)
GLUCOSE SERPL-MCNC: 143 MG/DL (ref 65–99)
HBA1C MFR BLD: 7.3 % (ref 4.8–5.6)
MICROALBUMIN UR-MCNC: <3 UG/ML
POTASSIUM SERPL-SCNC: 4.6 MMOL/L (ref 3.5–5.2)
PROT SERPL-MCNC: 6.7 G/DL (ref 6–8.5)
SL AMB EGFR AFRICAN AMERICAN: 78 ML/MIN/1.73
SL AMB EGFR NON AFRICAN AMERICAN: 68 ML/MIN/1.73
SODIUM SERPL-SCNC: 138 MMOL/L (ref 134–144)
TSH SERPL DL<=0.005 MIU/L-ACNC: 1.72 UIU/ML (ref 0.45–4.5)

## 2021-09-03 RX ORDER — ATORVASTATIN CALCIUM 40 MG/1
TABLET, FILM COATED ORAL
Qty: 30 TABLET | Refills: 11 | Status: SHIPPED | OUTPATIENT
Start: 2021-09-03 | End: 2022-06-15 | Stop reason: SDUPTHER

## 2021-09-03 RX ORDER — LISINOPRIL 10 MG/1
TABLET ORAL
Qty: 30 TABLET | Refills: 11 | Status: SHIPPED | OUTPATIENT
Start: 2021-09-03 | End: 2022-06-15 | Stop reason: SDUPTHER

## 2021-09-07 ENCOUNTER — OFFICE VISIT (OUTPATIENT)
Dept: ENDOCRINOLOGY | Facility: HOSPITAL | Age: 56
End: 2021-09-07
Payer: COMMERCIAL

## 2021-09-07 VITALS
BODY MASS INDEX: 27.31 KG/M2 | HEART RATE: 92 BPM | DIASTOLIC BLOOD PRESSURE: 60 MMHG | WEIGHT: 180.2 LBS | HEIGHT: 68 IN | SYSTOLIC BLOOD PRESSURE: 110 MMHG

## 2021-09-07 DIAGNOSIS — I10 ESSENTIAL HYPERTENSION: ICD-10-CM

## 2021-09-07 DIAGNOSIS — Z79.4 TYPE 2 DIABETES MELLITUS WITH RETINOPATHY, WITH LONG-TERM CURRENT USE OF INSULIN, MACULAR EDEMA PRESENCE UNSPECIFIED, UNSPECIFIED LATERALITY, UNSPECIFIED RETINOPATHY SEVERITY (HCC): Primary | ICD-10-CM

## 2021-09-07 DIAGNOSIS — E11.319 TYPE 2 DIABETES MELLITUS WITH RETINOPATHY, WITH LONG-TERM CURRENT USE OF INSULIN, MACULAR EDEMA PRESENCE UNSPECIFIED, UNSPECIFIED LATERALITY, UNSPECIFIED RETINOPATHY SEVERITY (HCC): Primary | ICD-10-CM

## 2021-09-07 DIAGNOSIS — E11.69 HYPERLIPIDEMIA ASSOCIATED WITH TYPE 2 DIABETES MELLITUS (HCC): ICD-10-CM

## 2021-09-07 DIAGNOSIS — E11.3293 MILD NONPROLIFERATIVE DIABETIC RETINOPATHY OF BOTH EYES WITHOUT MACULAR EDEMA ASSOCIATED WITH TYPE 2 DIABETES MELLITUS (HCC): ICD-10-CM

## 2021-09-07 DIAGNOSIS — E78.5 HYPERLIPIDEMIA ASSOCIATED WITH TYPE 2 DIABETES MELLITUS (HCC): ICD-10-CM

## 2021-09-07 PROCEDURE — 3078F DIAST BP <80 MM HG: CPT | Performed by: NURSE PRACTITIONER

## 2021-09-07 PROCEDURE — 95251 CONT GLUC MNTR ANALYSIS I&R: CPT | Performed by: NURSE PRACTITIONER

## 2021-09-07 PROCEDURE — 1036F TOBACCO NON-USER: CPT | Performed by: NURSE PRACTITIONER

## 2021-09-07 PROCEDURE — 99214 OFFICE O/P EST MOD 30 MIN: CPT | Performed by: NURSE PRACTITIONER

## 2021-09-07 PROCEDURE — 3074F SYST BP LT 130 MM HG: CPT | Performed by: NURSE PRACTITIONER

## 2021-09-07 PROCEDURE — 3008F BODY MASS INDEX DOCD: CPT | Performed by: NURSE PRACTITIONER

## 2021-09-07 NOTE — PATIENT INSTRUCTIONS
Be mindful of diet       Exercise regularly and stay hydrated       Continue NovoLog at current dose  Increase A M  Levemir to 44 units  Continue  Levemir 32 units in the evening      Make sure you are injecting insulin consistently      Continue to utilize the freestyle jeremy      Please come by the office in approximately 2-3 weeks for download of your freestyle jeremy      Contact the office with any consistent hypoglycemia      Continue lisinopril  - consistently       Continue atorvastatin      Obtain lab work prior to next visit      Obtain a diabetic eye exam with retina specialist   Please ask your ophthalmologist to send us a report to our office

## 2021-09-07 NOTE — PROGRESS NOTES
Pt viewed via portal Terri Boyce 60 y o  male MRN: [id number]    Encounter: [id number]      Assessment/Plan     Assessment: This is a 60y o -year-old male with  type 2 diabetes with retinopathy and long-term insulin use, hypertension and hyperlipidemia  Plan:  1   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:  His most recent hemoglobin A1c is  Slightly above goal at 7 3   However, review of his Freestyle Adry download report reveals that he is relatively well controlled throughout the day with no hypoglycemia   For now, I have asked him to increase his Levemir dose to 44 units in the morning and  Continue 32 units in the evening  Insulin to carbohydrate ranges will remain the same at meals  He will continue to utilize the freestyle [facility] and for the report to the office in 2 weeks for review and further adjustment, if necessary  I encouraged him to have his diabetic eye exam completed  Check hemoglobin A1c prior to next visit         2   Hypertension: Abel Duke is normotensive in the office today   Continue lisinopril   Check comprehensive metabolic panel prior to next visit      3   Hyperlipidemia:  Continue atorvastatin   Check fasting lipid panel prior to next visit  CC: Type 2 Diabetes follow-up    History of Present Illness     HPI:  55 y  o  year old male with type 2 diabetes for 19 years   He is on insulin at home and takes Levemir 32 units in the evening and 40 units in the morning with NovoLog 1 unit for every 10 g of carbs with breakfast, lunch and snack    He is utilizing 1 unit for every 8 g of carbs with dinner   Her most recent hemoglobin A1c from August 1, 2020 is 7 3 with a fructosamine value of 281   He denies any recent episodes of hypoglycemia, polyuria, polydipsia, nocturia and blurry vision   He denies neuropathy but does admit to neuropathy and retinopathy       He obtained his annual diabetic eye exam in November 2018   He has mild retinopathy   He complains of some discomfort to his feet intermittently but does not follow podiatry for regular diabetic foot care  Diabetic foot exam was performed at last office visit on August 27, 2019        Blood Sugar/Glucometer/Pump/CGM review: Kalpana Arnett of his personal freestyle jeremy from August 25 through September 7, 2021 reveals relatively well controlled blood sugars throughout the day   His average glucose was 142 with a standard deviation of  24 1  He is in target range 86% of the time with 13% high and less than 1% low      For his hypertension, he is treated with lisinopril 10 mg daily   He states his compliance with his lisinopril has improved      His hyperlipidemia is treated with atorvastatin 40 mg daily       Review of Systems   Constitutional: Negative  Negative for chills, fatigue and fever  HENT: Negative  Negative for trouble swallowing and voice change  Eyes: Negative for photophobia, pain, discharge, redness, itching and visual disturbance  Respiratory: Negative for cough and shortness of breath  Cardiovascular: Negative for chest pain and palpitations  Gastrointestinal: Negative for abdominal pain, constipation, diarrhea, nausea and vomiting  Endocrine: Negative for cold intolerance, heat intolerance, polydipsia, polyphagia and polyuria  Genitourinary: Negative  Musculoskeletal: Positive for back pain (chronic low back)  Skin: Negative  Allergic/Immunologic: Negative  Neurological: Negative for syncope, light-headedness and headaches  Hematological: Negative  Psychiatric/Behavioral: Negative  All other systems reviewed and are negative  Historical Information   No past medical history on file    Past Surgical History:   Procedure Laterality Date    NO PAST SURGERIES       Social History   Social History     Substance and Sexual Activity   Alcohol Use Not Currently     Social History     Substance and Sexual Activity   Drug Use No     Social History     Tobacco Use   Smoking Status Former Smoker    Packs/day: 1 00    Years: 20 00    Pack years: 20 00    Types: Cigarettes    Quit date: 2008    Years since quittin 4   Smokeless Tobacco Never Used     Family History:   Family History   Adopted: Yes   Family history unknown: Yes       Meds/Allergies   Current Outpatient Medications   Medication Sig Dispense Refill    ascorbic acid (VITAMIN C) 500 mg tablet Take 500 mg by mouth daily      aspirin (ECOTRIN LOW STRENGTH) 81 mg EC tablet Take 81 mg by mouth daily      atorvastatin (LIPITOR) 40 mg tablet TAKE ONE TABLET BY MOUTH EVERY DAY 30 tablet 11    cholecalciferol (VITAMIN D3) 1,000 units tablet Take 1,000 Units by mouth daily      Continuous Blood Gluc Sensor (FreeStyle Adry 14 Day Sensor) MISC q 14 days 2 each 11    imipramine (TOFRANIL) 50 mg tablet TAKE ONE TABLET BY MOUTH EVERY DAY 30 tablet 11    insulin aspart (NovoLOG) 100 Units/mL injection pen Up to 75 units daily  15 pen 3    insulin glargine (Lantus SoloStar) 100 units/mL injection pen INJECT 40 UNITS UNDER THE SKIN IN THE MORNING AND 32 UNITS IN THE EVENING 30 mL 11    Insulin Pen Needle (B-D UF III MINI PEN NEEDLES) 31G X 5 MM MISC Use 5 needles daily 500 each 3    lisinopril (ZESTRIL) 10 mg tablet TAKE ONE TABLET BY MOUTH EVERY DAY 30 tablet 11     No current facility-administered medications for this visit  No Known Allergies    Objective   Vitals: Blood pressure 110/60, pulse 92, height 5' 8" (1 727 m), weight 81 7 kg (180 lb 3 2 oz)  Physical Exam  Vitals reviewed  Constitutional:       Appearance: He is well-developed  HENT:      Head: Normocephalic and atraumatic  Nose: Nose normal    Eyes:      Conjunctiva/sclera: Conjunctivae normal       Pupils: Pupils are equal, round, and reactive to light  Cardiovascular:      Rate and Rhythm: Normal rate and regular rhythm  Pulses: no weak pulses          Dorsalis pedis pulses are 1+ on the right side and 1+ on the left side          Posterior tibial pulses are 1+ on the right side and 1+ on the left side  Heart sounds: Normal heart sounds  Pulmonary:      Effort: Pulmonary effort is normal       Breath sounds: Normal breath sounds  Abdominal:      General: Bowel sounds are normal       Palpations: Abdomen is soft  Musculoskeletal:         General: Normal range of motion  Cervical back: Normal range of motion and neck supple  Feet:      Right foot:      Skin integrity: No ulcer, skin breakdown, erythema, warmth, callus or dry skin  Left foot:      Skin integrity: No ulcer, skin breakdown, erythema, warmth, callus or dry skin  Skin:     General: Skin is warm and dry  Neurological:      Mental Status: He is alert and oriented to person, place, and time  Psychiatric:         Behavior: Behavior normal          Thought Content: Thought content normal          Judgment: Judgment normal        Patient's shoes and socks removed  Right Foot/Ankle   Right Foot Inspection  Skin Exam: skin normal and skin intact no dry skin, no warmth, no callus, no erythema, no maceration, no abnormal color, no pre-ulcer, no ulcer and no callus                          Toe Exam: ROM and strength within normal limits  Sensory       Monofilament testing: intact  Vascular  Capillary refills: < 3 seconds  The right DP pulse is 1+  The right PT pulse is 1+  Left Foot/Ankle  Left Foot Inspection  Skin Exam: skin normal and skin intactno dry skin, no warmth, no erythema, no maceration, normal color, no pre-ulcer, no ulcer and no callus                         Toe Exam: ROM and strength within normal limits                   Sensory       Monofilament: intact  Vascular  Capillary refills: < 3 seconds  The left DP pulse is 1+  The left PT pulse is 1+  Assign Risk Category:  No deformity present; No loss of protective sensation;  No weak pulses       Risk: 0        Lab Results:   Lab Results   Component Value Date/Time    Hemoglobin A1C 7 3 (H) 09/01/2021 08:50 AM Hemoglobin A1C 7 7 (H) 04/20/2021 10:01 AM    Hemoglobin A1C 7 3 (H) 12/18/2020 08:31 AM    White Blood Cell Count 6 2 04/20/2021 10:01 AM    Hemoglobin 14 8 04/20/2021 10:01 AM    HCT 44 3 04/20/2021 10:01 AM    MCV 88 04/20/2021 10:01 AM    Platelet Count 353 03/50/1039 10:01 AM    BUN 15 09/01/2021 08:50 AM    BUN 16 04/20/2021 10:01 AM    BUN 17 12/18/2020 08:31 AM    Potassium 4 6 09/01/2021 08:50 AM    Potassium 4 4 04/20/2021 10:01 AM    Potassium 4 4 12/18/2020 08:31 AM    Chloride 100 09/01/2021 08:50 AM    Chloride 98 04/20/2021 10:01 AM    Chloride 100 12/18/2020 08:31 AM    CO2 25 09/01/2021 08:50 AM    CO2 26 04/20/2021 10:01 AM    CO2 26 12/18/2020 08:31 AM    Creatinine 1 18 09/01/2021 08:50 AM    Creatinine 1 25 04/20/2021 10:01 AM    Creatinine 1 24 12/18/2020 08:31 AM    AST 23 09/01/2021 08:50 AM    AST 30 04/20/2021 10:01 AM    AST 41 (H) 12/18/2020 08:31 AM    ALT 29 09/01/2021 08:50 AM    ALT 37 04/20/2021 10:01 AM    ALT 43 12/18/2020 08:31 AM    Albumin 4 5 09/01/2021 08:50 AM    Albumin 4 5 04/20/2021 10:01 AM    Albumin 4 3 12/18/2020 08:31 AM    Globulin, Total 2 2 09/01/2021 08:50 AM    Globulin, Total 2 6 04/20/2021 10:01 AM    Globulin, Total 2 2 12/18/2020 08:31 AM    HDL 37 (L) 04/20/2021 10:01 AM    Triglycerides 198 (H) 04/20/2021 10:01 AM       Portions of the record may have been created with voice recognition software  Occasional wrong word or "sound a like" substitutions may have occurred due to the inherent limitations of voice recognition software  Read the chart carefully and recognize, using context, where substitutions have occurred

## 2021-12-02 ENCOUNTER — TELEPHONE (OUTPATIENT)
Dept: FAMILY MEDICINE CLINIC | Facility: CLINIC | Age: 56
End: 2021-12-02

## 2021-12-03 LAB
ALBUMIN SERPL-MCNC: 4.6 G/DL (ref 3.8–4.9)
ALBUMIN/GLOB SERPL: 1.8 {RATIO} (ref 1.2–2.2)
ALP SERPL-CCNC: 75 IU/L (ref 44–121)
ALT SERPL-CCNC: 43 IU/L (ref 0–44)
AST SERPL-CCNC: 31 IU/L (ref 0–40)
BASOPHILS # BLD AUTO: 0.1 X10E3/UL (ref 0–0.2)
BASOPHILS NFR BLD AUTO: 1 %
BILIRUB SERPL-MCNC: 0.5 MG/DL (ref 0–1.2)
BUN SERPL-MCNC: 13 MG/DL (ref 6–24)
BUN/CREAT SERPL: 10 (ref 9–20)
CALCIUM SERPL-MCNC: 10 MG/DL (ref 8.7–10.2)
CHLORIDE SERPL-SCNC: 100 MMOL/L (ref 96–106)
CHOLEST SERPL-MCNC: 147 MG/DL (ref 100–199)
CHOLEST/HDLC SERPL: 3.9 RATIO (ref 0–5)
CO2 SERPL-SCNC: 26 MMOL/L (ref 20–29)
CREAT SERPL-MCNC: 1.28 MG/DL (ref 0.76–1.27)
EOSINOPHIL # BLD AUTO: 0.2 X10E3/UL (ref 0–0.4)
EOSINOPHIL NFR BLD AUTO: 3 %
ERYTHROCYTE [DISTWIDTH] IN BLOOD BY AUTOMATED COUNT: 13.1 % (ref 11.6–15.4)
EST. AVERAGE GLUCOSE BLD GHB EST-MCNC: 171 MG/DL
GLOBULIN SER-MCNC: 2.5 G/DL (ref 1.5–4.5)
GLUCOSE SERPL-MCNC: 121 MG/DL (ref 65–99)
HBA1C MFR BLD: 7.6 % (ref 4.8–5.6)
HCT VFR BLD AUTO: 44.2 % (ref 37.5–51)
HDLC SERPL-MCNC: 38 MG/DL
HGB BLD-MCNC: 15.5 G/DL (ref 13–17.7)
IMM GRANULOCYTES # BLD: 0 X10E3/UL (ref 0–0.1)
IMM GRANULOCYTES NFR BLD: 0 %
LDLC SERPL CALC-MCNC: 82 MG/DL (ref 0–99)
LYMPHOCYTES # BLD AUTO: 1.7 X10E3/UL (ref 0.7–3.1)
LYMPHOCYTES NFR BLD AUTO: 29 %
MCH RBC QN AUTO: 31.9 PG (ref 26.6–33)
MCHC RBC AUTO-ENTMCNC: 35.1 G/DL (ref 31.5–35.7)
MCV RBC AUTO: 91 FL (ref 79–97)
MONOCYTES # BLD AUTO: 0.6 X10E3/UL (ref 0.1–0.9)
MONOCYTES NFR BLD AUTO: 11 %
NEUTROPHILS # BLD AUTO: 3.3 X10E3/UL (ref 1.4–7)
NEUTROPHILS NFR BLD AUTO: 56 %
PLATELET # BLD AUTO: 249 X10E3/UL (ref 150–450)
POTASSIUM SERPL-SCNC: 4.6 MMOL/L (ref 3.5–5.2)
PROT SERPL-MCNC: 7.1 G/DL (ref 6–8.5)
RBC # BLD AUTO: 4.86 X10E6/UL (ref 4.14–5.8)
SL AMB EGFR AFRICAN AMERICAN: 71 ML/MIN/1.73
SL AMB EGFR NON AFRICAN AMERICAN: 61 ML/MIN/1.73
SL AMB VLDL CHOLESTEROL CALC: 27 MG/DL (ref 5–40)
SODIUM SERPL-SCNC: 138 MMOL/L (ref 134–144)
TRIGL SERPL-MCNC: 158 MG/DL (ref 0–149)
WBC # BLD AUTO: 5.8 X10E3/UL (ref 3.4–10.8)

## 2021-12-03 PROCEDURE — 3051F HG A1C>EQUAL 7.0%<8.0%: CPT | Performed by: NURSE PRACTITIONER

## 2021-12-08 ENCOUNTER — OFFICE VISIT (OUTPATIENT)
Dept: ENDOCRINOLOGY | Facility: HOSPITAL | Age: 56
End: 2021-12-08
Payer: COMMERCIAL

## 2021-12-08 VITALS
BODY MASS INDEX: 27.52 KG/M2 | HEART RATE: 98 BPM | WEIGHT: 181.6 LBS | DIASTOLIC BLOOD PRESSURE: 74 MMHG | HEIGHT: 68 IN | SYSTOLIC BLOOD PRESSURE: 124 MMHG

## 2021-12-08 DIAGNOSIS — E11.69 HYPERLIPIDEMIA ASSOCIATED WITH TYPE 2 DIABETES MELLITUS (HCC): ICD-10-CM

## 2021-12-08 DIAGNOSIS — E11.319 TYPE 2 DIABETES MELLITUS WITH RETINOPATHY, WITH LONG-TERM CURRENT USE OF INSULIN, MACULAR EDEMA PRESENCE UNSPECIFIED, UNSPECIFIED LATERALITY, UNSPECIFIED RETINOPATHY SEVERITY (HCC): Primary | ICD-10-CM

## 2021-12-08 DIAGNOSIS — Z79.4 TYPE 2 DIABETES MELLITUS WITH RETINOPATHY, WITH LONG-TERM CURRENT USE OF INSULIN, MACULAR EDEMA PRESENCE UNSPECIFIED, UNSPECIFIED LATERALITY, UNSPECIFIED RETINOPATHY SEVERITY (HCC): Primary | ICD-10-CM

## 2021-12-08 DIAGNOSIS — E78.5 HYPERLIPIDEMIA ASSOCIATED WITH TYPE 2 DIABETES MELLITUS (HCC): ICD-10-CM

## 2021-12-08 DIAGNOSIS — E11.3293 MILD NONPROLIFERATIVE DIABETIC RETINOPATHY OF BOTH EYES WITHOUT MACULAR EDEMA ASSOCIATED WITH TYPE 2 DIABETES MELLITUS (HCC): ICD-10-CM

## 2021-12-08 DIAGNOSIS — I10 ESSENTIAL HYPERTENSION: ICD-10-CM

## 2021-12-08 PROCEDURE — 3074F SYST BP LT 130 MM HG: CPT | Performed by: NURSE PRACTITIONER

## 2021-12-08 PROCEDURE — 1036F TOBACCO NON-USER: CPT | Performed by: NURSE PRACTITIONER

## 2021-12-08 PROCEDURE — 3078F DIAST BP <80 MM HG: CPT | Performed by: NURSE PRACTITIONER

## 2021-12-08 PROCEDURE — 99214 OFFICE O/P EST MOD 30 MIN: CPT | Performed by: NURSE PRACTITIONER

## 2021-12-08 PROCEDURE — 95251 CONT GLUC MNTR ANALYSIS I&R: CPT | Performed by: NURSE PRACTITIONER

## 2021-12-08 PROCEDURE — 3008F BODY MASS INDEX DOCD: CPT | Performed by: NURSE PRACTITIONER

## 2022-03-04 LAB
ALBUMIN SERPL-MCNC: 4.5 G/DL (ref 3.8–4.9)
ALBUMIN/GLOB SERPL: 1.8 {RATIO} (ref 1.2–2.2)
ALP SERPL-CCNC: 69 IU/L (ref 44–121)
ALT SERPL-CCNC: 30 IU/L (ref 0–44)
AST SERPL-CCNC: 28 IU/L (ref 0–40)
BASOPHILS # BLD AUTO: 0.1 X10E3/UL (ref 0–0.2)
BASOPHILS NFR BLD AUTO: 1 %
BILIRUB SERPL-MCNC: 0.3 MG/DL (ref 0–1.2)
BUN SERPL-MCNC: 15 MG/DL (ref 6–24)
BUN/CREAT SERPL: 13 (ref 9–20)
CALCIUM SERPL-MCNC: 10.3 MG/DL (ref 8.7–10.2)
CHLORIDE SERPL-SCNC: 98 MMOL/L (ref 96–106)
CO2 SERPL-SCNC: 25 MMOL/L (ref 20–29)
CREAT SERPL-MCNC: 1.16 MG/DL (ref 0.76–1.27)
EGFR: 73 ML/MIN/1.73
EOSINOPHIL # BLD AUTO: 0.2 X10E3/UL (ref 0–0.4)
EOSINOPHIL NFR BLD AUTO: 3 %
ERYTHROCYTE [DISTWIDTH] IN BLOOD BY AUTOMATED COUNT: 12.8 % (ref 11.6–15.4)
EST. AVERAGE GLUCOSE BLD GHB EST-MCNC: 163 MG/DL
GLOBULIN SER-MCNC: 2.5 G/DL (ref 1.5–4.5)
GLUCOSE SERPL-MCNC: 121 MG/DL (ref 65–99)
HBA1C MFR BLD: 7.3 % (ref 4.8–5.6)
HCT VFR BLD AUTO: 46.6 % (ref 37.5–51)
HGB BLD-MCNC: 15.4 G/DL (ref 13–17.7)
IMM GRANULOCYTES # BLD: 0 X10E3/UL (ref 0–0.1)
IMM GRANULOCYTES NFR BLD: 0 %
LYMPHOCYTES # BLD AUTO: 1.7 X10E3/UL (ref 0.7–3.1)
LYMPHOCYTES NFR BLD AUTO: 29 %
MCH RBC QN AUTO: 29.6 PG (ref 26.6–33)
MCHC RBC AUTO-ENTMCNC: 33 G/DL (ref 31.5–35.7)
MCV RBC AUTO: 90 FL (ref 79–97)
MONOCYTES # BLD AUTO: 0.6 X10E3/UL (ref 0.1–0.9)
MONOCYTES NFR BLD AUTO: 10 %
NEUTROPHILS # BLD AUTO: 3.3 X10E3/UL (ref 1.4–7)
NEUTROPHILS NFR BLD AUTO: 57 %
PLATELET # BLD AUTO: 268 X10E3/UL (ref 150–450)
POTASSIUM SERPL-SCNC: 4.6 MMOL/L (ref 3.5–5.2)
PROT SERPL-MCNC: 7 G/DL (ref 6–8.5)
RBC # BLD AUTO: 5.2 X10E6/UL (ref 4.14–5.8)
SODIUM SERPL-SCNC: 137 MMOL/L (ref 134–144)
TSH SERPL DL<=0.005 MIU/L-ACNC: 2.31 UIU/ML (ref 0.45–4.5)
WBC # BLD AUTO: 5.8 X10E3/UL (ref 3.4–10.8)

## 2022-03-09 ENCOUNTER — OFFICE VISIT (OUTPATIENT)
Dept: ENDOCRINOLOGY | Facility: HOSPITAL | Age: 57
End: 2022-03-09
Payer: COMMERCIAL

## 2022-03-09 VITALS
HEIGHT: 68 IN | DIASTOLIC BLOOD PRESSURE: 80 MMHG | BODY MASS INDEX: 27.31 KG/M2 | HEART RATE: 94 BPM | WEIGHT: 180.2 LBS | SYSTOLIC BLOOD PRESSURE: 126 MMHG

## 2022-03-09 DIAGNOSIS — E11.3293 MILD NONPROLIFERATIVE DIABETIC RETINOPATHY OF BOTH EYES WITHOUT MACULAR EDEMA ASSOCIATED WITH TYPE 2 DIABETES MELLITUS (HCC): ICD-10-CM

## 2022-03-09 DIAGNOSIS — E11.69 HYPERLIPIDEMIA ASSOCIATED WITH TYPE 2 DIABETES MELLITUS (HCC): ICD-10-CM

## 2022-03-09 DIAGNOSIS — E11.319 TYPE 2 DIABETES MELLITUS WITH RETINOPATHY, WITH LONG-TERM CURRENT USE OF INSULIN, MACULAR EDEMA PRESENCE UNSPECIFIED, UNSPECIFIED LATERALITY, UNSPECIFIED RETINOPATHY SEVERITY (HCC): Primary | ICD-10-CM

## 2022-03-09 DIAGNOSIS — I10 ESSENTIAL HYPERTENSION: ICD-10-CM

## 2022-03-09 DIAGNOSIS — E78.5 HYPERLIPIDEMIA ASSOCIATED WITH TYPE 2 DIABETES MELLITUS (HCC): ICD-10-CM

## 2022-03-09 DIAGNOSIS — Z79.4 TYPE 2 DIABETES MELLITUS WITH RETINOPATHY, WITH LONG-TERM CURRENT USE OF INSULIN, MACULAR EDEMA PRESENCE UNSPECIFIED, UNSPECIFIED LATERALITY, UNSPECIFIED RETINOPATHY SEVERITY (HCC): Primary | ICD-10-CM

## 2022-03-09 PROCEDURE — 95251 CONT GLUC MNTR ANALYSIS I&R: CPT | Performed by: NURSE PRACTITIONER

## 2022-03-09 PROCEDURE — 99214 OFFICE O/P EST MOD 30 MIN: CPT | Performed by: NURSE PRACTITIONER

## 2022-03-09 RX ORDER — INSULIN GLARGINE 100 [IU]/ML
INJECTION, SOLUTION SUBCUTANEOUS
Qty: 30 ML | Refills: 11 | Status: SHIPPED | OUTPATIENT
Start: 2022-03-09

## 2022-03-09 RX ORDER — FLASH GLUCOSE SENSOR
KIT MISCELLANEOUS
Qty: 2 EACH | Refills: 11 | Status: SHIPPED | OUTPATIENT
Start: 2022-03-09

## 2022-03-09 NOTE — PROGRESS NOTES
Eloy Agarwal 59 y o  male MRN: [id number]    Encounter: [id number]      Assessment/Plan     Assessment: This is a 59y o -year-old male with type 2 diabetes with retinopathy and long-term insulin use, hypertension and hyperlipidemia  Plan:  1   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: His most recent hemoglobin A1c is slightly improved to 7 3  However, review of his Freestyle Adry download report reveals that he is relatively well controlled throughout the day with some consistent hyperglycemia following dinner   For now, I have asked him to continue Levemir current doses  Insulin to carbohydrate ranges will remain the same at breakfast and lunch but adjust to 1:8 at dinner  He will continue to utilize the freestyle Boody and for the report to the office in 2 weeks for review and further adjustment, if necessary   Check hemoglobin A1c prior to next visit         2   Hypertension: Simi Peres is normotensive in the office today   Continue lisinopril   Check comprehensive metabolic panel prior to next visit      3   Hyperlipidemia:  Continue atorvastatin   Check fasting lipid panel prior to next visit  CC: Type 2 Diabetes follow-up     History of Present Illness     HPI:  60 y  o  year old male with type 2 diabetes for 19 years   He is on insulin at home and takes Levemir 32 units in the evening and 44 units in the morning with NovoLog 1 unit for every 10 g of carbs with breakfast, lunch, dinner and snack   Her most recent hemoglobin A1c from March 3, 2022 is 7 3   He states that his blood pressures have been high at times throughout the fall as he has been traveling for work and has been eating out more  He denies any recent episodes of hypoglycemia, polyuria, polydipsia, nocturia and blurry vision   He denies neuropathy but does admit to neuropathy and retinopathy       He obtained his annual diabetic eye exam in Spring 2021, by his report   He has mild retinopathy  He saw his retina specialist in February 2022  He is getting injections to treat retinopathy  He complains of some discomfort to his feet intermittently but does not follow podiatry for regular diabetic foot care   Diabetic foot exam was performed at last office visit on September 21, 2021        Blood Sugar/Glucometer/Pump/CGM review: Qi David of his personal freestyle jeremy from 424 through March 9, 2022 reveals relatively well controlled blood sugars throughout the day with some variability and hyperglycemia after dinner and overnight   His average glucose was 159 with a standard deviation of 0 2   He is in target range 67 % of the time with 32 % high and less than 1% low      For his hypertension, he is treated with lisinopril 10 mg daily   He states his compliance with his lisinopril has improved      His hyperlipidemia is treated with atorvastatin 40 mg daily       Review of Systems   Constitutional: Negative  Negative for chills, fatigue and fever  HENT: Positive for hearing loss  Negative for trouble swallowing and voice change  Eyes: Negative for photophobia, pain, discharge, redness, itching and visual disturbance  Respiratory: Negative for cough and shortness of breath  Cardiovascular: Negative for chest pain and palpitations  Gastrointestinal: Positive for abdominal pain (associated with GERD)  Negative for constipation, diarrhea, nausea and vomiting  Endocrine: Negative for cold intolerance, heat intolerance, polydipsia, polyphagia and polyuria  Genitourinary: Negative  Musculoskeletal: Positive for back pain (Chronic)  Skin: Negative  Allergic/Immunologic: Negative  Neurological: Negative for dizziness, syncope, light-headedness and headaches  Hematological: Negative  Psychiatric/Behavioral: Negative  All other systems reviewed and are negative  Historical Information   No past medical history on file    Past Surgical History:   Procedure Laterality Date    NO PAST SURGERIES       Social History   Social History     Substance and Sexual Activity   Alcohol Use Not Currently     Social History     Substance and Sexual Activity   Drug Use No     Social History     Tobacco Use   Smoking Status Former Smoker    Packs/day: 1 00    Years: 20 00    Pack years: 20 00    Types: Cigarettes    Quit date: 2008    Years since quittin 9   Smokeless Tobacco Never Used     Family History:   Family History   Adopted: Yes   Family history unknown: Yes       Meds/Allergies   Current Outpatient Medications   Medication Sig Dispense Refill    ascorbic acid (VITAMIN C) 500 mg tablet Take 500 mg by mouth daily      aspirin (ECOTRIN LOW STRENGTH) 81 mg EC tablet Take 81 mg by mouth daily      atorvastatin (LIPITOR) 40 mg tablet TAKE ONE TABLET BY MOUTH EVERY DAY 30 tablet 11    cholecalciferol (VITAMIN D3) 1,000 units tablet Take 1,000 Units by mouth daily      Continuous Blood Gluc Sensor (FreeStyle Adry 14 Day Sensor) MISC q 14 days 2 each 11    imipramine (TOFRANIL) 50 mg tablet TAKE ONE TABLET BY MOUTH EVERY DAY 30 tablet 11    insulin aspart (NovoLOG) 100 Units/mL injection pen Up to 75 units daily  30 mL 6    insulin glargine (Lantus SoloStar) 100 units/mL injection pen INJECT 40 UNITS UNDER THE SKIN IN THE MORNING AND 32 UNITS IN THE EVENING 30 mL 11    Insulin Pen Needle (B-D UF III MINI PEN NEEDLES) 31G X 5 MM MISC Use 5 needles daily 500 each 3    lisinopril (ZESTRIL) 10 mg tablet TAKE ONE TABLET BY MOUTH EVERY DAY 30 tablet 11     No current facility-administered medications for this visit  No Known Allergies    Objective   Vitals: Blood pressure 126/80, pulse 94, height 5' 8" (1 727 m), weight 81 7 kg (180 lb 3 2 oz)  Physical Exam  Vitals reviewed  Constitutional:       Appearance: He is well-developed  HENT:      Head: Normocephalic and atraumatic        Nose: Nose normal    Eyes:      Conjunctiva/sclera: Conjunctivae normal       Pupils: Pupils are equal, round, and reactive to light  Comments: Wears glasses   Cardiovascular:      Rate and Rhythm: Normal rate and regular rhythm  Heart sounds: Normal heart sounds  Pulmonary:      Effort: Pulmonary effort is normal       Breath sounds: Normal breath sounds  Abdominal:      General: Bowel sounds are normal       Palpations: Abdomen is soft  Musculoskeletal:         General: Normal range of motion  Cervical back: Normal range of motion and neck supple  Skin:     General: Skin is warm and dry  Neurological:      Mental Status: He is alert and oriented to person, place, and time  Psychiatric:         Behavior: Behavior normal          Thought Content:  Thought content normal          Judgment: Judgment normal        Lab Results:   Lab Results   Component Value Date/Time    Hemoglobin A1C 7 3 (H) 03/03/2022 10:28 AM    Hemoglobin A1C 7 6 (H) 12/02/2021 10:04 AM    Hemoglobin A1C 7 3 (H) 09/01/2021 08:50 AM    White Blood Cell Count 5 8 03/03/2022 10:28 AM    White Blood Cell Count 5 8 12/02/2021 10:04 AM    White Blood Cell Count 6 2 04/20/2021 10:01 AM    Hemoglobin 15 4 03/03/2022 10:28 AM    Hemoglobin 15 5 12/02/2021 10:04 AM    Hemoglobin 14 8 04/20/2021 10:01 AM    HCT 46 6 03/03/2022 10:28 AM    HCT 44 2 12/02/2021 10:04 AM    HCT 44 3 04/20/2021 10:01 AM    MCV 90 03/03/2022 10:28 AM    MCV 91 12/02/2021 10:04 AM    MCV 88 04/20/2021 10:01 AM    Platelet Count 596 10/68/5903 10:28 AM    Platelet Count 225 40/92/2744 10:04 AM    Platelet Count 512 46/41/6482 10:01 AM    BUN 15 03/03/2022 10:28 AM    BUN 13 12/02/2021 10:04 AM    BUN 15 09/01/2021 08:50 AM    Potassium 4 6 03/03/2022 10:28 AM    Potassium 4 6 12/02/2021 10:04 AM    Potassium 4 6 09/01/2021 08:50 AM    Chloride 98 03/03/2022 10:28 AM    Chloride 100 12/02/2021 10:04 AM    Chloride 100 09/01/2021 08:50 AM    CO2 25 03/03/2022 10:28 AM    CO2 26 12/02/2021 10:04 AM    CO2 25 09/01/2021 08:50 AM    Creatinine 1 16 03/03/2022 10:28 AM Creatinine 1 28 (H) 12/02/2021 10:04 AM    Creatinine 1 18 09/01/2021 08:50 AM    AST 28 03/03/2022 10:28 AM    AST 31 12/02/2021 10:04 AM    AST 23 09/01/2021 08:50 AM    ALT 30 03/03/2022 10:28 AM    ALT 43 12/02/2021 10:04 AM    ALT 29 09/01/2021 08:50 AM    Albumin 4 5 03/03/2022 10:28 AM    Albumin 4 6 12/02/2021 10:04 AM    Albumin 4 5 09/01/2021 08:50 AM    Globulin, Total 2 5 03/03/2022 10:28 AM    Globulin, Total 2 5 12/02/2021 10:04 AM    Globulin, Total 2 2 09/01/2021 08:50 AM    HDL 38 (L) 12/02/2021 10:04 AM    HDL 37 (L) 04/20/2021 10:01 AM    Triglycerides 158 (H) 12/02/2021 10:04 AM    Triglycerides 198 (H) 04/20/2021 10:01 AM       Portions of the record may have been created with voice recognition software  Occasional wrong word or "sound a like" substitutions may have occurred due to the inherent limitations of voice recognition software  Read the chart carefully and recognize, using context, where substitutions have occurred

## 2022-03-09 NOTE — PATIENT INSTRUCTIONS
Be mindful of diet       Exercise regularly and stay hydrated       Continue NovoLog at current dose at breakfast and lunch  Adjust insulin to carbohydrate ratio at dinner to 1:8     DO NOT TAKE NOVOLOG OVERNIGHT TO TREAT HIGH BLOOD SUGAR !!!     Continue morning Levemir 44 units and 32 units in the evening      Make sure you are injecting insulin consistently      Continue to utilize the freestyle jeremy      Please come by the office in approximately 2-3 weeks for download of your freestyle jeremy      Contact the office with any consistent hypoglycemia      Continue lisinopril  - consistently       Continue atorvastatin      Obtain lab work prior to next visit      Continue to follow up with the retina specialist   Please ask your ophthalmologist to send us a report to our office

## 2022-06-11 LAB
ALBUMIN SERPL-MCNC: 4.4 G/DL (ref 3.8–4.9)
ALBUMIN/GLOB SERPL: 1.9 {RATIO} (ref 1.2–2.2)
ALP SERPL-CCNC: 63 IU/L (ref 44–121)
ALT SERPL-CCNC: 25 IU/L (ref 0–44)
AST SERPL-CCNC: 21 IU/L (ref 0–40)
BASOPHILS # BLD AUTO: 0.1 X10E3/UL (ref 0–0.2)
BASOPHILS NFR BLD AUTO: 1 %
BILIRUB SERPL-MCNC: 0.3 MG/DL (ref 0–1.2)
BUN SERPL-MCNC: 13 MG/DL (ref 6–24)
BUN/CREAT SERPL: 12 (ref 9–20)
CALCIUM SERPL-MCNC: 9 MG/DL (ref 8.7–10.2)
CHLORIDE SERPL-SCNC: 104 MMOL/L (ref 96–106)
CHOLEST SERPL-MCNC: 154 MG/DL (ref 100–199)
CHOLEST/HDLC SERPL: 4.4 RATIO (ref 0–5)
CO2 SERPL-SCNC: 25 MMOL/L (ref 20–29)
CREAT SERPL-MCNC: 1.08 MG/DL (ref 0.76–1.27)
EGFR: 79 ML/MIN/1.73
EOSINOPHIL # BLD AUTO: 0.2 X10E3/UL (ref 0–0.4)
EOSINOPHIL NFR BLD AUTO: 4 %
ERYTHROCYTE [DISTWIDTH] IN BLOOD BY AUTOMATED COUNT: 13.1 % (ref 11.6–15.4)
EST. AVERAGE GLUCOSE BLD GHB EST-MCNC: 171 MG/DL
GLOBULIN SER-MCNC: 2.3 G/DL (ref 1.5–4.5)
GLUCOSE SERPL-MCNC: 115 MG/DL (ref 65–99)
HBA1C MFR BLD: 7.6 % (ref 4.8–5.6)
HCT VFR BLD AUTO: 42.2 % (ref 37.5–51)
HDLC SERPL-MCNC: 35 MG/DL
HGB BLD-MCNC: 14.3 G/DL (ref 13–17.7)
IMM GRANULOCYTES # BLD: 0 X10E3/UL (ref 0–0.1)
IMM GRANULOCYTES NFR BLD: 0 %
LDLC SERPL CALC-MCNC: 96 MG/DL (ref 0–99)
LYMPHOCYTES # BLD AUTO: 1.4 X10E3/UL (ref 0.7–3.1)
LYMPHOCYTES NFR BLD AUTO: 30 %
MCH RBC QN AUTO: 30.4 PG (ref 26.6–33)
MCHC RBC AUTO-ENTMCNC: 33.9 G/DL (ref 31.5–35.7)
MCV RBC AUTO: 90 FL (ref 79–97)
MONOCYTES # BLD AUTO: 0.5 X10E3/UL (ref 0.1–0.9)
MONOCYTES NFR BLD AUTO: 11 %
NEUTROPHILS # BLD AUTO: 2.6 X10E3/UL (ref 1.4–7)
NEUTROPHILS NFR BLD AUTO: 54 %
PLATELET # BLD AUTO: 236 X10E3/UL (ref 150–450)
POTASSIUM SERPL-SCNC: 4.4 MMOL/L (ref 3.5–5.2)
PROT SERPL-MCNC: 6.7 G/DL (ref 6–8.5)
RBC # BLD AUTO: 4.7 X10E6/UL (ref 4.14–5.8)
SL AMB VLDL CHOLESTEROL CALC: 23 MG/DL (ref 5–40)
SODIUM SERPL-SCNC: 141 MMOL/L (ref 134–144)
TRIGL SERPL-MCNC: 126 MG/DL (ref 0–149)
WBC # BLD AUTO: 4.7 X10E3/UL (ref 3.4–10.8)

## 2022-06-15 ENCOUNTER — TELEPHONE (OUTPATIENT)
Dept: FAMILY MEDICINE CLINIC | Facility: CLINIC | Age: 57
End: 2022-06-15

## 2022-06-15 ENCOUNTER — OFFICE VISIT (OUTPATIENT)
Dept: ENDOCRINOLOGY | Facility: HOSPITAL | Age: 57
End: 2022-06-15
Payer: COMMERCIAL

## 2022-06-15 VITALS
SYSTOLIC BLOOD PRESSURE: 136 MMHG | HEART RATE: 95 BPM | DIASTOLIC BLOOD PRESSURE: 70 MMHG | OXYGEN SATURATION: 98 % | HEIGHT: 68 IN | BODY MASS INDEX: 27.37 KG/M2 | WEIGHT: 180.6 LBS

## 2022-06-15 DIAGNOSIS — I10 ESSENTIAL HYPERTENSION: ICD-10-CM

## 2022-06-15 DIAGNOSIS — E11.319 TYPE 2 DIABETES MELLITUS WITH RETINOPATHY, WITH LONG-TERM CURRENT USE OF INSULIN, MACULAR EDEMA PRESENCE UNSPECIFIED, UNSPECIFIED LATERALITY, UNSPECIFIED RETINOPATHY SEVERITY (HCC): ICD-10-CM

## 2022-06-15 DIAGNOSIS — Z79.4 TYPE 2 DIABETES MELLITUS WITH RETINOPATHY, WITH LONG-TERM CURRENT USE OF INSULIN, MACULAR EDEMA PRESENCE UNSPECIFIED, UNSPECIFIED LATERALITY, UNSPECIFIED RETINOPATHY SEVERITY (HCC): ICD-10-CM

## 2022-06-15 DIAGNOSIS — E78.5 HYPERLIPIDEMIA, UNSPECIFIED HYPERLIPIDEMIA TYPE: ICD-10-CM

## 2022-06-15 PROCEDURE — 99214 OFFICE O/P EST MOD 30 MIN: CPT | Performed by: INTERNAL MEDICINE

## 2022-06-15 RX ORDER — PEN NEEDLE, DIABETIC 31 GX5/16"
NEEDLE, DISPOSABLE MISCELLANEOUS
Qty: 500 EACH | Refills: 3 | Status: SHIPPED | OUTPATIENT
Start: 2022-06-15

## 2022-06-15 RX ORDER — ATORVASTATIN CALCIUM 40 MG/1
40 TABLET, FILM COATED ORAL DAILY
Qty: 30 TABLET | Refills: 11 | Status: SHIPPED | OUTPATIENT
Start: 2022-06-15

## 2022-06-15 RX ORDER — LISINOPRIL 10 MG/1
10 TABLET ORAL DAILY
Qty: 30 TABLET | Refills: 11 | Status: SHIPPED | OUTPATIENT
Start: 2022-06-15

## 2022-06-15 NOTE — TELEPHONE ENCOUNTER
Confirmation Tobi Bermudez [id number]  Effective: 06/15/2022     Expires: 2022  Active  Referred From  [facility]  Group DDS: [id number]  Provider CU: [id number]  Tax G  [address] [address]  Referred To  [facility] [facility]  Specialty: Not Available  Tier 2  Group WWC: [id number]  Provider HB: [id number]  Tax KD: [id number]  This referral is valid at any location for the above group    Patient Info  Madan Warner  [id number]  Male  1965  [facility]  Clinical Information  Place of Service  Office  Service Type  Medical Care  Diagnoses  6 P28 29 - other general symptoms and signs  Procedures  None entered      Disclaimer  [facility] and [facility] Affiliates ([facility]) will pay for only those services covered under the [place] [address] which are specifically noted and requested by the PCP or OBGYN on the referral  If any additional services, testing, or follow-up care are required, the PCP or OBGYN must be contacted prior to the delivery of such additional services for written approval on a separate referral  Non-referred services will not be covered by [facility]  Benefits are underwritten or administered by [employer], a subsidiary of [employer], which are independent licensees of the [employer] and [employer]

## 2022-06-15 NOTE — PROGRESS NOTES
6/15/2022    Assessment/Plan      Diagnoses and all orders for this visit:    Type 2 diabetes mellitus with retinopathy, with long-term current use of insulin, macular edema presence unspecified, unspecified laterality, unspecified retinopathy severity (HCC)  -     Insulin Pen Needle (B-D UF III MINI PEN NEEDLES) 31G X 5 MM MISC; Use 5 needles daily  -     Hemoglobin A1C; Future  -     Comprehensive metabolic panel; Future  -     CBC and differential; Future  -     Microalbumin / creatinine urine ratio; Future  -     TSH, 3rd generation; Future  -     Hemoglobin A1C  -     Comprehensive metabolic panel  -     CBC and differential  -     Microalbumin / creatinine urine ratio  -     TSH, 3rd generation    Hyperlipidemia, unspecified hyperlipidemia type  -     atorvastatin (LIPITOR) 40 mg tablet; Take 1 tablet (40 mg total) by mouth daily 1 tab daily  -     Lipid Panel with Direct LDL reflex; Future  -     Lipid Panel with Direct LDL reflex    Essential hypertension  -     lisinopril (ZESTRIL) 10 mg tablet; Take 1 tablet (10 mg total) by mouth daily 1 tab daily        Assessment/Plan:  1  Type 2 diabetes:  A1c did trend up slightly but CGM data reveals time in range, variability, time below range metrics are all at goal   He has been traveling more lately which may be affecting blood sugar control  Therefore we will continue current regimen and see him back in 3-4 months with another set of labs just prior  2  Hypertension: Continue lisinopril  3  Hyperlipidemia: Continue atorvastatin  CC: Diabetes follow-up    History of Present Illness     HPI: Petra Sharpe is a 59y o  year old male with type 2 diabetes for about 20 years  He is on insulin at home and takes Levemir 32 units in the evening and 44 units in the morning as well as NovoLog 1 unit for every 10 g of carbs at breakfast, lunch, snack and 1:8 for dinner  He denies any polyuria, polydipsia, nocturia and blurry vision    He denies neuropathy but does admit to nephropathy and retinopathy  Hypoglycemic episodes: No not frequently  Encouraged him to have vasectomy carbohydrates your vital times        The patient's last eye exam was in 2022 with mild NDR and macular edema  The patient's last foot exam was in 2021  Blood Sugar/Glucometer/Pump/CGM review:  Adry download from  through 6/15 shows 99% of the time the CGM is active  Average glucose was 754  Glucose management indicator was 7%  Glucose variability was 27 1%  74% at time within range  0% low  Occasional hyperglycemia typically postprandially  Rare lower blood sugars  He has history of hypertension and is treated with lisinopril 10 mg daily  For hyperlipidemia continues on atorvastatin 40 mg daily  Review of Systems   Constitutional: Negative for fatigue  HENT: Negative for trouble swallowing and voice change  Eyes: Negative for visual disturbance  Respiratory: Negative for shortness of breath  Cardiovascular: Negative for palpitations and leg swelling  Gastrointestinal: Negative for abdominal pain, nausea and vomiting  Endocrine: Negative for polydipsia and polyuria  Musculoskeletal: Negative for arthralgias and myalgias  Skin: Negative for rash  Neurological: Negative for dizziness, tremors and weakness  Hematological: Negative for adenopathy  Psychiatric/Behavioral: Negative for agitation and confusion  Historical Information   History reviewed  No pertinent past medical history    Past Surgical History:   Procedure Laterality Date    NO PAST SURGERIES       Social History   Social History     Substance and Sexual Activity   Alcohol Use Not Currently     Social History     Substance and Sexual Activity   Drug Use No     Social History     Tobacco Use   Smoking Status Former Smoker    Packs/day: 1 00    Years: 20 00    Pack years: 20 00    Types: Cigarettes    Quit date: 2008    Years since quittin 2   Smokeless Tobacco Never Used     Family History:   Family History   Adopted: Yes   Family history unknown: Yes       Meds/Allergies   Current Outpatient Medications   Medication Sig Dispense Refill    ascorbic acid (VITAMIN C) 500 mg tablet Take 500 mg by mouth daily      aspirin (ECOTRIN LOW STRENGTH) 81 mg EC tablet Take 81 mg by mouth daily      atorvastatin (LIPITOR) 40 mg tablet Take 1 tablet (40 mg total) by mouth daily 1 tab daily 30 tablet 11    cholecalciferol (VITAMIN D3) 1,000 units tablet Take 1,000 Units by mouth daily      Continuous Blood Gluc Sensor (FreeStyle Adry 14 Day Sensor) MISC q 14 days 2 each 11    imipramine (TOFRANIL) 50 mg tablet TAKE ONE TABLET BY MOUTH EVERY DAY 30 tablet 11    insulin aspart (NovoLOG) 100 Units/mL injection pen Up to 75 units daily  30 mL 6    insulin glargine (Lantus SoloStar) 100 units/mL injection pen INJECT 44 UNITS UNDER THE SKIN IN THE MORNING AND 32 UNITS IN THE EVENING 30 mL 11    Insulin Pen Needle (B-D UF III MINI PEN NEEDLES) 31G X 5 MM MISC Use 5 needles daily 500 each 3    lisinopril (ZESTRIL) 10 mg tablet Take 1 tablet (10 mg total) by mouth daily 1 tab daily 30 tablet 11     No current facility-administered medications for this visit  No Known Allergies    Objective   Vitals: Blood pressure 136/70, pulse 95, height 5' 8" (1 727 m), weight 81 9 kg (180 lb 9 6 oz), SpO2 98 %  Invasive Devices  Report    None                 Physical Exam  Constitutional:       General: He is not in acute distress  Appearance: He is well-developed  He is not diaphoretic  HENT:      Head: Normocephalic and atraumatic  Eyes:      Conjunctiva/sclera: Conjunctivae normal       Pupils: Pupils are equal, round, and reactive to light  Neck:      Thyroid: No thyromegaly  Cardiovascular:      Rate and Rhythm: Normal rate and regular rhythm  Pulmonary:      Effort: Pulmonary effort is normal  No respiratory distress  Breath sounds: Normal breath sounds  Abdominal:      General: Bowel sounds are normal       Palpations: Abdomen is soft  Musculoskeletal:         General: Normal range of motion  Cervical back: Normal range of motion and neck supple  Skin:     General: Skin is warm and dry  Findings: No rash  Neurological:      Mental Status: He is alert and oriented to person, place, and time  Motor: No abnormal muscle tone  Psychiatric:         Behavior: Behavior normal          The history was obtained from the review of the chart and from the patient  Lab Results:    Most recent Alc is  Lab Results   Component Value Date    HGBA1C 7 6 (H) 06/10/2022           No components found for: HA1C  No components found for: GLU    Lab Results   Component Value Date    CREATININE 1 08 06/10/2022    CREATININE 1 16 03/03/2022    CREATININE 1 28 (H) 12/02/2021    BUN 13 06/10/2022     (L) 10/10/2017    K 4 4 06/10/2022     06/10/2022    CO2 25 06/10/2022     eGFR   Date Value Ref Range Status   06/10/2022 79 >59 mL/min/1 73 Final     No components found for: [facility]    Lab Results   Component Value Date    HDL 35 (L) 06/10/2022    TRIG 126 06/10/2022    CHOLHDL 4 4 06/10/2022       Lab Results   Component Value Date    ALT 25 06/10/2022    AST 21 06/10/2022       Lab Results   Component Value Date    TSH 2 310 03/03/2022           No future appointments  Portions of the record may have been created with voice recognition software  Occasional wrong word or "sound a like" substitutions may have occurred due to the inherent limitations of voice recognition software  Read the chart carefully and recognize, using context, where substitutions have occurred

## 2022-09-10 LAB
ALBUMIN SERPL-MCNC: 4.7 G/DL (ref 3.8–4.9)
ALBUMIN/CREAT UR: <5 MG/G CREAT (ref 0–29)
ALBUMIN/GLOB SERPL: 2.4 {RATIO} (ref 1.2–2.2)
ALP SERPL-CCNC: 66 IU/L (ref 44–121)
ALT SERPL-CCNC: 31 IU/L (ref 0–44)
AST SERPL-CCNC: 25 IU/L (ref 0–40)
BASOPHILS # BLD AUTO: 0 X10E3/UL (ref 0–0.2)
BASOPHILS NFR BLD AUTO: 1 %
BILIRUB SERPL-MCNC: 0.4 MG/DL (ref 0–1.2)
BUN SERPL-MCNC: 19 MG/DL (ref 6–24)
BUN/CREAT SERPL: 18 (ref 9–20)
CALCIUM SERPL-MCNC: 9.4 MG/DL (ref 8.7–10.2)
CHLORIDE SERPL-SCNC: 102 MMOL/L (ref 96–106)
CHOLEST SERPL-MCNC: 155 MG/DL (ref 100–199)
CO2 SERPL-SCNC: 25 MMOL/L (ref 20–29)
CREAT SERPL-MCNC: 1.07 MG/DL (ref 0.76–1.27)
CREAT UR-MCNC: 65.6 MG/DL
EGFR: 80 ML/MIN/1.73
EOSINOPHIL # BLD AUTO: 0.2 X10E3/UL (ref 0–0.4)
EOSINOPHIL NFR BLD AUTO: 3 %
ERYTHROCYTE [DISTWIDTH] IN BLOOD BY AUTOMATED COUNT: 13 % (ref 11.6–15.4)
EST. AVERAGE GLUCOSE BLD GHB EST-MCNC: 171 MG/DL
GLOBULIN SER-MCNC: 2 G/DL (ref 1.5–4.5)
GLUCOSE SERPL-MCNC: 132 MG/DL (ref 65–99)
HBA1C MFR BLD: 7.6 % (ref 4.8–5.6)
HCT VFR BLD AUTO: 43.8 % (ref 37.5–51)
HDLC SERPL-MCNC: 42 MG/DL
HGB BLD-MCNC: 15 G/DL (ref 13–17.7)
IMM GRANULOCYTES # BLD: 0 X10E3/UL (ref 0–0.1)
IMM GRANULOCYTES NFR BLD: 0 %
LDLC SERPL CALC-MCNC: 92 MG/DL (ref 0–99)
LDLC/HDLC SERPL: 2.2 RATIO (ref 0–3.6)
LYMPHOCYTES # BLD AUTO: 1.5 X10E3/UL (ref 0.7–3.1)
LYMPHOCYTES NFR BLD AUTO: 29 %
MCH RBC QN AUTO: 30.4 PG (ref 26.6–33)
MCHC RBC AUTO-ENTMCNC: 34.2 G/DL (ref 31.5–35.7)
MCV RBC AUTO: 89 FL (ref 79–97)
MICROALBUMIN UR-MCNC: <3 UG/ML
MONOCYTES # BLD AUTO: 0.5 X10E3/UL (ref 0.1–0.9)
MONOCYTES NFR BLD AUTO: 9 %
NEUTROPHILS # BLD AUTO: 3.2 X10E3/UL (ref 1.4–7)
NEUTROPHILS NFR BLD AUTO: 58 %
PLATELET # BLD AUTO: 241 X10E3/UL (ref 150–450)
POTASSIUM SERPL-SCNC: 4.7 MMOL/L (ref 3.5–5.2)
PROT SERPL-MCNC: 6.7 G/DL (ref 6–8.5)
RBC # BLD AUTO: 4.94 X10E6/UL (ref 4.14–5.8)
SL AMB VLDL CHOLESTEROL CALC: 21 MG/DL (ref 5–40)
SODIUM SERPL-SCNC: 139 MMOL/L (ref 134–144)
TRIGL SERPL-MCNC: 115 MG/DL (ref 0–149)
TSH SERPL DL<=0.005 MIU/L-ACNC: 1.57 UIU/ML (ref 0.45–4.5)
WBC # BLD AUTO: 5.3 X10E3/UL (ref 3.4–10.8)

## 2022-09-15 ENCOUNTER — OFFICE VISIT (OUTPATIENT)
Dept: ENDOCRINOLOGY | Facility: HOSPITAL | Age: 57
End: 2022-09-15
Payer: COMMERCIAL

## 2022-09-15 VITALS
DIASTOLIC BLOOD PRESSURE: 90 MMHG | BODY MASS INDEX: 27.52 KG/M2 | WEIGHT: 181.6 LBS | HEIGHT: 68 IN | SYSTOLIC BLOOD PRESSURE: 140 MMHG | HEART RATE: 88 BPM

## 2022-09-15 DIAGNOSIS — I10 ESSENTIAL HYPERTENSION: ICD-10-CM

## 2022-09-15 DIAGNOSIS — E11.3293 MILD NONPROLIFERATIVE DIABETIC RETINOPATHY OF BOTH EYES WITHOUT MACULAR EDEMA ASSOCIATED WITH TYPE 2 DIABETES MELLITUS (HCC): ICD-10-CM

## 2022-09-15 DIAGNOSIS — E11.319 TYPE 2 DIABETES MELLITUS WITH RETINOPATHY, WITH LONG-TERM CURRENT USE OF INSULIN, MACULAR EDEMA PRESENCE UNSPECIFIED, UNSPECIFIED LATERALITY, UNSPECIFIED RETINOPATHY SEVERITY (HCC): Primary | ICD-10-CM

## 2022-09-15 DIAGNOSIS — Z79.4 TYPE 2 DIABETES MELLITUS WITH RETINOPATHY, WITH LONG-TERM CURRENT USE OF INSULIN, MACULAR EDEMA PRESENCE UNSPECIFIED, UNSPECIFIED LATERALITY, UNSPECIFIED RETINOPATHY SEVERITY (HCC): Primary | ICD-10-CM

## 2022-09-15 DIAGNOSIS — E78.5 HYPERLIPIDEMIA ASSOCIATED WITH TYPE 2 DIABETES MELLITUS (HCC): ICD-10-CM

## 2022-09-15 DIAGNOSIS — E11.69 HYPERLIPIDEMIA ASSOCIATED WITH TYPE 2 DIABETES MELLITUS (HCC): ICD-10-CM

## 2022-09-15 PROCEDURE — 95251 CONT GLUC MNTR ANALYSIS I&R: CPT | Performed by: NURSE PRACTITIONER

## 2022-09-15 PROCEDURE — 99214 OFFICE O/P EST MOD 30 MIN: CPT | Performed by: NURSE PRACTITIONER

## 2022-09-15 NOTE — PATIENT INSTRUCTIONS
Be mindful of diet  Exercise regularly and stay hydrated  Continue NovoLog at current dose  Continue morning Levemir 44 units and 32 units in the evening  Make sure you are injecting insulin consistently  Continue to utilize the [employer]  Contact the office with any consistent hypoglycemia  Continue lisinopril  - consistently  Continue atorvastatin  Obtain lab work prior to next visit  Continue to follow up with the retina specialist   Please ask your ophthalmologist to send us a report to our office

## 2022-09-15 NOTE — PROGRESS NOTES
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  For now, continue current regimen  Marvin Scott will continue to utilize the freestyle adry and for the report to the office in 2 weeks for review and further adjustment, if necessary   Check hemoglobin A1c prior to next visit         2   Hypertension:  Continue lisinopril   Check comprehensive metabolic panel prior to next visit      3   Hyperlipidemia:  Stable  Continue atorvastatin       CC:  Type 2 Diabetes follow-up    History of Present Illness     HPI:  60 y  o  year old male with type 2 diabetes for 19 years   He is on insulin at home and takes Lantus 32 units in the evening and 44 units in the morning with NovoLog 1 unit for every 10 g of carbs with breakfast, lunch, dinner and snack   Her most recent hemoglobin A1c from December 9, 2022 is 7 6  He states that his blood pressures have been high at times throughout the fall as he has been traveling for work and has been eating out more  He denies any recent episodes of hypoglycemia, polyuria, polydipsia, nocturia and blurry vision   He denies neuropathy but does admit to neuropathy and retinopathy       He obtained his annual diabetic eye exam in Spring 2021, by his report   He has mild retinopathy  He saw his retina specialist in February 2022  He is getting injections to treat retinopathy  He complains of some discomfort to his feet intermittently but does not follow podiatry for regular diabetic foot care   Diabetic foot exam was performed at last office visit on September 21, 2021        Blood Sugar/Glucometer/Pump/CGM review: Wallace Nuñez of his personal freestyle jeremy from are 2 through September 15, 2022 reveals relatively well controlled blood sugars throughout the day with some variability at times   His average glucose was 139 with a glucose variability from 27 3   He is in target range 84 % of the time with 15 % high and less than 1% low      For his hypertension, he is treated with lisinopril 10 mg daily   He states his compliance with his lisinopril has improved      His hyperlipidemia is treated with atorvastatin 40 mg daily       Review of Systems   Constitutional: Negative  Negative for chills, fatigue and fever  HENT: Positive for hearing loss  Negative for trouble swallowing and voice change  Eyes: Negative for photophobia, pain, discharge, redness, itching and visual disturbance  Respiratory: Negative for cough and shortness of breath  Cardiovascular: Negative for chest pain and palpitations  Gastrointestinal: Positive for abdominal pain (GERD)  Negative for constipation, diarrhea, nausea and vomiting  Endocrine: Negative for cold intolerance, heat intolerance, polydipsia, polyphagia and polyuria  Genitourinary: Negative  Musculoskeletal: Positive for back pain ( chronic)  Skin: Negative  Allergic/Immunologic: Negative  Neurological: Negative for dizziness, syncope, light-headedness and headaches  Hematological: Negative  Psychiatric/Behavioral: Negative  All other systems reviewed and are negative  Historical Information   History reviewed  No pertinent past medical history    Past Surgical History:   Procedure Laterality Date    NO PAST SURGERIES       Social History   Social History     Substance and Sexual Activity   Alcohol Use Not Currently     Social History     Substance and Sexual Activity   Drug Use No     Social History     Tobacco Use   Smoking Status Former Smoker    Packs/day: 1 00    Years: 20 00    Pack years: 20 00    Types: Cigarettes    Quit date: 2008    Years since quittin 4   Smokeless Tobacco Never Used     Family History:   Family History   Adopted: Yes   Family history unknown: Yes       Meds/Allergies   Current Outpatient Medications   Medication Sig Dispense Refill    ascorbic acid (VITAMIN C) 500 mg tablet Take 500 mg by mouth daily      aspirin (ECOTRIN LOW STRENGTH) 81 mg EC tablet Take 81 mg by mouth daily      atorvastatin (LIPITOR) 40 mg tablet Take 1 tablet (40 mg total) by mouth daily 1 tab daily 30 tablet 11    cholecalciferol (VITAMIN D3) 1,000 units tablet Take 1,000 Units by mouth daily      Continuous Blood Gluc Sensor (FreeStyle Adry 14 Day Sensor) MISC q 14 days 2 each 11    imipramine (TOFRANIL) 50 mg tablet TAKE ONE TABLET BY MOUTH EVERY DAY 30 tablet 11    insulin aspart (NovoLOG) 100 Units/mL injection pen Up to 75 units daily  30 mL 6    insulin glargine (Lantus SoloStar) 100 units/mL injection pen INJECT 44 UNITS UNDER THE SKIN IN THE MORNING AND 32 UNITS IN THE EVENING 30 mL 11    Insulin Pen Needle (B-D UF III MINI PEN NEEDLES) 31G X 5 MM MISC Use 5 needles daily 500 each 3    lisinopril (ZESTRIL) 10 mg tablet Take 1 tablet (10 mg total) by mouth daily 1 tab daily 30 tablet 11     No current facility-administered medications for this visit  No Known Allergies    Objective   Vitals: Blood pressure 140/90, pulse 88, height 5' 8" (1 727 m), weight 82 4 kg (181 lb 9 6 oz)  Physical Exam  Vitals reviewed  Constitutional:       Appearance: He is well-developed  HENT:      Head: Normocephalic and atraumatic  Nose: Nose normal    Eyes:      Conjunctiva/sclera: Conjunctivae normal       Pupils: Pupils are equal, round, and reactive to light  Comments: Wears glasses   Cardiovascular:      Rate and Rhythm: Normal rate and regular rhythm  Heart sounds: Normal heart sounds     Pulmonary:      Effort: Pulmonary effort is normal       Breath sounds: Normal breath sounds  Abdominal:      General: Bowel sounds are normal       Palpations: Abdomen is soft  Musculoskeletal:         General: Normal range of motion  Cervical back: Normal range of motion and neck supple  Skin:     General: Skin is warm and dry  Neurological:      Mental Status: He is alert and oriented to person, place, and time  Psychiatric:         Behavior: Behavior normal          Thought Content:  Thought content normal          Judgment: Judgment normal        Lab Results:   Lab Results   Component Value Date/Time    Hemoglobin A1C 7 6 (H) 09/09/2022 09:34 AM    Hemoglobin A1C 7 6 (H) 06/10/2022 11:33 AM    Hemoglobin A1C 7 3 (H) 03/03/2022 10:28 AM    White Blood Cell Count 5 3 09/09/2022 09:34 AM    White Blood Cell Count 4 7 06/10/2022 11:33 AM    White Blood Cell Count 5 8 03/03/2022 10:28 AM    Hemoglobin 15 0 09/09/2022 09:34 AM    Hemoglobin 14 3 06/10/2022 11:33 AM    Hemoglobin 15 4 03/03/2022 10:28 AM    HCT 43 8 09/09/2022 09:34 AM    HCT 42 2 06/10/2022 11:33 AM    HCT 46 6 03/03/2022 10:28 AM    MCV 89 09/09/2022 09:34 AM    MCV 90 06/10/2022 11:33 AM    MCV 90 03/03/2022 10:28 AM    Platelet Count 421 45/78/0043 09:34 AM    Platelet Count 402 25/46/3950 11:33 AM    Platelet Count 800 55/75/3451 10:28 AM    BUN 19 09/09/2022 09:34 AM    BUN 13 06/10/2022 11:33 AM    BUN 15 03/03/2022 10:28 AM    Potassium 4 7 09/09/2022 09:34 AM    Potassium 4 4 06/10/2022 11:33 AM    Potassium 4 6 03/03/2022 10:28 AM    Chloride 102 09/09/2022 09:34 AM    Chloride 104 06/10/2022 11:33 AM    Chloride 98 03/03/2022 10:28 AM    CO2 25 09/09/2022 09:34 AM    CO2 25 06/10/2022 11:33 AM    CO2 25 03/03/2022 10:28 AM    Creatinine 1 07 09/09/2022 09:34 AM    Creatinine 1 08 06/10/2022 11:33 AM    Creatinine 1 16 03/03/2022 10:28 AM    AST 25 09/09/2022 09:34 AM    AST 21 06/10/2022 11:33 AM    AST 28 03/03/2022 10:28 AM    ALT 31 09/09/2022 09:34 AM    ALT 25 06/10/2022 11:33 AM    ALT 30 03/03/2022 10:28 AM    Albumin 4 7 09/09/2022 09:34 AM    Albumin 4 4 06/10/2022 11:33 AM    Albumin 4 5 03/03/2022 10:28 AM    Globulin, Total 2 0 09/09/2022 09:34 AM    Globulin, Total 2 3 06/10/2022 11:33 AM    Globulin, Total 2 5 03/03/2022 10:28 AM    HDL 42 09/09/2022 09:34 AM    HDL 35 (L) 06/10/2022 11:33 AM    HDL 38 (L) 12/02/2021 10:04 AM    Triglycerides 115 09/09/2022 09:34 AM    Triglycerides 126 06/10/2022 11:33 AM    Triglycerides 158 (H) 12/02/2021 10:04 AM     Portions of the record may have been created with voice recognition software  Occasional wrong word or "sound a like" substitutions may have occurred due to the inherent limitations of voice recognition software  Read the chart carefully and recognize, using context, where substitutions have occurred

## 2022-10-17 ENCOUNTER — OFFICE VISIT (OUTPATIENT)
Dept: FAMILY MEDICINE CLINIC | Facility: CLINIC | Age: 57
End: 2022-10-17
Payer: COMMERCIAL

## 2022-10-17 VITALS
TEMPERATURE: 97.3 F | DIASTOLIC BLOOD PRESSURE: 80 MMHG | HEART RATE: 84 BPM | OXYGEN SATURATION: 100 % | WEIGHT: 181 LBS | HEIGHT: 68 IN | BODY MASS INDEX: 27.43 KG/M2 | SYSTOLIC BLOOD PRESSURE: 140 MMHG

## 2022-10-17 DIAGNOSIS — I10 ESSENTIAL HYPERTENSION: ICD-10-CM

## 2022-10-17 DIAGNOSIS — E11.319 TYPE 2 DIABETES MELLITUS WITH RETINOPATHY, WITH LONG-TERM CURRENT USE OF INSULIN, MACULAR EDEMA PRESENCE UNSPECIFIED, UNSPECIFIED LATERALITY, UNSPECIFIED RETINOPATHY SEVERITY (HCC): ICD-10-CM

## 2022-10-17 DIAGNOSIS — Z79.4 TYPE 2 DIABETES MELLITUS WITH RETINOPATHY, WITH LONG-TERM CURRENT USE OF INSULIN, MACULAR EDEMA PRESENCE UNSPECIFIED, UNSPECIFIED LATERALITY, UNSPECIFIED RETINOPATHY SEVERITY (HCC): ICD-10-CM

## 2022-10-17 DIAGNOSIS — H26.9 CATARACT OF BOTH EYES, UNSPECIFIED CATARACT TYPE: ICD-10-CM

## 2022-10-17 DIAGNOSIS — Z01.818 PRE-OP EXAMINATION: Primary | ICD-10-CM

## 2022-10-17 DIAGNOSIS — Z12.11 COLON CANCER SCREENING: ICD-10-CM

## 2022-10-17 PROCEDURE — 99214 OFFICE O/P EST MOD 30 MIN: CPT | Performed by: FAMILY MEDICINE

## 2022-10-17 NOTE — PROGRESS NOTES
[address]        NAME: Nicol Feldman is a 59 y o  male  : 1965    MRN: [id number]  DATE: 2022  TIME: 12:41 PM    Assessment and Plan   Pre-op examination [Z01 818]  1  Pre-op examination     2  Cataract of both eyes, unspecified cataract type     3  Type 2 diabetes mellitus with retinopathy, with long-term current use of insulin, macular edema presence unspecified, unspecified laterality, unspecified retinopathy severity ([address] )     4  Essential hypertension     5  Colon cancer screening  Cologuard       No problem-specific Assessment & Plan notes found for this encounter  Patient Instructions     Patient Instructions   Patient medically stable for proposed cataract surgeries  Chief Complaint     Chief Complaint   Patient presents with   • Pre-op Exam     FOOT EXAM          History of Present Illness       PATIENT HERE FOR PREOPERATIVE CLEARANCE FOR BILATERAL CATARACT PROCEDURES  INITIAL PROCEDURES SCHEDULED FOR 2022  Operating surgeon Dr Grecia Ortiz  To be done at [facility] outpatient center  No preoperative testing was advised  Last A1c in September was 7 6  Review of Systems   Review of Systems   Constitutional: Negative for activity change, appetite change, diaphoresis and fatigue  HENT: Negative for congestion, sinus pressure and sore throat  Eyes: Positive for visual disturbance  Respiratory: Negative for cough, chest tightness, shortness of breath and wheezing  Cardiovascular: Negative for chest pain, palpitations and leg swelling  Fast or slow heart rate   Gastrointestinal: Negative for abdominal pain, blood in stool, constipation, diarrhea, nausea and vomiting  Genitourinary: Negative for difficulty urinating, dysuria, frequency and hematuria  Musculoskeletal: Negative for arthralgias, gait problem, joint swelling and myalgias  Neurological: Negative for dizziness, light-headedness and headaches  Psychiatric/Behavioral: Negative for agitation, confusion, dysphoric mood and sleep disturbance  The patient is not nervous/anxious  Current Medications       Current Outpatient Medications:   •  ascorbic acid (VITAMIN C) 500 mg tablet, Take 500 mg by mouth daily, Disp: , Rfl:   •  aspirin (ECOTRIN LOW STRENGTH) 81 mg EC tablet, Take 81 mg by mouth daily, Disp: , Rfl:   •  atorvastatin (LIPITOR) 40 mg tablet, Take 1 tablet (40 mg total) by mouth daily 1 tab daily, Disp: 30 tablet, Rfl: 11  •  cholecalciferol (VITAMIN D3) 1,000 units tablet, Take 1,000 Units by mouth daily, Disp: , Rfl:   •  Continuous Blood Gluc Sensor (FreeStyle Adry 14 Day Sensor) MISC, q 14 days, Disp: 2 each, Rfl: 11  •  imipramine (TOFRANIL) 50 mg tablet, TAKE ONE TABLET BY MOUTH EVERY DAY, Disp: 30 tablet, Rfl: 11  •  insulin aspart (NovoLOG) 100 Units/mL injection pen, Up to 75 units daily  , Disp: 30 mL, Rfl: 6  •  insulin glargine (Lantus SoloStar) 100 units/mL injection pen, INJECT 44 UNITS UNDER THE SKIN IN THE MORNING AND 32 UNITS IN THE EVENING, Disp: 30 mL, Rfl: 11  •  Insulin Pen Needle (B-D UF III MINI PEN NEEDLES) 31G X 5 MM MISC, Use 5 needles daily, Disp: 500 each, Rfl: 3  •  lisinopril (ZESTRIL) 10 mg tablet, Take 1 tablet (10 mg total) by mouth daily 1 tab daily, Disp: 30 tablet, Rfl: 11    Current Allergies     Allergies as of 10/17/2022   • (No Known Allergies)            The following portions of the patient's history were reviewed and updated as appropriate: allergies, current medications, past family history, past medical history, past social history, past surgical history and problem list      History reviewed  No pertinent past medical history  Past Surgical History:   Procedure Laterality Date   • NO PAST SURGERIES         Family History   Adopted: Yes   Family history unknown: Yes         Medications have been verified          Objective   /80 (BP Location: Left arm, Patient Position: Sitting, Cuff Size: Adult)   Pulse 84   Temp (!) 97 3 °F (36 3 °C) (Tympanic)   Ht 5' 8" (1 727 m)   Wt 82 1 kg (181 lb)   SpO2 100%   BMI 27 52 kg/m²        Physical Exam     Physical Exam  Vitals reviewed  Constitutional:       General: He is not in acute distress  Appearance: He is well-developed  He is not diaphoretic  HENT:      Head: Normocephalic and atraumatic  Right Ear: Tympanic membrane, ear canal and external ear normal       Left Ear: Tympanic membrane, ear canal and external ear normal       Nose: Nose normal  No mucosal edema or rhinorrhea  Right Sinus: No maxillary sinus tenderness  Left Sinus: No maxillary sinus tenderness  Mouth/Throat:      Mouth: Mucous membranes are not pale and not dry  Dentition: Normal dentition  Pharynx: Uvula midline  No oropharyngeal exudate  Eyes:      General:         Right eye: No discharge  Left eye: No discharge  Extraocular Movements: Extraocular movements intact  Conjunctiva/sclera: Conjunctivae normal       Pupils: Pupils are equal, round, and reactive to light  Neck:      Thyroid: No thyromegaly  Cardiovascular:      Rate and Rhythm: Normal rate and regular rhythm  Pulses: no weak pulses          Dorsalis pedis pulses are 2+ on the right side and 2+ on the left side  Posterior tibial pulses are 2+ on the right side and 2+ on the left side  Heart sounds: Normal heart sounds  No murmur heard  Pulmonary:      Effort: Pulmonary effort is normal  No respiratory distress  Breath sounds: Normal breath sounds  No wheezing or rales  Musculoskeletal:      Cervical back: Normal range of motion and neck supple  Right lower leg: No edema  Left lower leg: No edema  Feet:      Right foot:      Skin integrity: No ulcer, skin breakdown, erythema, warmth, callus or dry skin  Left foot:      Skin integrity: No ulcer, skin breakdown, erythema, warmth, callus or dry skin     Lymphadenopathy: Cervical: No cervical adenopathy  Skin:     Findings: No rash  Neurological:      Mental Status: He is alert and oriented to person, place, and time  Cranial Nerves: No cranial nerve deficit  Psychiatric:         Mood and Affect: Mood normal          Behavior: Behavior normal           Patient's shoes and socks removed  Right Foot/Ankle   Right Foot Inspection  Skin Exam: skin normal and skin intact  No dry skin, no warmth, no callus, no erythema, no maceration, no abnormal color, no pre-ulcer, no ulcer and no callus  Toe Exam: ROM and strength within normal limits  Sensory   Vibration: intact  Proprioception: intact  Monofilament testing: intact    Vascular  Capillary refills: < 3 seconds  The right DP pulse is 2+  The right PT pulse is 2+  Left Foot/Ankle  Left Foot Inspection  Skin Exam: skin normal and skin intact  No dry skin, no warmth, no erythema, no maceration, normal color, no pre-ulcer, no ulcer and no callus  Toe Exam: ROM and strength within normal limits  Sensory   Vibration: intact  Proprioception: intact  Monofilament testing: intact    Vascular  Capillary refills: < 3 seconds  The left DP pulse is 2+  The left PT pulse is 2+       Assign Risk Category  No deformity present  No loss of protective sensation  No weak pulses  Risk: 0

## 2022-12-15 LAB
ALBUMIN SERPL-MCNC: 4.7 G/DL (ref 3.8–4.9)
ALBUMIN/GLOB SERPL: 2 {RATIO} (ref 1.2–2.2)
ALP SERPL-CCNC: 68 IU/L (ref 44–121)
ALT SERPL-CCNC: 32 IU/L (ref 0–44)
AST SERPL-CCNC: 29 IU/L (ref 0–40)
BASOPHILS # BLD AUTO: 0.1 X10E3/UL (ref 0–0.2)
BASOPHILS NFR BLD AUTO: 1 %
BILIRUB SERPL-MCNC: 0.4 MG/DL (ref 0–1.2)
BUN SERPL-MCNC: 23 MG/DL (ref 6–24)
BUN/CREAT SERPL: 21 (ref 9–20)
CALCIUM SERPL-MCNC: 9.5 MG/DL (ref 8.7–10.2)
CHLORIDE SERPL-SCNC: 99 MMOL/L (ref 96–106)
CO2 SERPL-SCNC: 25 MMOL/L (ref 20–29)
CREAT SERPL-MCNC: 1.12 MG/DL (ref 0.76–1.27)
EGFR: 76 ML/MIN/1.73
EOSINOPHIL # BLD AUTO: 0.2 X10E3/UL (ref 0–0.4)
EOSINOPHIL NFR BLD AUTO: 2 %
ERYTHROCYTE [DISTWIDTH] IN BLOOD BY AUTOMATED COUNT: 12.7 % (ref 11.6–15.4)
EST. AVERAGE GLUCOSE BLD GHB EST-MCNC: 174 MG/DL
GLOBULIN SER-MCNC: 2.3 G/DL (ref 1.5–4.5)
GLUCOSE SERPL-MCNC: 132 MG/DL (ref 70–99)
HBA1C MFR BLD: 7.7 % (ref 4.8–5.6)
HCT VFR BLD AUTO: 42.7 % (ref 37.5–51)
HGB BLD-MCNC: 14.4 G/DL (ref 13–17.7)
IMM GRANULOCYTES # BLD: 0 X10E3/UL (ref 0–0.1)
IMM GRANULOCYTES NFR BLD: 0 %
LYMPHOCYTES # BLD AUTO: 1.7 X10E3/UL (ref 0.7–3.1)
LYMPHOCYTES NFR BLD AUTO: 19 %
MCH RBC QN AUTO: 30.1 PG (ref 26.6–33)
MCHC RBC AUTO-ENTMCNC: 33.7 G/DL (ref 31.5–35.7)
MCV RBC AUTO: 89 FL (ref 79–97)
MONOCYTES # BLD AUTO: 0.8 X10E3/UL (ref 0.1–0.9)
MONOCYTES NFR BLD AUTO: 9 %
NEUTROPHILS # BLD AUTO: 6.1 X10E3/UL (ref 1.4–7)
NEUTROPHILS NFR BLD AUTO: 69 %
PLATELET # BLD AUTO: 260 X10E3/UL (ref 150–450)
POTASSIUM SERPL-SCNC: 4.1 MMOL/L (ref 3.5–5.2)
PROT SERPL-MCNC: 7 G/DL (ref 6–8.5)
RBC # BLD AUTO: 4.79 X10E6/UL (ref 4.14–5.8)
SODIUM SERPL-SCNC: 138 MMOL/L (ref 134–144)
TSH SERPL DL<=0.005 MIU/L-ACNC: 2.06 UIU/ML (ref 0.45–4.5)
WBC # BLD AUTO: 8.7 X10E3/UL (ref 3.4–10.8)

## 2022-12-21 ENCOUNTER — OFFICE VISIT (OUTPATIENT)
Dept: ENDOCRINOLOGY | Facility: HOSPITAL | Age: 57
End: 2022-12-21

## 2022-12-21 VITALS
BODY MASS INDEX: 27.68 KG/M2 | SYSTOLIC BLOOD PRESSURE: 142 MMHG | DIASTOLIC BLOOD PRESSURE: 72 MMHG | HEIGHT: 68 IN | WEIGHT: 182.6 LBS | HEART RATE: 98 BPM

## 2022-12-21 DIAGNOSIS — I10 ESSENTIAL HYPERTENSION: ICD-10-CM

## 2022-12-21 DIAGNOSIS — E11.69 HYPERLIPIDEMIA ASSOCIATED WITH TYPE 2 DIABETES MELLITUS (HCC): ICD-10-CM

## 2022-12-21 DIAGNOSIS — Z79.4 TYPE 2 DIABETES MELLITUS WITH RETINOPATHY, WITH LONG-TERM CURRENT USE OF INSULIN, MACULAR EDEMA PRESENCE UNSPECIFIED, UNSPECIFIED LATERALITY, UNSPECIFIED RETINOPATHY SEVERITY (HCC): Primary | ICD-10-CM

## 2022-12-21 DIAGNOSIS — E11.319 TYPE 2 DIABETES MELLITUS WITH RETINOPATHY, WITH LONG-TERM CURRENT USE OF INSULIN, MACULAR EDEMA PRESENCE UNSPECIFIED, UNSPECIFIED LATERALITY, UNSPECIFIED RETINOPATHY SEVERITY (HCC): Primary | ICD-10-CM

## 2022-12-21 DIAGNOSIS — E78.5 HYPERLIPIDEMIA ASSOCIATED WITH TYPE 2 DIABETES MELLITUS (HCC): ICD-10-CM

## 2022-12-21 DIAGNOSIS — E11.3293 MILD NONPROLIFERATIVE DIABETIC RETINOPATHY OF BOTH EYES WITHOUT MACULAR EDEMA ASSOCIATED WITH TYPE 2 DIABETES MELLITUS (HCC): ICD-10-CM

## 2022-12-21 RX ORDER — INSULIN GLARGINE 100 [IU]/ML
INJECTION, SOLUTION SUBCUTANEOUS
Qty: 30 ML | Refills: 11 | Status: SHIPPED | OUTPATIENT
Start: 2022-12-21

## 2022-12-21 NOTE — PATIENT INSTRUCTIONS
Be mindful of diet  Exercise regularly and stay hydrated  Continue NovoLog at current dose  Continue morning Levemir 44 units and 32 units in the evening  When traveling and eating larger dinners, please utilizes a 1:8 insulin to carbohydrate ratio  Make sure you are injecting insulin consistently  Continue to utilize the [employer]  Contact the office with any consistent hypoglycemia  Continue lisinopril  Continue atorvastatin  Obtain lab work prior to next visit

## 2022-12-21 NOTE — PROGRESS NOTES
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  For now, continue current regimen  Did discuss adjusting his insulin to carbohydrate ratio at dinnertime when he is traveling to 1: 8 as he typically eats larger meals and experiences hyperglycemia after dinner and throughout the evening  Karishma Chávez will continue to utilize the [employer] and for the report to the office in 2 weeks for review and further adjustment, if necessary   Check hemoglobin A1c prior to next visit         2   Hypertension:  Continue lisinopril   Check comprehensive metabolic panel prior to next visit      3   Hyperlipidemia:  Continue atorvastatin   Check fasting lipid panel prior to next visit  CC: Type 2 Diabetes follow-up    History of Present Illness     HPI:  60 y  o  year old male with type 2 diabetes for 19 years   He is on insulin at home and takes Lantus 32 units in the evening and 44 units in the morning with NovoLog 1 unit for every 10 g of carbs with breakfast, lunch, dinner and snack   Her most recent hemoglobin A1c from December 14, 2022 is 7 7  He states that his blood pressures have been high at times throughout the fall as he has been traveling for work and has been eating out more  He denies any recent episodes of hypoglycemia, polyuria, polydipsia, nocturia and blurry vision   He denies neuropathy but does admit to neuropathy and retinopathy       He obtained his annual diabetic eye exam in Spring 2021, by his report   He has mild retinopathy  He saw his retina specialist in February 2022  He is getting injections to treat retinopathy  He complains of some discomfort to his feet intermittently but does not follow podiatry for regular diabetic foot care   Diabetic foot exam was performed at last office visit on October 17, 2022        Blood Sugar/Glucometer/Pump/CGM review: Anh Calloway of his personal freestyle jeremy from December 8 through December 21, 2022 reveals relatively well controlled blood sugars throughout the day with some variability at times   His average glucose was 139 with a glucose variability from 27 3   He is in target range 84 % of the time with 15 % high and less than 1% low      For his hypertension, he is treated with lisinopril 10 mg daily   He states his compliance with his lisinopril has improved      His hyperlipidemia is treated with atorvastatin 40 mg daily       Review of Systems   Constitutional: Negative  Negative for fatigue  HENT: Negative  Respiratory: Negative for cough and shortness of breath  Cardiovascular: Negative for chest pain and palpitations  Gastrointestinal: Negative for abdominal pain, constipation, diarrhea, nausea and vomiting  Genitourinary: Negative  Musculoskeletal: Negative  Skin: Negative  Allergic/Immunologic: Negative  Neurological: Negative for syncope, light-headedness and headaches  Hematological: Negative  Psychiatric/Behavioral: Negative  All other systems reviewed and are negative  Historical Information   History reviewed  No pertinent past medical history    Past Surgical History:   Procedure Laterality Date   • NO PAST SURGERIES       Social History   Social History     Substance and Sexual Activity   Alcohol Use Not Currently     Social History     Substance and Sexual Activity   Drug Use No     Social History     Tobacco Use   Smoking Status Former   • Packs/day: 1 00   • Years: 20 00   • Pack years: 20 00   • Types: Cigarettes   • Quit date: 2008   • Years since quittin 7   Smokeless Tobacco Never     Family History:   Family History   Adopted: Yes   Family history unknown: Yes       Meds/Allergies   Current Outpatient Medications   Medication Sig Dispense Refill   • ascorbic acid (VITAMIN C) 500 mg tablet Take 500 mg by mouth daily     • aspirin (ECOTRIN LOW STRENGTH) 81 mg EC tablet Take 81 mg by mouth daily     • atorvastatin (LIPITOR) 40 mg tablet Take 1 tablet (40 mg total) by mouth daily 1 tab daily 30 tablet 11   • cholecalciferol (VITAMIN D3) 1,000 units tablet Take 1,000 Units by mouth daily     • Continuous Blood Gluc Sensor (FreeStyle Adry 14 Day Sensor) MISC q 14 days 2 each 11   • imipramine (TOFRANIL) 50 mg tablet TAKE ONE TABLET BY MOUTH EVERY DAY 30 tablet 11   • insulin aspart (NovoLOG) 100 Units/mL injection pen Up to 75 units daily  30 mL 6   • insulin glargine (Lantus SoloStar) 100 units/mL injection pen INJECT 44 UNITS UNDER THE SKIN IN THE MORNING AND 32 UNITS IN THE EVENING 30 mL 11   • Insulin Pen Needle (B-D UF III MINI PEN NEEDLES) 31G X 5 MM MISC Use 5 needles daily 500 each 3   • lisinopril (ZESTRIL) 10 mg tablet Take 1 tablet (10 mg total) by mouth daily 1 tab daily 30 tablet 11     No current facility-administered medications for this visit  No Known Allergies    Objective   Vitals: There were no vitals taken for this visit  Physical Exam  Vitals reviewed  Constitutional:       Appearance: He is well-developed  HENT:      Head: Normocephalic and atraumatic  Nose: Nose normal    Eyes:      Conjunctiva/sclera: Conjunctivae normal       Pupils: Pupils are equal, round, and reactive to light  Cardiovascular:      Rate and Rhythm: Normal rate and regular rhythm  Heart sounds: Normal heart sounds  Pulmonary:      Effort: Pulmonary effort is normal       Breath sounds: Normal breath sounds  Abdominal:      General: Bowel sounds are normal       Palpations: Abdomen is soft     Musculoskeletal: General: Normal range of motion  Cervical back: Normal range of motion and neck supple  Skin:     General: Skin is warm and dry  Neurological:      Mental Status: He is alert and oriented to person, place, and time  Psychiatric:         Behavior: Behavior normal          Thought Content:  Thought content normal          Judgment: Judgment normal        Lab Results:   Lab Results   Component Value Date/Time    Hemoglobin A1C 7 7 (H) 12/14/2022 08:18 AM    Hemoglobin A1C 7 6 (H) 09/09/2022 09:34 AM    Hemoglobin A1C 7 6 (H) 06/10/2022 11:33 AM    White Blood Cell Count 8 7 12/14/2022 08:18 AM    White Blood Cell Count 5 3 09/09/2022 09:34 AM    White Blood Cell Count 4 7 06/10/2022 11:33 AM    Hemoglobin 14 4 12/14/2022 08:18 AM    Hemoglobin 15 0 09/09/2022 09:34 AM    Hemoglobin 14 3 06/10/2022 11:33 AM    HCT 42 7 12/14/2022 08:18 AM    HCT 43 8 09/09/2022 09:34 AM    HCT 42 2 06/10/2022 11:33 AM    MCV 89 12/14/2022 08:18 AM    MCV 89 09/09/2022 09:34 AM    MCV 90 06/10/2022 11:33 AM    Platelet Count 606 79/47/5342 08:18 AM    Platelet Count 242 77/09/5224 09:34 AM    Platelet Count 282 72/49/6705 11:33 AM    BUN 23 12/14/2022 08:18 AM    BUN 19 09/09/2022 09:34 AM    BUN 13 06/10/2022 11:33 AM    Potassium 4 1 12/14/2022 08:18 AM    Potassium 4 7 09/09/2022 09:34 AM    Potassium 4 4 06/10/2022 11:33 AM    Chloride 99 12/14/2022 08:18 AM    Chloride 102 09/09/2022 09:34 AM    Chloride 104 06/10/2022 11:33 AM    CO2 25 12/14/2022 08:18 AM    CO2 25 09/09/2022 09:34 AM    CO2 25 06/10/2022 11:33 AM    Creatinine 1 12 12/14/2022 08:18 AM    Creatinine 1 07 09/09/2022 09:34 AM    Creatinine 1 08 06/10/2022 11:33 AM    AST 29 12/14/2022 08:18 AM    AST 25 09/09/2022 09:34 AM    AST 21 06/10/2022 11:33 AM    ALT 32 12/14/2022 08:18 AM    ALT 31 09/09/2022 09:34 AM    ALT 25 06/10/2022 11:33 AM    Albumin 4 7 12/14/2022 08:18 AM    Albumin 4 7 09/09/2022 09:34 AM    Albumin 4 4 06/10/2022 11:33 AM Globulin, Total 2 3 12/14/2022 08:18 AM    Globulin, Total 2 0 09/09/2022 09:34 AM    Globulin, Total 2 3 06/10/2022 11:33 AM    HDL 42 09/09/2022 09:34 AM    HDL 35 (L) 06/10/2022 11:33 AM    Triglycerides 115 09/09/2022 09:34 AM    Triglycerides 126 06/10/2022 11:33 AM     Portions of the record may have been created with voice recognition software  Occasional wrong word or "sound a like" substitutions may have occurred due to the inherent limitations of voice recognition software  Read the chart carefully and recognize, using context, where substitutions have occurred

## 2022-12-26 DIAGNOSIS — E11.319 TYPE 2 DIABETES MELLITUS WITH RETINOPATHY, WITH LONG-TERM CURRENT USE OF INSULIN, MACULAR EDEMA PRESENCE UNSPECIFIED, UNSPECIFIED LATERALITY, UNSPECIFIED RETINOPATHY SEVERITY (HCC): ICD-10-CM

## 2022-12-26 DIAGNOSIS — Z79.4 TYPE 2 DIABETES MELLITUS WITH RETINOPATHY, WITH LONG-TERM CURRENT USE OF INSULIN, MACULAR EDEMA PRESENCE UNSPECIFIED, UNSPECIFIED LATERALITY, UNSPECIFIED RETINOPATHY SEVERITY (HCC): ICD-10-CM

## 2022-12-27 RX ORDER — INSULIN ASPART 100 [IU]/ML
INJECTION, SOLUTION INTRAVENOUS; SUBCUTANEOUS
Qty: 30 ML | Refills: 6 | Status: SHIPPED | OUTPATIENT
Start: 2022-12-27

## 2022-12-29 ENCOUNTER — TELEPHONE (OUTPATIENT)
Dept: FAMILY MEDICINE CLINIC | Facility: CLINIC | Age: 57
End: 2022-12-29

## 2022-12-29 NOTE — TELEPHONE ENCOUNTER
Confirmation Rios Situ [id number]  Effective: 2022     Expires: 2023  Active  Referred From  [address] [place] [address] Practice  Group UDF: [id number]  Provider T  Tax YN: [id number]  [address] [address]  Referred To  [address] [place] [address]  Specialty: Not Available  Tier 2  Group YQV: [id number]  Provider FD: [id number]  Tax FQ: [id number]  This referral is valid at any location for the above group  Patient Info  Lina Eddy  [id number]  Male  1965  [facility]  Clinical Information  Place of Service  Office  Service Type  Medical Care  Diagnoses  5 [id number] - type 2 diabetes mellitus with mild nonproliferative diabetic retinopathy with macular edema, right eye  Procedures  None entered  Notes  Please approve for 10 visits

## 2023-01-13 LAB
DME PARACHUTE DELIVERY DATE REQUESTED: NORMAL
DME PARACHUTE ITEM DESCRIPTION: NORMAL
DME PARACHUTE ORDER STATUS: NORMAL
DME PARACHUTE SUPPLIER NAME: NORMAL
DME PARACHUTE SUPPLIER PHONE: NORMAL

## 2023-01-26 DIAGNOSIS — F41.0 PANIC DISORDER: ICD-10-CM

## 2023-01-26 RX ORDER — IMIPRAMINE HCL 50 MG
TABLET ORAL
Qty: 30 TABLET | Refills: 11 | Status: SHIPPED | OUTPATIENT
Start: 2023-01-26

## 2023-03-18 LAB
ALBUMIN SERPL-MCNC: 4.6 G/DL (ref 3.8–4.9)
ALBUMIN/GLOB SERPL: 2 {RATIO} (ref 1.2–2.2)
ALP SERPL-CCNC: 71 IU/L (ref 44–121)
ALT SERPL-CCNC: 36 IU/L (ref 0–44)
AST SERPL-CCNC: 37 IU/L (ref 0–40)
BASOPHILS # BLD AUTO: 0.1 X10E3/UL (ref 0–0.2)
BASOPHILS NFR BLD AUTO: 1 %
BILIRUB SERPL-MCNC: 0.4 MG/DL (ref 0–1.2)
BUN SERPL-MCNC: 14 MG/DL (ref 8–27)
BUN/CREAT SERPL: 13 (ref 10–24)
CALCIUM SERPL-MCNC: 9.2 MG/DL (ref 8.6–10.2)
CHLORIDE SERPL-SCNC: 98 MMOL/L (ref 96–106)
CHOLEST SERPL-MCNC: 131 MG/DL (ref 100–199)
CHOLEST/HDLC SERPL: 3.2 RATIO (ref 0–5)
CO2 SERPL-SCNC: 25 MMOL/L (ref 20–29)
CREAT SERPL-MCNC: 1.07 MG/DL (ref 0.76–1.27)
EGFR: 79 ML/MIN/1.73
EOSINOPHIL # BLD AUTO: 0.2 X10E3/UL (ref 0–0.4)
EOSINOPHIL NFR BLD AUTO: 4 %
ERYTHROCYTE [DISTWIDTH] IN BLOOD BY AUTOMATED COUNT: 12.5 % (ref 11.6–15.4)
EST. AVERAGE GLUCOSE BLD GHB EST-MCNC: 192 MG/DL
GLOBULIN SER-MCNC: 2.3 G/DL (ref 1.5–4.5)
GLUCOSE SERPL-MCNC: 126 MG/DL (ref 70–99)
HBA1C MFR BLD: 8.3 % (ref 4.8–5.6)
HCT VFR BLD AUTO: 42.6 % (ref 37.5–51)
HDLC SERPL-MCNC: 41 MG/DL
HGB BLD-MCNC: 14.6 G/DL (ref 13–17.7)
IMM GRANULOCYTES # BLD: 0 X10E3/UL (ref 0–0.1)
IMM GRANULOCYTES NFR BLD: 0 %
LDLC SERPL CALC-MCNC: 72 MG/DL (ref 0–99)
LYMPHOCYTES # BLD AUTO: 1.6 X10E3/UL (ref 0.7–3.1)
LYMPHOCYTES NFR BLD AUTO: 30 %
MCH RBC QN AUTO: 30.4 PG (ref 26.6–33)
MCHC RBC AUTO-ENTMCNC: 34.3 G/DL (ref 31.5–35.7)
MCV RBC AUTO: 89 FL (ref 79–97)
MONOCYTES # BLD AUTO: 0.6 X10E3/UL (ref 0.1–0.9)
MONOCYTES NFR BLD AUTO: 12 %
NEUTROPHILS # BLD AUTO: 2.8 X10E3/UL (ref 1.4–7)
NEUTROPHILS NFR BLD AUTO: 53 %
PLATELET # BLD AUTO: 236 X10E3/UL (ref 150–450)
POTASSIUM SERPL-SCNC: 4.5 MMOL/L (ref 3.5–5.2)
PROT SERPL-MCNC: 6.9 G/DL (ref 6–8.5)
RBC # BLD AUTO: 4.8 X10E6/UL (ref 4.14–5.8)
SL AMB VLDL CHOLESTEROL CALC: 18 MG/DL (ref 5–40)
SODIUM SERPL-SCNC: 138 MMOL/L (ref 134–144)
TRIGL SERPL-MCNC: 96 MG/DL (ref 0–149)
WBC # BLD AUTO: 5.3 X10E3/UL (ref 3.4–10.8)

## 2023-03-20 DIAGNOSIS — Z79.4 TYPE 2 DIABETES MELLITUS WITH RETINOPATHY, WITH LONG-TERM CURRENT USE OF INSULIN, MACULAR EDEMA PRESENCE UNSPECIFIED, UNSPECIFIED LATERALITY, UNSPECIFIED RETINOPATHY SEVERITY (HCC): ICD-10-CM

## 2023-03-20 DIAGNOSIS — E11.319 TYPE 2 DIABETES MELLITUS WITH RETINOPATHY, WITH LONG-TERM CURRENT USE OF INSULIN, MACULAR EDEMA PRESENCE UNSPECIFIED, UNSPECIFIED LATERALITY, UNSPECIFIED RETINOPATHY SEVERITY (HCC): ICD-10-CM

## 2023-03-20 RX ORDER — INSULIN GLARGINE 100 [IU]/ML
INJECTION, SOLUTION SUBCUTANEOUS
Qty: 30 ML | Refills: 2 | Status: SHIPPED | OUTPATIENT
Start: 2023-03-20

## 2023-03-23 ENCOUNTER — OFFICE VISIT (OUTPATIENT)
Dept: ENDOCRINOLOGY | Facility: HOSPITAL | Age: 58
End: 2023-03-23

## 2023-03-23 VITALS
DIASTOLIC BLOOD PRESSURE: 74 MMHG | HEIGHT: 68 IN | SYSTOLIC BLOOD PRESSURE: 122 MMHG | BODY MASS INDEX: 27.68 KG/M2 | HEART RATE: 79 BPM | WEIGHT: 182.6 LBS

## 2023-03-23 DIAGNOSIS — E78.5 HYPERLIPIDEMIA ASSOCIATED WITH TYPE 2 DIABETES MELLITUS (HCC): ICD-10-CM

## 2023-03-23 DIAGNOSIS — Z79.4 TYPE 2 DIABETES MELLITUS WITH RETINOPATHY, WITH LONG-TERM CURRENT USE OF INSULIN, MACULAR EDEMA PRESENCE UNSPECIFIED, UNSPECIFIED LATERALITY, UNSPECIFIED RETINOPATHY SEVERITY (HCC): Primary | ICD-10-CM

## 2023-03-23 DIAGNOSIS — E11.319 TYPE 2 DIABETES MELLITUS WITH RETINOPATHY, WITH LONG-TERM CURRENT USE OF INSULIN, MACULAR EDEMA PRESENCE UNSPECIFIED, UNSPECIFIED LATERALITY, UNSPECIFIED RETINOPATHY SEVERITY (HCC): Primary | ICD-10-CM

## 2023-03-23 DIAGNOSIS — E11.3293 MILD NONPROLIFERATIVE DIABETIC RETINOPATHY OF BOTH EYES WITHOUT MACULAR EDEMA ASSOCIATED WITH TYPE 2 DIABETES MELLITUS (HCC): ICD-10-CM

## 2023-03-23 DIAGNOSIS — E11.69 HYPERLIPIDEMIA ASSOCIATED WITH TYPE 2 DIABETES MELLITUS (HCC): ICD-10-CM

## 2023-03-23 DIAGNOSIS — I10 ESSENTIAL HYPERTENSION: ICD-10-CM

## 2023-03-23 RX ORDER — FLASH GLUCOSE SENSOR
KIT MISCELLANEOUS
Qty: 2 EACH | Refills: 11 | Status: SHIPPED | OUTPATIENT
Start: 2023-03-23

## 2023-04-05 LAB
LEFT EYE DIABETIC RETINOPATHY: POSITIVE
RIGHT EYE DIABETIC RETINOPATHY: POSITIVE

## 2023-04-05 PROCEDURE — 2022F DILAT RTA XM EVC RTNOPTHY: CPT | Performed by: NURSE PRACTITIONER

## 2023-04-25 ENCOUNTER — VBI (OUTPATIENT)
Dept: ADMINISTRATIVE | Facility: OTHER | Age: 58
End: 2023-04-25

## 2023-07-04 LAB
ALBUMIN SERPL-MCNC: 4.4 G/DL (ref 3.8–4.9)
ALBUMIN/GLOB SERPL: 2.1 {RATIO} (ref 1.2–2.2)
ALP SERPL-CCNC: 63 IU/L (ref 44–121)
ALT SERPL-CCNC: 25 IU/L (ref 0–44)
AST SERPL-CCNC: 26 IU/L (ref 0–40)
BASOPHILS # BLD AUTO: 0.1 X10E3/UL (ref 0–0.2)
BASOPHILS NFR BLD AUTO: 1 %
BILIRUB SERPL-MCNC: 0.4 MG/DL (ref 0–1.2)
BUN SERPL-MCNC: 18 MG/DL (ref 8–27)
BUN/CREAT SERPL: 16 (ref 10–24)
CALCIUM SERPL-MCNC: 9.4 MG/DL (ref 8.6–10.2)
CHLORIDE SERPL-SCNC: 101 MMOL/L (ref 96–106)
CO2 SERPL-SCNC: 23 MMOL/L (ref 20–29)
CREAT SERPL-MCNC: 1.14 MG/DL (ref 0.76–1.27)
EGFR: 74 ML/MIN/1.73
EOSINOPHIL # BLD AUTO: 0.2 X10E3/UL (ref 0–0.4)
EOSINOPHIL NFR BLD AUTO: 4 %
ERYTHROCYTE [DISTWIDTH] IN BLOOD BY AUTOMATED COUNT: 13 % (ref 11.6–15.4)
EST. AVERAGE GLUCOSE BLD GHB EST-MCNC: 166 MG/DL
GLOBULIN SER-MCNC: 2.1 G/DL (ref 1.5–4.5)
GLUCOSE SERPL-MCNC: 114 MG/DL (ref 70–99)
HBA1C MFR BLD: 7.4 % (ref 4.8–5.6)
HCT VFR BLD AUTO: 43.5 % (ref 37.5–51)
HGB BLD-MCNC: 15.1 G/DL (ref 13–17.7)
IMM GRANULOCYTES # BLD: 0 X10E3/UL (ref 0–0.1)
IMM GRANULOCYTES NFR BLD: 0 %
LYMPHOCYTES # BLD AUTO: 1.6 X10E3/UL (ref 0.7–3.1)
LYMPHOCYTES NFR BLD AUTO: 28 %
MCH RBC QN AUTO: 30.7 PG (ref 26.6–33)
MCHC RBC AUTO-ENTMCNC: 34.7 G/DL (ref 31.5–35.7)
MCV RBC AUTO: 88 FL (ref 79–97)
MONOCYTES # BLD AUTO: 0.7 X10E3/UL (ref 0.1–0.9)
MONOCYTES NFR BLD AUTO: 13 %
NEUTROPHILS # BLD AUTO: 3 X10E3/UL (ref 1.4–7)
NEUTROPHILS NFR BLD AUTO: 54 %
PLATELET # BLD AUTO: 261 X10E3/UL (ref 150–450)
POTASSIUM SERPL-SCNC: 4.8 MMOL/L (ref 3.5–5.2)
PROT SERPL-MCNC: 6.5 G/DL (ref 6–8.5)
RBC # BLD AUTO: 4.92 X10E6/UL (ref 4.14–5.8)
SODIUM SERPL-SCNC: 138 MMOL/L (ref 134–144)
TSH SERPL DL<=0.005 MIU/L-ACNC: 1.73 UIU/ML (ref 0.45–4.5)
WBC # BLD AUTO: 5.5 X10E3/UL (ref 3.4–10.8)

## 2023-07-05 NOTE — PROGRESS NOTES
Established Patient Progress Note     Chief Complaint   Patient presents with   • Diabetes Type 2        History of Present Illness:     Bridget Weathers is a 59 y.o. male with a history of T2DM since 1020 With complications of retinopathy, ED with Other PMHX of hypertension, hyperlipidemia who Presents today for diabetic care. he denies any polyuria, polydipsia, nocturia and blurry vision. he denies nephropathy and neuropathy. Retinopathy stable- follows with ophthalmology every 6 months now. This is transfer of care visit, all previous noted, labs and history reviewed for visit     Blood Sugar/Glucometer/Pump/CGM review:  Bridget Weathers   Device used [place] [address]    Indication   Type 2 Diabetes    More than 72 hours of data was reviewed. Report to be scanned to chart. Date Range: Dates in [facility] [place] is incorrect- says dec 2045  Analysis of data:   Average Glucose: 143  Coefficient of Variation: 28.4%    Time in Target Range: 80%  Time Above Range: 19%/0%   Time Below Range: 1%    Interpretation of data:-   BG overall in range     - patient reports BG have been good recently since mainly at home, however when he is travelling eats outside and also has cocktails and this causes BG to be high   Current regime:- Lantus 44u in AM and 32u in PM, CR 1:10 for each meal and 1:25 for scale.    Medications tried/failed in past:- metformin caused GI issues  Hypoglycemic episodes: None  Diet- is mindful of diet  Activity - stays active  Weight- stable     last eye exam- 04/23- stable b/l retinopathy  last foot exam- 07/23  History of hypertension- yes on lisinopril 10mg daily  History of hyperlipidemia yes on lipitor 40mg daily   Last lipid 03/23- LDL <100   Last urine 09/22- normal   Last hbA1C 07/23- 7.4%    Social/Family hx- noted in chart    Patient Active Problem List   Diagnosis   • Essential hypertension   • Hyperlipidemia associated with type 2 diabetes mellitus ([address])   • Type 2 diabetes mellitus with ophthalmic complication, with long-term current use of insulin (HCC)   • Mild nonproliferative diabetic retinopathy of both eyes without macular edema associated with type 2 diabetes mellitus ([address])   • Erectile dysfunction associated with type 2 diabetes mellitus ([address])   • Panic disorder   • Encounter for wellness examination      History reviewed. No pertinent past medical history.    Past Surgical History:   Procedure Laterality Date   • NO PAST SURGERIES        Family History   Adopted: Yes   Family history unknown: Yes     Social History     Tobacco Use   • Smoking status: Former     Packs/day: 1.00     Years: 20.00     Total pack years: 20.00     Types: Cigarettes     Quit date: 4/2/2008     Years since quitting: 15.2   • Smokeless tobacco: Never   Substance Use Topics   • Alcohol use: Not Currently     No Known Allergies    Current Outpatient Medications:   •  ascorbic acid (VITAMIN C) 500 mg tablet, Take 500 mg by mouth daily, Disp: , Rfl:   •  aspirin (ECOTRIN LOW STRENGTH) 81 mg EC tablet, Take 81 mg by mouth daily, Disp: , Rfl:   •  atorvastatin (LIPITOR) 40 mg tablet, Take 1 tablet (40 mg total) by mouth daily 1 tab daily, Disp: 30 tablet, Rfl: 11  •  cholecalciferol (VITAMIN D3) 1,000 units tablet, Take 1,000 Units by mouth daily, Disp: , Rfl:   •  Continuous Blood Gluc Sensor (FreeStyle Adry 14 Day Sensor) MISC, q 14 days, Disp: 2 each, Rfl: 11  •  imipramine (TOFRANIL) 50 mg tablet, TAKE ONE TABLET BY MOUTH EVERY DAY, Disp: 30 tablet, Rfl: 11  •  Insulin Glargine Solostar (Lantus SoloStar) 100 UNIT/ML SOPN, INJECT 44 UNITS UNDER THE SKIN IN THE MORNING AND 32 UNITS IN THE EVENING, Disp: 30 mL, Rfl: 2  •  Insulin Pen Needle (B-D UF III MINI PEN NEEDLES) 31G X 5 MM MISC, Use 5 needles daily, Disp: 500 each, Rfl: 3  •  lisinopril (ZESTRIL) 10 mg tablet, Take 1 tablet (10 mg total) by mouth daily 1 tab daily, Disp: 30 tablet, Rfl: 11  •  NovoLOG FlexPen 100 units/mL injection pen, INJECT UP TO 75 UNITS UNDER THE SKIN DAILY, Disp: 30 mL, Rfl: 6    Review of Systems   Constitutional: Negative for diaphoresis, fatigue and unexpected weight change. Respiratory: Negative for shortness of breath. Cardiovascular: Negative for chest pain and palpitations. Gastrointestinal: Negative for constipation and diarrhea. Endocrine: Negative for polydipsia and polyuria. Physical Exam:  Body mass index is 27.55 kg/m². /70   Pulse 88   Ht 5' 8" (1.727 m)   Wt 82.2 kg (181 lb 3.2 oz)   SpO2 98%   BMI 27.55 kg/m²    Wt Readings from Last 3 Encounters:   07/06/23 82.2 kg (181 lb 3.2 oz)   03/23/23 82.8 kg (182 lb 9.6 oz)   12/21/22 82.8 kg (182 lb 9.6 oz)       Physical Exam  Constitutional:       Appearance: Normal appearance. Cardiovascular:      Rate and Rhythm: Normal rate and regular rhythm. Pulses: Normal pulses. no weak pulses          Dorsalis pedis pulses are 2+ on the right side and 2+ on the left side. Pulmonary:      Effort: Pulmonary effort is normal.   Abdominal:      General: Abdomen is flat. Palpations: Abdomen is soft. Feet:      Right foot:      Skin integrity: No ulcer, skin breakdown, erythema, warmth, callus or dry skin. Left foot:      Skin integrity: No ulcer, skin breakdown, erythema, warmth, callus or dry skin. Skin:     General: Skin is warm. Capillary Refill: Capillary refill takes less than 2 seconds. Neurological:      General: No focal deficit present. Mental Status: He is alert. Patient's shoes and socks removed. Right Foot/Ankle   Right Foot Inspection  Skin Exam: skin normal and skin intact. No dry skin, no warmth, no callus, no erythema, no maceration, no abnormal color, no pre-ulcer, no ulcer and no callus. Toe Exam: ROM and strength within normal limits. Sensory   Vibration: intact  Monofilament testing: intact    Vascular  Capillary refills: < 3 seconds  The right DP pulse is 2+.      Left Foot/Ankle  Left Foot Inspection  Skin Exam: skin normal and skin intact. No dry skin, no warmth, no erythema, no maceration, normal color, no pre-ulcer, no ulcer and no callus. Toe Exam: ROM and strength within normal limits. Sensory   Vibration: intact  Monofilament testing: intact    Vascular  Capillary refills: < 3 seconds  The left DP pulse is 2+. Assign Risk Category  No deformity present  No loss of protective sensation  No weak pulses  Risk: 0       Labs:      Latest Reference Range & Units 06/10/22 11:33 09/09/22 09:34 12/14/22 08:18 03/17/23 09:19 07/03/23 08:52   Hemoglobin A1C 4.8 - 5.6 % 7.6 (H) 7.6 (H) 7.7 (H) 8.3 (H) 7.4 (H)   eAG, EST AVG Glucose mg/dL 171 171 174 192 166   (H): Data is abnormally high     Latest Reference Range & Units 04/20/21 10:01 09/01/21 08:50 09/09/22 09:34   EXT Creatinine Urine Not Estab. mg/dL 85.0 51.5 65.6   MICROALBUMIN/CREATININE RATIO 0 - 29 mg/g creat 12 <6 <5   MICROALBUM.,U,RANDOM Not Estab. ug/mL 10.6 <3.0 <3.0      11/14/18 12:40 04/05/23 00:00   Right Eye Diabetic Retinopathy Moderate (E) Positive (E)   (E): External lab result     11/14/18 12:40 04/05/23 00:00   Left Eye Diabetic Retinopathy Moderate (E) Positive (E)   (E): External lab result    Impression & Plan:    Problem List Items Addressed This Visit    None      No orders of the defined types were placed in this encounter. There are no Patient Instructions on file for this visit. Patient is a 62yM with T2DM for several years with complication of retinopathy, ED with other PMhx of hypertension, hyperlipdemia who presents today for follow up. 1) T2DM:- patients most recent HBA1C is still above goal at 7.4% however GMI on CGM shows 6.7%. this is possible d/t better diet while not travelling. Discussed working on improving accurate carb counting and also giving himself insulin for cocktails when travelling. Using CR 1:8 with meals when traveling vs 1:10 otherwise. Will not make any regime changes since BG at goal currently.     Plan C/w lantus 44u in AM and 32u in PM  Novolog CR 1:10 and ISF 1:25 when home and 1:8 when traveling   C/w CGM wear  - Labs in 3 months     Screening  uptodate with retinopathy, lipid and urine     2) hyperlipidemia:- LDL <100, c/w lipitor. Repeat next visit     3) Hypertension:- BP in clinic 128/70, c/w current regime   Discussed with the patient and all questioned fully answered. He will call me if any problems arise. Follow-up appointment in 3 months. Counseled patient on diagnostic results, prognosis, risk and benefit of treatment options, instruction for management, importance of treatment compliance, Risk  factor reduction and impressions    I have spent a total time of 40 minutes on 07/06/23 in caring for this patient including Diagnostic results, Prognosis, Risks and benefits of tx options, Instructions for management and Obtaining or reviewing history  .     Maggie Benavides MD

## 2023-07-06 ENCOUNTER — OFFICE VISIT (OUTPATIENT)
Dept: ENDOCRINOLOGY | Facility: HOSPITAL | Age: 58
End: 2023-07-06
Payer: COMMERCIAL

## 2023-07-06 VITALS
HEART RATE: 88 BPM | BODY MASS INDEX: 27.46 KG/M2 | DIASTOLIC BLOOD PRESSURE: 70 MMHG | SYSTOLIC BLOOD PRESSURE: 128 MMHG | WEIGHT: 181.2 LBS | OXYGEN SATURATION: 98 % | HEIGHT: 68 IN

## 2023-07-06 DIAGNOSIS — I10 ESSENTIAL HYPERTENSION: ICD-10-CM

## 2023-07-06 DIAGNOSIS — Z79.4 TYPE 2 DIABETES MELLITUS WITH RETINOPATHY, WITH LONG-TERM CURRENT USE OF INSULIN, MACULAR EDEMA PRESENCE UNSPECIFIED, UNSPECIFIED LATERALITY, UNSPECIFIED RETINOPATHY SEVERITY (HCC): ICD-10-CM

## 2023-07-06 DIAGNOSIS — E78.5 HYPERLIPIDEMIA, UNSPECIFIED HYPERLIPIDEMIA TYPE: ICD-10-CM

## 2023-07-06 DIAGNOSIS — E11.319 TYPE 2 DIABETES MELLITUS WITH RETINOPATHY, WITH LONG-TERM CURRENT USE OF INSULIN, MACULAR EDEMA PRESENCE UNSPECIFIED, UNSPECIFIED LATERALITY, UNSPECIFIED RETINOPATHY SEVERITY (HCC): ICD-10-CM

## 2023-07-06 PROCEDURE — 4010F ACE/ARB THERAPY RXD/TAKEN: CPT | Performed by: STUDENT IN AN ORGANIZED HEALTH CARE EDUCATION/TRAINING PROGRAM

## 2023-07-06 PROCEDURE — 95251 CONT GLUC MNTR ANALYSIS I&R: CPT | Performed by: STUDENT IN AN ORGANIZED HEALTH CARE EDUCATION/TRAINING PROGRAM

## 2023-07-06 PROCEDURE — 99215 OFFICE O/P EST HI 40 MIN: CPT | Performed by: STUDENT IN AN ORGANIZED HEALTH CARE EDUCATION/TRAINING PROGRAM

## 2023-07-06 RX ORDER — INSULIN GLARGINE 100 [IU]/ML
INJECTION, SOLUTION SUBCUTANEOUS
Qty: 30 ML | Refills: 2 | Status: SHIPPED | OUTPATIENT
Start: 2023-07-06

## 2023-07-06 RX ORDER — LISINOPRIL 10 MG/1
10 TABLET ORAL DAILY
Qty: 30 TABLET | Refills: 11 | Status: SHIPPED | OUTPATIENT
Start: 2023-07-06

## 2023-07-06 RX ORDER — ATORVASTATIN CALCIUM 40 MG/1
40 TABLET, FILM COATED ORAL DAILY
Qty: 30 TABLET | Refills: 11 | Status: SHIPPED | OUTPATIENT
Start: 2023-07-06

## 2023-07-06 NOTE — PROGRESS NOTES
Date diagnosis diabetes: around 1999    Diabetes with mild nonproliferative diabetic retinopathy, diabetic macular edema, nuclear sclerosis, erectile dysfunction,  complication.      Other PMHx: hyperlipidemia, hypertension, panic disorder     Current meds: long and short acting insulin     Meds tried and failed in the past: metformin, one other that was pulled from the market cannot find in EMR     Diet: better when not traveling, eating fruits, veggies, limiting carbs     Activity: active during job climbing ladders etc     Weight: no changes     Last eye exam: 4/5/23    Last foot exam: 10/17/ 2022    Hld:     Htn: normotensive at visit     ** patient starts to feel symptoms around BG 80s  Two nights in a row difficulty with sensor detected a flase low

## 2023-07-13 DIAGNOSIS — Z79.4 TYPE 2 DIABETES MELLITUS WITH RETINOPATHY, WITH LONG-TERM CURRENT USE OF INSULIN, MACULAR EDEMA PRESENCE UNSPECIFIED, UNSPECIFIED LATERALITY, UNSPECIFIED RETINOPATHY SEVERITY (HCC): ICD-10-CM

## 2023-07-13 DIAGNOSIS — E11.319 TYPE 2 DIABETES MELLITUS WITH RETINOPATHY, WITH LONG-TERM CURRENT USE OF INSULIN, MACULAR EDEMA PRESENCE UNSPECIFIED, UNSPECIFIED LATERALITY, UNSPECIFIED RETINOPATHY SEVERITY (HCC): ICD-10-CM

## 2023-07-13 RX ORDER — PEN NEEDLE, DIABETIC 31 GX3/16"
NEEDLE, DISPOSABLE MISCELLANEOUS
Qty: 500 EACH | Refills: 3 | Status: SHIPPED | OUTPATIENT
Start: 2023-07-13

## 2023-07-26 ENCOUNTER — TELEPHONE (OUTPATIENT)
Dept: FAMILY MEDICINE CLINIC | Facility: CLINIC | Age: 58
End: 2023-07-26

## 2023-07-26 NOTE — TELEPHONE ENCOUNTER
Pt needs referral for a [facility] [address] [phone]    Pt has appt for Aug 9th

## 2023-07-28 NOTE — TELEPHONE ENCOUNTER
Confirmation Titi Navarrete [id number]  Effective: 07/28/2023     Expires: 10/26/2023  Active  Referred From  [address]  Group WRA: [id number]  Provider NC: [id number]  Tax IZ: [id number]  [address] [address]  Referred To  [address]  Specialty:  Tier 2  Group QNK: [id number]  Provider EI: [id number]  Tax IX: [id number]  This referral is valid at any location for the above group  Patient Info  Ladoris Fleischer  [id number]  Male  1965  [address] [facility]  Clinical Information  Place of Service  Office  Service Type  Medical Care  Diagnosis  1H53.9 - unspecified visual disturbance  Procedures  [id number] - unlisted evaluation and management service  Notes  10 visits

## 2023-08-09 LAB
LEFT EYE DIABETIC RETINOPATHY: POSITIVE
RIGHT EYE DIABETIC RETINOPATHY: POSITIVE

## 2023-08-09 PROCEDURE — 2022F DILAT RTA XM EVC RTNOPTHY: CPT | Performed by: STUDENT IN AN ORGANIZED HEALTH CARE EDUCATION/TRAINING PROGRAM

## 2023-09-18 LAB — COLOGUARD RESULT REPORTABLE: POSITIVE

## 2023-09-25 ENCOUNTER — TELEPHONE (OUTPATIENT)
Dept: FAMILY MEDICINE CLINIC | Facility: CLINIC | Age: 58
End: 2023-09-25

## 2023-09-25 DIAGNOSIS — Z12.11 SCREENING FOR MALIGNANT NEOPLASM OF COLON: Primary | ICD-10-CM

## 2023-09-25 NOTE — TELEPHONE ENCOUNTER
Results -    LVM advising patient to call the office for test results and advice. Per Dr. Ashley Sellers - refer to GI.

## 2023-09-25 NOTE — TELEPHONE ENCOUNTER
----- Message from Eloy Simon MD sent at 9/22/2023 12:45 PM EDT -----  Regarding: Positive Cologuard  Patient should be referred to GI for colonoscopy.  ----- Message -----  From: Ji Sadler  Sent: 9/18/2023   6:17 PM EDT  To: Eloy Simon MD

## 2023-09-27 ENCOUNTER — TELEPHONE (OUTPATIENT)
Dept: FAMILY MEDICINE CLINIC | Facility: CLINIC | Age: 58
End: 2023-09-27

## 2023-09-27 DIAGNOSIS — Z79.4 TYPE 2 DIABETES MELLITUS WITH RETINOPATHY, WITH LONG-TERM CURRENT USE OF INSULIN, MACULAR EDEMA PRESENCE UNSPECIFIED, UNSPECIFIED LATERALITY, UNSPECIFIED RETINOPATHY SEVERITY (HCC): Primary | ICD-10-CM

## 2023-09-27 DIAGNOSIS — E11.319 TYPE 2 DIABETES MELLITUS WITH RETINOPATHY, WITH LONG-TERM CURRENT USE OF INSULIN, MACULAR EDEMA PRESENCE UNSPECIFIED, UNSPECIFIED LATERALITY, UNSPECIFIED RETINOPATHY SEVERITY (HCC): Primary | ICD-10-CM

## 2023-09-27 NOTE — TELEPHONE ENCOUNTER
Referral Request to [facility]      Confirmation - Referral [id number]  Effective: 09/27/2023     Expires: 12/26/2023  Active  Referred From  [address]  Specialty: Family Practice  Group NPI: [id number]  Provider ID: [id number]  Tax ID: [id number]  [facility], [address]  [facility]  Specialty:  Tier 2  Group NPI: [id number]  Provider ID: [id number]  Tax ID: [id number]  This referral is valid at any location for the above group  Patient Info  Qasim Castellanos  [id number]  Male  1965  [address], [address] [address]  Clinical Information  Place of Service  Office  Service Type  Medical Care  Diagnosis  1R68.89 - other general symptoms and signs  2Z12.11 - encounter for screening for malignant neoplasm of colon  Procedures  [id number] - unlisted evaluation and management service  Notes  3 visits

## 2023-10-02 ENCOUNTER — CONSULT (OUTPATIENT)
Dept: GASTROENTEROLOGY | Facility: CLINIC | Age: 58
End: 2023-10-02
Payer: COMMERCIAL

## 2023-10-02 ENCOUNTER — TELEPHONE (OUTPATIENT)
Dept: GASTROENTEROLOGY | Facility: CLINIC | Age: 58
End: 2023-10-02

## 2023-10-02 VITALS
WEIGHT: 180 LBS | DIASTOLIC BLOOD PRESSURE: 86 MMHG | BODY MASS INDEX: 25.77 KG/M2 | HEIGHT: 70 IN | SYSTOLIC BLOOD PRESSURE: 152 MMHG

## 2023-10-02 DIAGNOSIS — R12 PYROSIS: ICD-10-CM

## 2023-10-02 DIAGNOSIS — K59.00 CONSTIPATION IN MALE: ICD-10-CM

## 2023-10-02 DIAGNOSIS — R19.5 POSITIVE COLORECTAL CANCER SCREENING USING COLOGUARD TEST: Primary | ICD-10-CM

## 2023-10-02 DIAGNOSIS — Z12.11 SCREENING FOR MALIGNANT NEOPLASM OF COLON: ICD-10-CM

## 2023-10-02 PROCEDURE — 99203 OFFICE O/P NEW LOW 30 MIN: CPT | Performed by: PHYSICIAN ASSISTANT

## 2023-10-02 RX ORDER — POLYETHYLENE GLYCOL 3350, SODIUM SULFATE ANHYDROUS, SODIUM BICARBONATE, SODIUM CHLORIDE, POTASSIUM CHLORIDE 236; 22.74; 6.74; 5.86; 2.97 G/4L; G/4L; G/4L; G/4L; G/4L
4000 POWDER, FOR SOLUTION ORAL ONCE
Qty: 4000 ML | Refills: 0 | Status: SHIPPED | OUTPATIENT
Start: 2023-10-02 | End: 2023-10-02

## 2023-10-02 NOTE — PATIENT INSTRUCTIONS
-Proceed with endoscopy and colonoscopy.   -Try Benefiber twice daily if not enough can add Colace and MiraLAX both up to twice daily for constipation  -Antireflux regiment reviewed can continue Tums or Pepcid as needed  -Follow-up pending procedures

## 2023-10-02 NOTE — PROGRESS NOTES
[address] [place] [address] [facility] - Outpatient Consultation  Thea Rodriguez 59 y.o. male MRN: [id number]  Encounter: [id number]    ASSESSMENT AND PLAN:    1. Screening for malignant neoplasm of colon  2. Positive colorectal cancer screening using Cologuard test  I did recommend a colonoscopy for colorectal cancer screening and to evaluate + cologuard. I reviewed the indications, risks, benefits and alternatives of a colonoscopy and the patient is willing to proceed. - Ambulatory Referral to Gastroenterology  - Colonoscopy; Future  - polyethylene glycol (Golytely) 4000 mL solution; Take 4,000 mL by mouth once for 1 dose Take 4000 mL by mouth once for 1 dose. Use as directed  Dispense: 4000 mL; Refill: 0    3. Constipation in male  He has chronic constipation. Recent TSH normal.  Advised to get plenty of fluid and fiber in his diet start Benefiber twice daily. If not enough can add Colace up to twice daily and MiraLAX up to twice daily. 4. Pyrosis  Chronic reflux improved with Tums this happens 3d/w for over 5 years. He was offered an endoscopy to screen for gastritis and Childress's as he has multiple risk factors (middle-age white male former smoker with chronic reflux). Patient agreeable. I offered to order Pepcid or PPI but he declined and wanted to wait until endoscopy results. An antireflux regimen and lifestyle modifications were reviewed. - EGD; Future      Follow up Appointment: EGD/COLON    Chief Complaint   Patient presents with   • colon consult/ failed OA-constipation       HPI:     Thea Rodriguez is a 59y.o. year old male with a PMH significant for HTN, HLD, DM being evaluated at the kind request of Dr. Rosa Garcia for colonoscopy for screening given positive Cologuard and for constipation. Never had EGD/COLON, Pt adopted, does not know FH.     7/2023 CBC, CMP, TSH WNL. A1c 7.4.   9/2023 cologuard +.      Patient having daily bowel movements which are occasionally hard with straining, this has been chronic. He tried increasing dietary fiber without improvement. Denies GI bleeding. Has had acid reflux 3 days a week for over 5 years mildly improved with Tums. Uses NSAIDs infrequently. Eating well and weight stable. Pt denies all other GI and constitutional symptoms. ROS:   Constitutional: denies fatigue, fever. HEENT: denies visual disturbance, postnasal drip, sore throat. Respiratory: denies cough, shortness of breath. Cardiovascular: denies chest pain, leg swelling. Gastrointestinal: as noted above in HPI. : denies difficulty urinating, dysuria. Musculoskeletal: denies arthralgias, back pain. Neurological: denies dizziness, syncope. Psychiatric: denies confusion, anxiety.     Historical Information   Past Medical History:   Diagnosis Date   • Diabetes ([address])      Past Surgical History:   Procedure Laterality Date   • CATARACT EXTRACTION, BILATERAL     • NO PAST SURGERIES       Social History     Substance and Sexual Activity   Alcohol Use Yes    Comment: 1/ month     Social History     Substance and Sexual Activity   Drug Use No     Social History     Tobacco Use   Smoking Status Former   • Packs/day: 1.00   • Years: 20.00   • Total pack years: 20.00   • Types: Cigarettes   • Quit date: 4/2/2008   • Years since quitting: 15.5   Smokeless Tobacco Never     Family History   Adopted: Yes   Family history unknown: Yes       Meds/Allergies     Current Outpatient Medications:   •  ascorbic acid (VITAMIN C) 500 mg tablet  •  aspirin (ECOTRIN LOW STRENGTH) 81 mg EC tablet  •  atorvastatin (LIPITOR) 40 mg tablet  •  cholecalciferol (VITAMIN D3) 1,000 units tablet  •  Continuous Blood Gluc Sensor (FreeStyle [place]ry 14 Day Sensor) MISC  •  imipramine (TOFRANIL) 50 mg tablet  •  Insulin Glargine Solostar (Lantus SoloStar) 100 UNIT/ML SOPN  •  Insulin Pen Needle ([employer] Unifine Pentips Plus) 31G X 5 MM MISC  •  lisinopril (ZESTRIL) 10 mg tablet  •  NovoLOG FlexPen 100 units/mL injection pen  •  polyethylene glycol (Golytely) 4000 mL solution    No Known Allergies    PHYSICAL EXAM:    Blood pressure 152/86, height 5' 10" (1.778 m), weight 81.6 kg (180 lb). Body mass index is 25.83 kg/m². Constitutional: Well-developed, no acute distress  HEENT: normocephalic, mucous membranes moist.  Neck: Supple  Skin: warm and dry  Respiratory: Lungs are clear to auscultation B/L. Cardiovascular: Heart is regular rate and rhythm. Gastrointestinal: Soft, nontender, nondistended with normal active bowel sounds. No masses, guarding, rebound. Rectal Exam: Deferred. Integumentary: Warm and dry  Extremities: No edema. Neurologic: Nonfocal. A & O ×3. Psychiatric: Normal affect. Lab Results:   As above    Radiology Results:   No results found. Patient expressed understanding and had all questions and concerns addressed. Advised patient to call with any questions, failure to improve, or if symptoms change or worsen. Teresa Hinojosa PA-C  10/02/23  10:36 AM    This chart was completed in part utilizing [employer] speech voice recognition software. Random word insertions, pronoun errors, and incomplete sentences are an occasional consequence of this system due to software limitations, and ambient noise. Any questions or concerns about the content, text, or information contained within the body of this dictation should be directly addressed to the provider for clarification.

## 2023-10-02 NOTE — TELEPHONE ENCOUNTER
Scheduled date of combo (as of today):  11-2-23  Physician performing colonoscopy:Willie  Location of colonoscopy:[facility]  Bowel prep reviewed with patient:Musa  Instructions reviewed with patient by:nilo  Clearances: no   wife  Ly Nayana [phone]

## 2023-10-05 LAB
ALBUMIN SERPL-MCNC: 4.6 G/DL (ref 3.8–4.9)
ALBUMIN/CREAT UR: <9 MG/G CREAT (ref 0–29)
ALBUMIN/GLOB SERPL: 2 {RATIO} (ref 1.2–2.2)
ALP SERPL-CCNC: 69 IU/L (ref 44–121)
ALT SERPL-CCNC: 24 IU/L (ref 0–44)
AST SERPL-CCNC: 19 IU/L (ref 0–40)
BILIRUB SERPL-MCNC: 0.5 MG/DL (ref 0–1.2)
BUN SERPL-MCNC: 18 MG/DL (ref 8–27)
BUN/CREAT SERPL: 16 (ref 10–24)
CALCIUM SERPL-MCNC: 9.6 MG/DL (ref 8.6–10.2)
CHLORIDE SERPL-SCNC: 101 MMOL/L (ref 96–106)
CHOLEST SERPL-MCNC: 139 MG/DL (ref 100–199)
CHOLEST/HDLC SERPL: 3 RATIO (ref 0–5)
CO2 SERPL-SCNC: 26 MMOL/L (ref 20–29)
CREAT SERPL-MCNC: 1.15 MG/DL (ref 0.76–1.27)
CREAT UR-MCNC: 34.2 MG/DL
EGFR: 73 ML/MIN/1.73
EST. AVERAGE GLUCOSE BLD GHB EST-MCNC: 169 MG/DL
GLOBULIN SER-MCNC: 2.3 G/DL (ref 1.5–4.5)
GLUCOSE SERPL-MCNC: 122 MG/DL (ref 70–99)
HBA1C MFR BLD: 7.5 % (ref 4.8–5.6)
HDLC SERPL-MCNC: 46 MG/DL
LDLC SERPL CALC-MCNC: 74 MG/DL (ref 0–99)
MICROALBUMIN UR-MCNC: <3 UG/ML
POTASSIUM SERPL-SCNC: 4.9 MMOL/L (ref 3.5–5.2)
PROT SERPL-MCNC: 6.9 G/DL (ref 6–8.5)
SL AMB VLDL CHOLESTEROL CALC: 19 MG/DL (ref 5–40)
SODIUM SERPL-SCNC: 138 MMOL/L (ref 134–144)
TRIGL SERPL-MCNC: 103 MG/DL (ref 0–149)

## 2023-10-12 ENCOUNTER — OFFICE VISIT (OUTPATIENT)
Dept: ENDOCRINOLOGY | Facility: HOSPITAL | Age: 58
End: 2023-10-12
Payer: COMMERCIAL

## 2023-10-12 VITALS
SYSTOLIC BLOOD PRESSURE: 140 MMHG | WEIGHT: 180.4 LBS | HEART RATE: 92 BPM | HEIGHT: 70 IN | BODY MASS INDEX: 25.83 KG/M2 | DIASTOLIC BLOOD PRESSURE: 80 MMHG

## 2023-10-12 DIAGNOSIS — E11.69 HYPERLIPIDEMIA ASSOCIATED WITH TYPE 2 DIABETES MELLITUS: ICD-10-CM

## 2023-10-12 DIAGNOSIS — Z79.4 TYPE 2 DIABETES MELLITUS WITH RETINOPATHY, WITH LONG-TERM CURRENT USE OF INSULIN, MACULAR EDEMA PRESENCE UNSPECIFIED, UNSPECIFIED LATERALITY, UNSPECIFIED RETINOPATHY SEVERITY (HCC): Primary | ICD-10-CM

## 2023-10-12 DIAGNOSIS — I10 ESSENTIAL HYPERTENSION: ICD-10-CM

## 2023-10-12 DIAGNOSIS — E11.319 TYPE 2 DIABETES MELLITUS WITH RETINOPATHY, WITH LONG-TERM CURRENT USE OF INSULIN, MACULAR EDEMA PRESENCE UNSPECIFIED, UNSPECIFIED LATERALITY, UNSPECIFIED RETINOPATHY SEVERITY (HCC): Primary | ICD-10-CM

## 2023-10-12 DIAGNOSIS — E11.3293 MILD NONPROLIFERATIVE DIABETIC RETINOPATHY OF BOTH EYES WITHOUT MACULAR EDEMA ASSOCIATED WITH TYPE 2 DIABETES MELLITUS (HCC): ICD-10-CM

## 2023-10-12 DIAGNOSIS — E78.5 HYPERLIPIDEMIA ASSOCIATED WITH TYPE 2 DIABETES MELLITUS: ICD-10-CM

## 2023-10-12 PROCEDURE — 95251 CONT GLUC MNTR ANALYSIS I&R: CPT | Performed by: NURSE PRACTITIONER

## 2023-10-12 PROCEDURE — 99214 OFFICE O/P EST MOD 30 MIN: CPT | Performed by: NURSE PRACTITIONER

## 2023-10-12 RX ORDER — INSULIN ASPART 100 [IU]/ML
INJECTION, SOLUTION INTRAVENOUS; SUBCUTANEOUS
Qty: 30 ML | Refills: 6 | Status: SHIPPED | OUTPATIENT
Start: 2023-10-12

## 2023-10-12 RX ORDER — INSULIN GLARGINE 100 [IU]/ML
INJECTION, SOLUTION SUBCUTANEOUS
Qty: 30 ML | Refills: 3 | Status: SHIPPED | OUTPATIENT
Start: 2023-10-12

## 2023-10-12 NOTE — PATIENT INSTRUCTIONS
Be mindful of diet. Exercise regularly and stay hydrated. Continue NovoLog at current dose. Continue morning Levemir 44 units and 32 units in the evening. When traveling and eating larger dinners, please utilize a 1:8 insulin to carbohydrate ratio. Make sure you are injecting insulin consistently. Continue to utilize the freestyle [place], please call for a new . Contact the office with any consistent hypoglycemia. Continue lisinopril. Continue atorvastatin. Obtain lab work prior to next visit.

## 2023-10-12 NOTE — PROGRESS NOTES
Marisol Sy 59 y.o. male MRN: [id number]    Encounter: [id number]      Assessment/Plan     Assessment: This is a 59y.o.-year-old male with type 2 diabetes with retinopathy, hypertension and hyperlipidemia. Plan:  1. 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: His most recent hemoglobin A1c is 7.5. Freestyle [employer] was not available at the time of the visit due to a  issue. 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 For now, continue current regimen. Did discuss adjusting his insulin to carbohydrate ratio at dinnertime when he is traveling to 1:8 as he typically eats larger meals and experiences hyperglycemia after dinner and throughout the evening. He will continue to utilize the freestyle [place] and for the report to the office in 2 weeks for review and further adjustment, if necessary. Check hemoglobin A1c prior to next visit. 2.  Hypertension:  Continue lisinopril. Check comprehensive metabolic panel prior to next visit. 3.  Hyperlipidemia: Stable. Continue atorvastatin. CC: Type 2 Diabetes follow up    History of Present Illness     HPI:  59y.o. year old male with type 2 diabetes for approximately 20 years. He is on insulin at home and takes Lantus 32 units in the evening and 44 units in the morning with NovoLog 1 unit for every 10 g of carbs with breakfast, lunch, dinner and snack. Her most recent hemoglobin A1c from October 4, 2023 is 7.5. He states that his blood sugar readings have been high at times throughout the fall as he has been traveling for work and has been eating out more with snacking. He denies any recent episodes of hypoglycemia, polyuria, polydipsia, nocturia and blurry vision. He denies neuropathy but does admit to neuropathy and retinopathy. He obtained his annual diabetic eye exam in Spring 2021, by his report. He has mild retinopathy.  He saw his retina specialist in February 2022. He is getting injections to treat retinopathy. He complains of some discomfort to his feet intermittently but does not follow podiatry for regular diabetic foot care. Diabetic foot exam was performed at last office visit on July 6, 2023. Blood Sugar/Glucometer/Pump/CGM review:  Download of his personal roel Rowley Mantle was unreliable due to date range issues. For his hypertension, he is treated with lisinopril 10 mg daily. He states his compliance with his lisinopril has improved. His hyperlipidemia is treated with atorvastatin 40 mg daily. Review of Systems   Constitutional: Negative. Negative for chills, fatigue and fever. HENT:  Positive for hearing loss ([place]). Negative for trouble swallowing and voice change. Eyes:  Negative for photophobia, pain, discharge, redness, itching and visual disturbance. Respiratory:  Negative for cough and shortness of breath. Cardiovascular:  Negative for chest pain and palpitations. Gastrointestinal:  Negative for abdominal pain, constipation, diarrhea, nausea and vomiting. Endocrine: Negative for cold intolerance, heat intolerance, polydipsia, polyphagia and polyuria. Genitourinary: Negative. Musculoskeletal: Negative. Skin: Negative. Allergic/Immunologic: Negative. Neurological:  Negative for dizziness, syncope, light-headedness and headaches. Hematological: Negative. Psychiatric/Behavioral: Negative. All other systems reviewed and are negative.       Historical Information   Past Medical History:   Diagnosis Date    Diabetes [facility])      Past Surgical History:   Procedure Laterality Date    CATARACT EXTRACTION, BILATERAL      NO PAST SURGERIES       Social History   Social History     Substance and Sexual Activity   Alcohol Use Yes    Comment: 1/ month     Social History     Substance and Sexual Activity   Drug Use No     Social History     Tobacco Use   Smoking Status Former    Packs/day: 1.00    Years: 20.00 Total pack years: 20.00    Types: Cigarettes    Quit date: 4/2/2008    Years since quitting: 15.5   Smokeless Tobacco Never     Family History:   Family History   Adopted: Yes   Family history unknown: Yes       Meds/Allergies   Current Outpatient Medications   Medication Sig Dispense Refill    ascorbic acid (VITAMIN C) 500 mg tablet Take 500 mg by mouth daily      aspirin (ECOTRIN LOW STRENGTH) 81 mg EC tablet Take 81 mg by mouth daily      atorvastatin (LIPITOR) 40 mg tablet Take 1 tablet (40 mg total) by mouth daily 1 tab daily 30 tablet 11    cholecalciferol (VITAMIN D3) 1,000 units tablet Take 1,000 Units by mouth daily      Continuous Blood Gluc Sensor (FreeStyle Adry 14 Day Sensor) MISC q 14 days 2 each 11    imipramine (TOFRANIL) 50 mg tablet TAKE ONE TABLET BY MOUTH EVERY DAY 30 tablet 11    Insulin Glargine Solostar (Lantus SoloStar) 100 UNIT/ML SOPN INJECT 44 UNITS UNDER THE SKIN IN THE MORNING AND 32 UNITS IN THE EVENING 30 mL 2    Insulin Pen Needle ([employer]One Unifine Pentips Plus) 31G X 5 MM MISC USE 5 NEEDLES DAILY 500 each 3    lisinopril (ZESTRIL) 10 mg tablet Take 1 tablet (10 mg total) by mouth daily 1 tab daily 30 tablet 11    NovoLOG FlexPen 100 units/mL injection pen INJECT UP TO 75 UNITS UNDER THE SKIN DAILY 30 mL 6    polyethylene glycol (Golytely) 4000 mL solution Take 4,000 mL by mouth once for 1 dose Take 4000 mL by mouth once for 1 dose. Use as directed 4000 mL 0     No current facility-administered medications for this visit. No Known Allergies    Objective   Vitals: There were no vitals taken for this visit. Physical Exam  Vitals reviewed. Constitutional:       Appearance: He is well-developed. HENT:      Head: Normocephalic and atraumatic. Nose: Nose normal.   Eyes:      Conjunctiva/sclera: Conjunctivae normal.      Pupils: Pupils are equal, round, and reactive to light. Cardiovascular:      Rate and Rhythm: Normal rate and regular rhythm.       Heart sounds: Normal heart sounds. Pulmonary:      Effort: Pulmonary effort is normal.      Breath sounds: Normal breath sounds. Abdominal:      General: Bowel sounds are normal.      Palpations: Abdomen is soft. Musculoskeletal:         General: Normal range of motion. Cervical back: Normal range of motion and neck supple. Skin:     General: Skin is warm and dry. Neurological:      Mental Status: He is alert and oriented to person, place, and time. Psychiatric:         Behavior: Behavior normal.         Thought Content:  Thought content normal.         Judgment: Judgment normal.       Lab Results:   Lab Results   Component Value Date/Time    Hemoglobin A1C 7.5 (H) 10/04/2023 09:35 AM    Hemoglobin A1C 7.4 (H) 07/03/2023 08:52 AM    Hemoglobin A1C 8.3 (H) 03/17/2023 09:19 AM    White Blood Cell Count 5.5 07/03/2023 08:52 AM    White Blood Cell Count 5.3 03/17/2023 09:19 AM    White Blood Cell Count 8.7 12/14/2022 08:18 AM    Hemoglobin 15.1 07/03/2023 08:52 AM    Hemoglobin 14.6 03/17/2023 09:19 AM    Hemoglobin 14.4 12/14/2022 08:18 AM    HCT 43.5 07/03/2023 08:52 AM    HCT 42.6 03/17/2023 09:19 AM    HCT 42.7 12/14/2022 08:18 AM    MCV 88 07/03/2023 08:52 AM    MCV 89 03/17/2023 09:19 AM    MCV 89 12/14/2022 08:18 AM    Platelet Count 590 56/81/2369 08:52 AM    Platelet Count 974 50/16/6452 09:19 AM    Platelet Count 720 44/57/6521 08:18 AM    BUN 18 10/04/2023 09:35 AM    BUN 18 07/03/2023 08:52 AM    BUN 14 03/17/2023 09:19 AM    Potassium 4.9 10/04/2023 09:35 AM    Potassium 4.8 07/03/2023 08:52 AM    Potassium 4.5 03/17/2023 09:19 AM    Chloride 101 10/04/2023 09:35 AM    Chloride 101 07/03/2023 08:52 AM    Chloride 98 03/17/2023 09:19 AM    CO2 26 10/04/2023 09:35 AM    CO2 23 07/03/2023 08:52 AM    CO2 25 03/17/2023 09:19 AM    Creatinine 1.15 10/04/2023 09:35 AM    Creatinine 1.14 07/03/2023 08:52 AM    Creatinine 1.07 03/17/2023 09:19 AM    AST 19 10/04/2023 09:35 AM    AST 26 07/03/2023 08:52 AM    AST 37 03/17/2023 09:19 AM    ALT 24 10/04/2023 09:35 AM    ALT 25 07/03/2023 08:52 AM    ALT 36 03/17/2023 09:19 AM    Protein, Total 6.9 10/04/2023 09:35 AM    Protein, Total 6.5 07/03/2023 08:52 AM    Protein, Total 6.9 03/17/2023 09:19 AM    Albumin 4.6 10/04/2023 09:35 AM    Albumin 4.4 07/03/2023 08:52 AM    Albumin 4.6 03/17/2023 09:19 AM    Globulin, Total 2.3 10/04/2023 09:35 AM    Globulin, Total 2.1 07/03/2023 08:52 AM    Globulin, Total 2.3 03/17/2023 09:19 AM    HDL 46 10/04/2023 09:35 AM    HDL 41 03/17/2023 09:19 AM    Triglycerides 103 10/04/2023 09:35 AM    Triglycerides 96 03/17/2023 09:19 AM     Portions of the record may have been created with voice recognition software. Occasional wrong word or "sound a like" substitutions may have occurred due to the inherent limitations of voice recognition software. Read the chart carefully and recognize, using context, where substitutions have occurred.

## 2023-10-22 ENCOUNTER — ANESTHESIA (OUTPATIENT)
Dept: ANESTHESIOLOGY | Facility: AMBULATORY SURGERY CENTER | Age: 58
End: 2023-10-22

## 2023-10-22 ENCOUNTER — ANESTHESIA EVENT (OUTPATIENT)
Dept: ANESTHESIOLOGY | Facility: AMBULATORY SURGERY CENTER | Age: 58
End: 2023-10-22

## 2023-10-30 ENCOUNTER — OFFICE VISIT (OUTPATIENT)
Dept: FAMILY MEDICINE CLINIC | Facility: HOSPITAL | Age: 58
End: 2023-10-30
Payer: COMMERCIAL

## 2023-10-30 VITALS
BODY MASS INDEX: 25.86 KG/M2 | WEIGHT: 180.6 LBS | TEMPERATURE: 97.2 F | SYSTOLIC BLOOD PRESSURE: 136 MMHG | HEART RATE: 94 BPM | OXYGEN SATURATION: 99 % | DIASTOLIC BLOOD PRESSURE: 74 MMHG | HEIGHT: 70 IN

## 2023-10-30 DIAGNOSIS — R19.5 POSITIVE COLORECTAL CANCER SCREENING USING COLOGUARD TEST: ICD-10-CM

## 2023-10-30 DIAGNOSIS — E11.319 TYPE 2 DIABETES MELLITUS WITH RETINOPATHY, WITH LONG-TERM CURRENT USE OF INSULIN, MACULAR EDEMA PRESENCE UNSPECIFIED, UNSPECIFIED LATERALITY, UNSPECIFIED RETINOPATHY SEVERITY (HCC): Primary | ICD-10-CM

## 2023-10-30 DIAGNOSIS — Z12.5 PROSTATE CANCER SCREENING: ICD-10-CM

## 2023-10-30 DIAGNOSIS — E11.3293 MILD NONPROLIFERATIVE DIABETIC RETINOPATHY OF BOTH EYES WITHOUT MACULAR EDEMA ASSOCIATED WITH TYPE 2 DIABETES MELLITUS (HCC): ICD-10-CM

## 2023-10-30 DIAGNOSIS — I10 ESSENTIAL HYPERTENSION: ICD-10-CM

## 2023-10-30 DIAGNOSIS — Z12.5 SCREENING FOR MALIGNANT NEOPLASM OF PROSTATE: ICD-10-CM

## 2023-10-30 DIAGNOSIS — Z00.00 ANNUAL PHYSICAL EXAM: ICD-10-CM

## 2023-10-30 DIAGNOSIS — E78.5 HYPERLIPIDEMIA ASSOCIATED WITH TYPE 2 DIABETES MELLITUS: ICD-10-CM

## 2023-10-30 DIAGNOSIS — E11.69 HYPERLIPIDEMIA ASSOCIATED WITH TYPE 2 DIABETES MELLITUS: ICD-10-CM

## 2023-10-30 DIAGNOSIS — Z79.4 TYPE 2 DIABETES MELLITUS WITH RETINOPATHY, WITH LONG-TERM CURRENT USE OF INSULIN, MACULAR EDEMA PRESENCE UNSPECIFIED, UNSPECIFIED LATERALITY, UNSPECIFIED RETINOPATHY SEVERITY (HCC): Primary | ICD-10-CM

## 2023-10-30 PROCEDURE — 99214 OFFICE O/P EST MOD 30 MIN: CPT | Performed by: NURSE PRACTITIONER

## 2023-10-30 PROCEDURE — 3725F SCREEN DEPRESSION PERFORMED: CPT | Performed by: NURSE PRACTITIONER

## 2023-10-30 PROCEDURE — 3075F SYST BP GE 130 - 139MM HG: CPT | Performed by: NURSE PRACTITIONER

## 2023-10-30 PROCEDURE — 99396 PREV VISIT EST AGE 40-64: CPT | Performed by: NURSE PRACTITIONER

## 2023-10-30 PROCEDURE — 3078F DIAST BP <80 MM HG: CPT | Performed by: NURSE PRACTITIONER

## 2023-10-30 NOTE — ASSESSMENT & PLAN NOTE
Lab Results   Component Value Date    HGBA1C 7.5 (H) 10/04/2023   Managed by endo. UTD with eye, foot exams. Urine microalbumin UTD. Labs per endo  F/U in 6 months.

## 2023-10-30 NOTE — PROGRESS NOTES
[address] [facility]  PRIMARY CARE [address]     NAME: Ehsan Diaz  AGE: 59 y.o. SEX: male  : 1965     DATE: 10/30/2023     Assessment and Plan:     Problem List Items Addressed This Visit          Endocrine    Hyperlipidemia associated with type 2 diabetes mellitus        Lab Results   Component Value Date    HGBA1C 7.5 (H) 10/04/2023   On statin. FLP excellent control         Type 2 diabetes mellitus with ophthalmic complication, with long-term current use of insulin ([address]) - Primary       Lab Results   Component Value Date    HGBA1C 7.5 (H) 10/04/2023   Managed by endo. UTD with eye, foot exams. Urine microalbumin UTD. Labs per endo  F/U in 6 months. 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 Continue on current regimen. F/U in 6 months. Other Visit Diagnoses       Annual physical exam        Positive colorectal cancer screening using Cologuard test        Relevant Orders    Ambulatory Referral to Gastroenterology    Screening for malignant neoplasm of prostate        Prostate cancer screening        Relevant Orders    PSA Total (Reflex To Free)            Immunizations and preventive care screenings were discussed with patient today. Appropriate education was printed on patient's after visit summary. Discussed risks and benefits of prostate cancer screening. We discussed the controversial history of PSA screening for prostate cancer in the [facility] as well as the risk of over detection and over treatment of prostate cancer by way of PSA screening.   The patient understands that PSA blood testing is an imperfect way to screen for prostate cancer and that elevated PSA levels in the blood may also be caused by infection, inflammation, prostatic trauma or manipulation, urological procedures, or by benign prostatic enlargement. The role of the digital rectal examination in prostate cancer screening was also discussed and I discussed with him that there is large interobserver variability in the findings of digital rectal examination. Counseling:  Alcohol/drug use: discussed moderation in alcohol intake, the recommendations for healthy alcohol use, and avoidance of illicit drug use. Dental Health: discussed importance of regular tooth brushing, flossing, and dental visits. Injury prevention: discussed safety/seat belts, safety helmets, smoke detectors, carbon dioxide detectors, and smoking near bedding or upholstery. Sexual health: discussed sexually transmitted diseases, partner selection, use of condoms, avoidance of unintended pregnancy, and contraceptive alternatives. Exercise: the importance of regular exercise/physical activity was discussed. Recommend exercise 3-5 times per week for at least 30 minutes. BMI Counseling: Body mass index is 25.91 kg/m². The BMI is above normal. Nutrition recommendations include decreasing portion sizes, encouraging healthy choices of fruits and vegetables, decreasing fast food intake, consuming healthier snacks, limiting drinks that contain sugar, moderation in carbohydrate intake and reducing intake of saturated and trans fat. Exercise recommendations include moderate physical activity 150 minutes/week. No pharmacotherapy was ordered. Rationale for BMI follow-up plan is due to patient being overweight or obese. Depression Screening and Follow-up Plan: Patient was screened for depression during today's encounter. They screened negative with a PHQ-2 score of 0. Return in 6 months (on 4/30/2024).      Chief Complaint:     Chief Complaint   Patient presents with    Establish Care      History of Present Illness:     Adult Annual Physical   Patient here for a comprehensive physical exam. The patient reports no problems. Diet and Physical Activity  Diet/Nutrition: well balanced diet. Exercise: no formal exercise. Active at work     Depression Screening  PHQ-2/9 Depression Screening    Little interest or pleasure in doing things: 0 - not at all  Feeling down, depressed, or hopeless: 0 - not at all  PHQ-2 Score: 0  PHQ-2 Interpretation: Negative depression screen       General Health  Sleep: sleeps well and gets 4-6 hours of sleep on average. Hearing: normal - bilateral.  Vision: goes for regular eye exams. Dental: no dental visits for >1 year. No teeth     Health  Symptoms include: none    Advanced Care Planning  Do you have an advanced directive? yes  Do you have a durable medical power of ? yes     Review of Systems:     Review of Systems   Constitutional: Negative. Negative for activity change, appetite change, fatigue, fever and unexpected weight change. HENT:  Positive for tinnitus (chronic). Negative for congestion, dental problem, drooling, ear discharge, ear pain, facial swelling, hearing loss, mouth sores, nosebleeds, postnasal drip, rhinorrhea, sinus pressure, sinus pain, sneezing, sore throat, trouble swallowing and voice change. Eyes: Negative. Negative for visual disturbance. Respiratory: Negative. Negative for cough, chest tightness, shortness of breath and wheezing. Cardiovascular: Negative. Negative for chest pain, palpitations and leg swelling. Gastrointestinal: Negative. Negative for abdominal pain, blood in stool, constipation, diarrhea, nausea, rectal pain and vomiting. Endocrine: Negative. Genitourinary: Negative. Negative for decreased urine volume, difficulty urinating, dysuria, frequency and urgency. Musculoskeletal: Negative. Negative for arthralgias and myalgias. Skin: Negative. Negative for rash and wound. Neurological: Negative. Negative for dizziness, weakness, light-headedness, numbness and headaches. Hematological: Negative. Psychiatric/Behavioral: Negative. Negative for sleep disturbance. The patient is not nervous/anxious.        Past Medical History:     Past Medical History:   Diagnosis Date    Diabetes ([address])       Past Surgical History:     Past Surgical History:   Procedure Laterality Date    CATARACT EXTRACTION, BILATERAL      NO PAST SURGERIES        Family History:     Family History   Adopted: Yes   Family history unknown: Yes      Social History:     Social History     Socioeconomic History    Marital status: /Civil Union     Spouse name: None    Number of children: None    Years of education: None    Highest education level: None   Occupational History    None   Tobacco Use    Smoking status: Former     Packs/day: 1.00     Years: 20.00     Total pack years: 20.00     Types: Cigarettes     Quit date: 4/2/2008     Years since quitting: 15.5    Smokeless tobacco: Never   Vaping Use    Vaping Use: Never used   Substance and Sexual Activity    Alcohol use: Yes     Comment: 1/ month    Drug use: No    Sexual activity: None   Other Topics Concern    None   Social History Narrative    None     Social Determinants of Health     Financial Resource Strain: Not on file   Food Insecurity: Not on file   Transportation Needs: Not on file   Physical Activity: Not on file   Stress: Not on file   Social Connections: Not on file   Intimate Partner Violence: Not on file   Housing Stability: Not on file      Current Medications:     Current Outpatient Medications   Medication Sig Dispense Refill    ascorbic acid (VITAMIN C) 500 mg tablet Take 500 mg by mouth daily      aspirin (ECOTRIN LOW STRENGTH) 81 mg EC tablet Take 81 mg by mouth daily      atorvastatin (LIPITOR) 40 mg tablet Take 1 tablet (40 mg total) by mouth daily 1 tab daily 30 tablet 11    cholecalciferol (VITAMIN D3) 1,000 units tablet Take 1,000 Units by mouth daily Continuous Blood Gluc Sensor (FreeStyle Adry 14 Day Sensor) MISC q 14 days 2 each 11    imipramine (TOFRANIL) 50 mg tablet TAKE ONE TABLET BY MOUTH EVERY DAY 30 tablet 11    Insulin Glargine Solostar (Lantus SoloStar) 100 UNIT/ML SOPN INJECT 44 UNITS UNDER THE SKIN IN THE MORNING AND 32 UNITS IN THE EVENING 30 mL 3    Insulin Pen Needle (CareOne Unifine Pentips Plus) 31G X 5 MM MISC USE 5 NEEDLES DAILY 500 each 3    lisinopril (ZESTRIL) 10 mg tablet Take 1 tablet (10 mg total) by mouth daily 1 tab daily 30 tablet 11    NovoLOG FlexPen 100 units/mL injection pen INJECT UP TO 75 UNITS UNDER THE SKIN DAILY 30 mL 6    polyethylene glycol (Golytely) 4000 mL solution Take 4,000 mL by mouth once for 1 dose Take 4000 mL by mouth once for 1 dose. Use as directed 4000 mL 0     No current facility-administered medications for this visit. Allergies:     No Known Allergies   Physical Exam:     /74 (BP Location: Left arm, Patient Position: Sitting, Cuff Size: Standard)   Pulse 94   Temp (!) 97.2 °F (36.2 °C) (Tympanic)   Ht 5' 10" (1.778 m)   Wt 81.9 kg (180 lb 9.6 oz)   SpO2 99%   BMI 25.91 kg/m²     Physical Exam  Vitals reviewed. Constitutional:       Appearance: Normal appearance. He is normal weight. HENT:      Head: Normocephalic and atraumatic. Right Ear: Tympanic membrane, ear canal and external ear normal.      Left Ear: Tympanic membrane, ear canal and external ear normal.      Nose: Nose normal.      Mouth/Throat:      Mouth: Mucous membranes are moist.      Pharynx: Oropharynx is clear. Eyes:      Conjunctiva/sclera: Conjunctivae normal.      Pupils: Pupils are equal, round, and reactive to light. Neck:      Vascular: No carotid bruit. Cardiovascular:      Rate and Rhythm: Normal rate and regular rhythm. Heart sounds: Normal heart sounds. No murmur heard. Pulmonary:      Effort: Pulmonary effort is normal.      Breath sounds: Normal breath sounds.    Abdominal:      General: Abdomen is flat. Bowel sounds are normal.      Palpations: Abdomen is soft. There is no hepatomegaly or splenomegaly. Tenderness: There is no abdominal tenderness. Musculoskeletal:         General: Normal range of motion. Cervical back: Normal range of motion and neck supple. Skin:     General: Skin is warm and dry. Capillary Refill: Capillary refill takes less than 2 seconds. Neurological:      General: No focal deficit present. Mental Status: He is alert and oriented to person, place, and time. Psychiatric:         Mood and Affect: Mood normal.         Behavior: Behavior normal.         Thought Content:  Thought content normal.         Judgment: Judgment normal.          Genna Osler, [address]

## 2023-10-30 NOTE — PROGRESS NOTES
Assessment/Plan:    Type 2 diabetes mellitus with ophthalmic complication, with long-term current use of insulin (HCC)    Lab Results   Component Value Date    HGBA1C 7.5 (H) 10/04/2023   Managed by endo. UTD with eye, foot exams. Urine microalbumin UTD. Labs per endo  F/U in 6 months. 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 Continue on current regimen. F/U in 6 months. Hyperlipidemia associated with type 2 diabetes mellitus ([address])    Lab Results   Component Value Date    HGBA1C 7.5 (H) 10/04/2023   On statin. FLP excellent control       Diagnoses and all orders for this visit:    Type 2 diabetes mellitus with retinopathy, with long-term current use of insulin, macular edema presence unspecified, unspecified laterality, unspecified retinopathy severity (HCC)    Mild nonproliferative diabetic retinopathy of both eyes without macular edema associated with type 2 diabetes mellitus ([address])    Essential hypertension    Hyperlipidemia associated with type 2 diabetes mellitus     Annual physical exam    Positive colorectal cancer screening using Cologuard test  -     Ambulatory Referral to Gastroenterology; Future    Screening for malignant neoplasm of prostate    Prostate cancer screening  -     PSA Total (Reflex To Free); Future  -     PSA Total (Reflex To Free)          Subjective:      Patient ID: Bryn Snell is a 59 y.o. male. New pt transferring from DR. Ashraf's office. H/I DM2. He follows with endo. Has retinopathy and follows with ophthalmology. H/o cataract surgery. Recent positive cologuard. Having EGD and colonoscopy this week.            The following portions of the patient's history were reviewed and updated as appropriate: allergies, current medications, past family history, past medical history, past social history, past surgical history and problem list.    Review of Systems   Constitutional: Negative. Negative for activity change, appetite change, fatigue, fever and unexpected weight change. HENT: Negative. Negative for congestion, ear pain, sinus pressure, sinus pain, sore throat, trouble swallowing and voice change. Eyes: Negative. Negative for visual disturbance. Respiratory: Negative. Negative for cough, chest tightness, shortness of breath and wheezing. Cardiovascular: Negative. Negative for chest pain, palpitations and leg swelling. Gastrointestinal: Negative. Negative for abdominal pain, blood in stool, constipation, diarrhea, nausea, rectal pain and vomiting. Endocrine: Negative. Genitourinary: Negative. Negative for decreased urine volume, difficulty urinating, dysuria, frequency and urgency. Musculoskeletal: Negative. Negative for arthralgias and myalgias. Skin: Negative. Negative for rash and wound. Allergic/Immunologic: Negative. Neurological: Negative. Negative for dizziness, weakness, light-headedness, numbness and headaches. Hematological: Negative. Psychiatric/Behavioral: Negative. Negative for sleep disturbance. The patient is not nervous/anxious. Objective:  Vitals:    10/30/23 1516   BP: 136/74   Pulse: 94   Temp: (!) 97.2 °F (36.2 °C)   SpO2: 99%      Physical Exam  Vitals reviewed. Constitutional:       Appearance: Normal appearance. He is well-developed. HENT:      Head: Normocephalic and atraumatic. Right Ear: Tympanic membrane, ear canal and external ear normal.      Left Ear: Tympanic membrane, ear canal and external ear normal.      Nose: Nose normal.      Mouth/Throat:      Mouth: Mucous membranes are moist.      Pharynx: Oropharynx is clear. No oropharyngeal exudate. Eyes:      Conjunctiva/sclera: Conjunctivae normal.      Pupils: Pupils are equal, round, and reactive to light. Neck:      Thyroid: No thyromegaly. Vascular: No carotid bruit. Cardiovascular:      Rate and Rhythm: Normal rate and regular rhythm. Heart sounds: Normal heart sounds. No murmur heard. Pulmonary:      Effort: Pulmonary effort is normal.      Breath sounds: Normal breath sounds. Abdominal:      General: Abdomen is flat. Bowel sounds are normal.      Palpations: Abdomen is soft. There is no hepatomegaly or splenomegaly. Tenderness: There is no abdominal tenderness. Musculoskeletal:         General: Normal range of motion. Cervical back: Normal range of motion and neck supple. Lymphadenopathy:      Cervical: No cervical adenopathy. Skin:     General: Skin is warm and dry. Capillary Refill: Capillary refill takes less than 2 seconds. Neurological:      General: No focal deficit present. Mental Status: He is alert and oriented to person, place, and time. Deep Tendon Reflexes: Reflexes normal.   Psychiatric:         Mood and Affect: Mood normal.         Behavior: Behavior normal.         Thought Content:  Thought content normal.         Judgment: Judgment normal.

## 2023-10-30 NOTE — PATIENT INSTRUCTIONS
Wellness Visit for Adults   AMBULATORY CARE:   A wellness visit  is when you see your healthcare provider to get screened for health problems. Your healthcare provider will also give you advice on how to stay healthy. Write down your questions so you remember to ask them. Ask your healthcare provider how often you should have a wellness visit. What happens at a wellness visit:  Your healthcare provider will ask about your health, and your family history of health problems. This includes high blood pressure, heart disease, and cancer. He or she will ask if you have symptoms that concern you, if you smoke, and about your mood. You may also be asked about your intake of medicines, supplements, food, and alcohol. Any of the following may be done: Your weight  will be checked. Your height may also be checked so your body mass index (BMI) can be calculated. Your BMI shows if you are at a healthy weight. Your blood pressure  and heart rate will be checked. Your temperature may also be checked. Blood and urine tests  may be done. Blood tests may be done to check your cholesterol levels. Abnormal cholesterol levels increase your risk for heart disease and stroke. You may also need a blood or urine test to check for diabetes if you are at increased risk. Urine tests may be done to look for signs of an infection or kidney disease. A physical exam  includes checking your heartbeat and lungs with a stethoscope. Your healthcare provider may also check your skin to look for sun damage. Screening tests  may be recommended. A screening test is done to check for diseases that may not cause symptoms. The screening tests you may need depend on your age, gender, family history, and lifestyle habits. For example, colorectal screening may be recommended if you are 48years old or older. Screening tests you need if you are a woman:   A Pap smear  is used to screen for cervical cancer.  Pap smears are usually done every 3 to 5 years depending on your age. You may need them more often if you have had abnormal Pap smear test results in the past. Ask your healthcare provider how often you should have a Pap smear. A mammogram  is an x-ray of your breasts to screen for breast cancer. Experts recommend mammograms every 2 years starting at age 48 years. You may need a mammogram at age 52 years or younger if you have an increased risk for breast cancer. Talk to your healthcare provider about when you should start having mammograms and how often you need them. Vaccines you may need:   Get an influenza vaccine  every year. The influenza vaccine protects you from the flu. Several types of viruses cause the flu. The viruses change over time, so new vaccines are made each year. Get a tetanus-diphtheria (Td) booster vaccine  every 10 years. This vaccine protects you against tetanus and diphtheria. Tetanus is a severe infection that may cause painful muscle spasms and lockjaw. Diphtheria is a severe bacterial infection that causes a thick covering in the back of your mouth and throat. Get a human papillomavirus (HPV) vaccine  if you are female and aged 23 to 32 or male 23 to 24 and never received it. This vaccine protects you from HPV infection. HPV is the most common infection spread by sexual contact. HPV may also cause vaginal, penile, and anal cancers. Get a pneumococcal vaccine  if you are aged 72 years or older. The pneumococcal vaccine is an injection given to protect you from pneumococcal disease. Pneumococcal disease is an infection caused by pneumococcal bacteria. The infection may cause pneumonia, meningitis, or an ear infection. Get a shingles vaccine  if you are 60 or older, even if you have had shingles before. The shingles vaccine is an injection to protect you from the varicella-zoster virus. This is the same virus that causes chickenpox.  Shingles is a painful rash that develops in people who had chickenpox or have been exposed to the virus. How to eat healthy:  My Plate is a model for planning healthy meals. It shows the types and amounts of foods that should go on your plate. Fruits and vegetables make up about half of your plate, and grains and protein make up the other half. A serving of dairy is included on the side of your plate. The amount of calories and serving sizes you need depends on your age, gender, weight, and height. Examples of healthy foods are listed below:  Eat a variety of vegetables  such as dark green, red, and orange vegetables. You can also include canned vegetables low in sodium (salt) and frozen vegetables without added butter or sauces. Eat a variety of fresh fruits , canned fruit in 100% juice, frozen fruit, and dried fruit. Include whole grains. At least half of the grains you eat should be whole grains. Examples include whole-wheat bread, wheat pasta, brown rice, and whole-grain cereals such as oatmeal.    Eat a variety of protein foods such as seafood (fish and shellfish), lean meat, and poultry without skin (turkey and chicken). Examples of lean meats include pork leg, shoulder, or tenderloin, and beef round, sirloin, tenderloin, and extra lean ground beef. Other protein foods include eggs and egg substitutes, beans, peas, soy products, nuts, and seeds. Choose low-fat dairy products such as skim or 1% milk or low-fat yogurt, cheese, and cottage cheese. Limit unhealthy fats  such as butter, hard margarine, and shortening. Exercise:  Exercise at least 30 minutes per day on most days of the week. Some examples of exercise include walking, biking, dancing, and swimming. You can also fit in more physical activity by taking the stairs instead of the elevator or parking farther away from stores. Include muscle strengthening activities 2 days each week. Regular exercise provides many health benefits.  It helps you manage your weight, and decreases your risk for type 2 diabetes, heart disease, stroke, and high blood pressure. Exercise can also help improve your mood. Ask your healthcare provider about the best exercise plan for you. General health and safety guidelines:   Do not smoke. Nicotine and other chemicals in cigarettes and cigars can cause lung damage. Ask your healthcare provider for information if you currently smoke and need help to quit. E-cigarettes or smokeless tobacco still contain nicotine. Talk to your healthcare provider before you use these products. Limit alcohol. A drink of alcohol is 12 ounces of beer, 5 ounces of wine, or 1½ ounces of liquor. Lose weight, if needed. Being overweight increases your risk of certain health conditions. These include heart disease, high blood pressure, type 2 diabetes, and certain types of cancer. Protect your skin. Do not sunbathe or use tanning beds. Use sunscreen with a SPF 15 or higher. Apply sunscreen at least 15 minutes before you go outside. Reapply sunscreen every 2 hours. Wear protective clothing, hats, and sunglasses when you are outside. Drive safely. Always wear your seatbelt. Make sure everyone in your car wears a seatbelt. A seatbelt can save your life if you are in an accident. Do not use your cell phone when you are driving. This could distract you and cause an accident. Pull over if you need to make a call or send a text message. Practice safe sex. Use latex condoms if are sexually active and have more than one partner. Your healthcare provider may recommend screening tests for sexually transmitted infections (STIs). Wear helmets, lifejackets, and protective gear. Always wear a helmet when you ride a bike or motorcycle, go skiing, or play sports that could cause a head injury. Wear protective equipment when you play sports. Wear a lifejacket when you are on a boat or doing water sports.     © Copyright [employer] 2023 Information is for End User's use only and may not be sold, redistributed or otherwise used for commercial purposes. The above information is an  only. It is not intended as medical advice for individual conditions or treatments. Talk to your doctor, nurse or pharmacist before following any medical regimen to see if it is safe and effective for you.

## 2023-10-30 NOTE — ASSESSMENT & PLAN NOTE
Lab Results   Component Value Date    HGBA1C 7.5 (H) 10/04/2023   On statin.    FLP excellent control

## 2023-11-02 ENCOUNTER — HOSPITAL ENCOUNTER (OUTPATIENT)
Dept: GASTROENTEROLOGY | Facility: AMBULATORY SURGERY CENTER | Age: 58
Discharge: HOME | End: 2023-11-02
Payer: COMMERCIAL

## 2023-11-02 ENCOUNTER — ANESTHESIA EVENT (OUTPATIENT)
Dept: GASTROENTEROLOGY | Facility: AMBULATORY SURGERY CENTER | Age: 58
End: 2023-11-02

## 2023-11-02 ENCOUNTER — ANESTHESIA (OUTPATIENT)
Dept: GASTROENTEROLOGY | Facility: AMBULATORY SURGERY CENTER | Age: 58
End: 2023-11-02

## 2023-11-02 VITALS
OXYGEN SATURATION: 100 % | TEMPERATURE: 97.4 F | WEIGHT: 180 LBS | HEART RATE: 88 BPM | BODY MASS INDEX: 25.77 KG/M2 | DIASTOLIC BLOOD PRESSURE: 54 MMHG | RESPIRATION RATE: 20 BRPM | HEIGHT: 70 IN | SYSTOLIC BLOOD PRESSURE: 112 MMHG

## 2023-11-02 DIAGNOSIS — E11.69 ERECTILE DYSFUNCTION ASSOCIATED WITH TYPE 2 DIABETES MELLITUS: ICD-10-CM

## 2023-11-02 DIAGNOSIS — E11.3293 MILD NONPROLIFERATIVE DIABETIC RETINOPATHY OF BOTH EYES WITHOUT MACULAR EDEMA ASSOCIATED WITH TYPE 2 DIABETES MELLITUS (HCC): ICD-10-CM

## 2023-11-02 DIAGNOSIS — R12 PYROSIS: ICD-10-CM

## 2023-11-02 DIAGNOSIS — Z12.11 SCREENING FOR MALIGNANT NEOPLASM OF COLON: ICD-10-CM

## 2023-11-02 DIAGNOSIS — I10 ESSENTIAL HYPERTENSION: ICD-10-CM

## 2023-11-02 DIAGNOSIS — E11.69 HYPERLIPIDEMIA ASSOCIATED WITH TYPE 2 DIABETES MELLITUS: ICD-10-CM

## 2023-11-02 DIAGNOSIS — K22.9 NODULE OF ESOPHAGUS: ICD-10-CM

## 2023-11-02 DIAGNOSIS — N52.1 ERECTILE DYSFUNCTION ASSOCIATED WITH TYPE 2 DIABETES MELLITUS: ICD-10-CM

## 2023-11-02 DIAGNOSIS — K22.70 BARRETT'S ESOPHAGUS DETERMINED BY ENDOSCOPY: Primary | ICD-10-CM

## 2023-11-02 DIAGNOSIS — E78.5 HYPERLIPIDEMIA ASSOCIATED WITH TYPE 2 DIABETES MELLITUS: ICD-10-CM

## 2023-11-02 DIAGNOSIS — F41.0 PANIC DISORDER: ICD-10-CM

## 2023-11-02 PROCEDURE — 88305 TISSUE EXAM BY PATHOLOGIST: CPT | Performed by: PATHOLOGY

## 2023-11-02 PROCEDURE — 43239 EGD BIOPSY SINGLE/MULTIPLE: CPT | Performed by: INTERNAL MEDICINE

## 2023-11-02 PROCEDURE — G0121 COLON CA SCRN NOT HI RSK IND: HCPCS | Performed by: INTERNAL MEDICINE

## 2023-11-02 RX ORDER — SODIUM CHLORIDE, SODIUM LACTATE, POTASSIUM CHLORIDE, CALCIUM CHLORIDE 600; 310; 30; 20 MG/100ML; MG/100ML; MG/100ML; MG/100ML
50 INJECTION, SOLUTION INTRAVENOUS CONTINUOUS
Status: DISCONTINUED | OUTPATIENT
Start: 2023-11-02 | End: 2023-11-06 | Stop reason: HOSPADM

## 2023-11-02 RX ORDER — OMEPRAZOLE 40 MG/1
40 CAPSULE, DELAYED RELEASE ORAL DAILY
Qty: 90 CAPSULE | Refills: 3 | Status: SHIPPED | OUTPATIENT
Start: 2023-11-02 | End: 2024-10-27

## 2023-11-02 RX ORDER — PROPOFOL 10 MG/ML
INJECTION, EMULSION INTRAVENOUS AS NEEDED
Status: DISCONTINUED | OUTPATIENT
Start: 2023-11-02 | End: 2023-11-02

## 2023-11-02 RX ORDER — LIDOCAINE HYDROCHLORIDE 20 MG/ML
INJECTION, SOLUTION EPIDURAL; INFILTRATION; INTRACAUDAL; PERINEURAL AS NEEDED
Status: DISCONTINUED | OUTPATIENT
Start: 2023-11-02 | End: 2023-11-02

## 2023-11-02 RX ADMIN — PROPOFOL 50 MG: 10 INJECTION, EMULSION INTRAVENOUS at 14:06

## 2023-11-02 RX ADMIN — PROPOFOL 50 MG: 10 INJECTION, EMULSION INTRAVENOUS at 14:08

## 2023-11-02 RX ADMIN — PROPOFOL 50 MG: 10 INJECTION, EMULSION INTRAVENOUS at 14:18

## 2023-11-02 RX ADMIN — SODIUM CHLORIDE, SODIUM LACTATE, POTASSIUM CHLORIDE, CALCIUM CHLORIDE 50 ML/HR: 600; 310; 30; 20 INJECTION, SOLUTION INTRAVENOUS at 13:43

## 2023-11-02 RX ADMIN — PROPOFOL 50 MG: 10 INJECTION, EMULSION INTRAVENOUS at 14:30

## 2023-11-02 RX ADMIN — PROPOFOL 50 MG: 10 INJECTION, EMULSION INTRAVENOUS at 14:12

## 2023-11-02 RX ADMIN — PROPOFOL 50 MG: 10 INJECTION, EMULSION INTRAVENOUS at 14:22

## 2023-11-02 RX ADMIN — PROPOFOL 150 MG: 10 INJECTION, EMULSION INTRAVENOUS at 14:02

## 2023-11-02 RX ADMIN — PROPOFOL 50 MG: 10 INJECTION, EMULSION INTRAVENOUS at 14:25

## 2023-11-02 RX ADMIN — PROPOFOL 50 MG: 10 INJECTION, EMULSION INTRAVENOUS at 14:38

## 2023-11-02 RX ADMIN — LIDOCAINE HYDROCHLORIDE 100 MG: 20 INJECTION, SOLUTION EPIDURAL; INFILTRATION; INTRACAUDAL; PERINEURAL at 14:02

## 2023-11-02 RX ADMIN — PROPOFOL 50 MG: 10 INJECTION, EMULSION INTRAVENOUS at 14:03

## 2023-11-02 NOTE — H&P
History and Physical - [facility]  Saul Pérez 59 y.o. male MRN: [id number]    HPI: Saul Pérez is a 59 y.o. male who presents for EGD and colonoscopy for pyrosis and positive Cologuard    REVIEW OF SYSTEMS: Per the HPI, and otherwise unremarkable.     Historical Information   Past Medical History:   Diagnosis Date    Diabetes ([address] [facility])     type 2    GERD (gastroesophageal reflux disease)     Hypertension      Past Surgical History:   Procedure Laterality Date    CATARACT EXTRACTION, BILATERAL      NO PAST SURGERIES       Social History   Social History     Substance and Sexual Activity   Alcohol Use Yes    Comment: 1/ month     Social History     Substance and Sexual Activity   Drug Use No     Social History     Tobacco Use   Smoking Status Former    Packs/day: 1.00    Years: 20.00    Total pack years: 20.00    Types: Cigarettes    Quit date: 4/2/2008    Years since quitting: 15.5   Smokeless Tobacco Never     Family History   Adopted: Yes   Family history unknown: Yes       Meds/Allergies       Current Outpatient Medications:     Continuous Blood Gluc Sensor (DataRoseStyle Adry 14 Day Sensor) MISC    imipramine (TOFRANIL) 50 mg tablet    Insulin Glargine Solostar (Lantus SoloStar) 100 UNIT/ML SOPN    Insulin Pen Needle (CareOne Unifine Pentips Plus) 31G X 5 MM MISC    lisinopril (ZESTRIL) 10 mg tablet    ascorbic acid (VITAMIN C) 500 mg tablet    aspirin (ECOTRIN LOW STRENGTH) 81 mg EC tablet    atorvastatin (LIPITOR) 40 mg tablet    cholecalciferol (VITAMIN D3) 1,000 units tablet    NovoLOG FlexPen 100 units/mL injection pen    polyethylene glycol (Golytely) 4000 mL solution    Current Facility-Administered Medications:     lactated ringers infusion, 50 mL/hr, Intravenous, Continuous, 50 mL/hr at 11/02/23 1343    No Known Allergies    Objective     /76   Pulse 90   Temp (!) 97.4 °F (36.3 °C) (Temporal)   Resp 22   Ht 5' 10" (1.778 m)   Wt 81.6 kg (180 lb)   SpO2 100%   BMI 25.83 kg/m²     PHYSICAL EXAM    Gen: NAD AAOx3  Head: Normocephalic, Atraumatic  CV: S1S2 RRR no m/r/g  CHEST: Clear b/l no c/r/w  ABD: soft, +BS NT/ND  EXT: no edema    ASSESSMENT/PLAN:  This is a 59y.o. year old male here for EGD and colonoscopy, and he is stable and optimized for his procedure.

## 2023-11-02 NOTE — ANESTHESIA POSTPROCEDURE EVALUATION
Post-Op Assessment Note    CV Status:  Stable  Pain Score: 0    Pain management: adequate     Mental Status:  Alert and awake   Hydration Status:  Euvolemic   PONV Controlled:  Controlled   Airway Patency:  Patent      Post Op Vitals Reviewed: Yes      Staff: Anesthesiologist, CRNA         No notable events documented.     BP   127/60   Temp     Pulse  94   Resp   16   SpO2   98

## 2023-11-02 NOTE — ANESTHESIA PREPROCEDURE EVALUATION
Procedure:  COLONOSCOPY  EGD    Relevant Problems   CARDIO   (+) Essential hypertension   (+) Hyperlipidemia associated with type 2 diabetes mellitus       ENDO   (+) Type 2 diabetes mellitus with ophthalmic complication, with long-term current use of insulin (HCC)      NEURO/PSYCH   (+) Panic disorder   Last [id number] on 10/4/23     Physical Exam    Airway    Mallampati score: II  TM Distance: >3 FB  Neck ROM: full     Dental    lower dentures and upper dentures    Cardiovascular      Pulmonary      Other Findings        Anesthesia Plan  ASA Score- 2     Anesthesia Type- IV sedation with anesthesia with ASA Monitors. Additional Monitors:     Airway Plan:            Plan Factors-Exercise tolerance (METS): >4 METS. Chart reviewed. Patient summary reviewed. Patient is not a current smoker. Induction- intravenous. Postoperative Plan-     Informed Consent- Anesthetic plan and risks discussed with patient. I personally reviewed this patient with the CRNA. Discussed and agreed on the Anesthesia Plan with the CRNA. Jenna Arreaga

## 2023-11-06 ENCOUNTER — TELEPHONE (OUTPATIENT)
Dept: GASTROENTEROLOGY | Facility: CLINIC | Age: 58
End: 2023-11-06

## 2023-11-06 NOTE — TELEPHONE ENCOUNTER
Left message for patient to call back to schedule banding EMR, EGD in the OR with Dr Gertrude Bhatia.

## 2023-11-06 NOTE — TELEPHONE ENCOUNTER
----- Message from Ofelia Cartwright MD sent at 11/2/2023  3:34 PM EDT -----  Regarding: Banding EMR  Hello could we get this patient scheduled for a banding EMR, upper endoscopy with Dr. Kieran Diaz at [facility]. Would need to be scheduled for a 1 hour block in the OR. Dx would be esophageal nodule. Thank you!

## 2023-11-06 NOTE — TELEPHONE ENCOUNTER
----- Message from Azra Skelton MD sent at 11/2/2023  3:34 PM EDT -----  Regarding: Banding EMR  Hello could we get this patient scheduled for a banding EMR, upper endoscopy with Dr. Radha Escobar at [facility]. Would need to be scheduled for a 1 hour block in the OR. Dx would be esophageal nodule. Thank you!

## 2023-11-07 DIAGNOSIS — K22.9 NODULE OF ESOPHAGUS: ICD-10-CM

## 2023-11-07 PROCEDURE — 88305 TISSUE EXAM BY PATHOLOGIST: CPT | Performed by: PATHOLOGY

## 2023-11-07 NOTE — TELEPHONE ENCOUNTER
Spoke to patient to schedule banding EMR at [address] [place] [address]. There is availability with Dr Valeria Colbert on 11/21/2023. He stated that he didn't know anything about needing any procedures and asked to have a call back to discuss why it is necessary .

## 2023-11-09 NOTE — TELEPHONE ENCOUNTER
Scheduled date of banding EMR EGD(as of today):11/21/2023  Physician performing banding EMR/EGD: Dr Kary Renteria  Location of Banding EMR/EGD: [facility]  Instructions reviewed with patient by:  Clearances: none

## 2023-11-21 ENCOUNTER — HOSPITAL ENCOUNTER (OUTPATIENT)
Dept: PERIOP | Facility: HOSPITAL | Age: 58
Setting detail: OUTPATIENT SURGERY
Discharge: HOME | End: 2023-11-21
Attending: INTERNAL MEDICINE
Payer: COMMERCIAL

## 2023-11-21 ENCOUNTER — ANESTHESIA (OUTPATIENT)
Dept: PERIOP | Facility: HOSPITAL | Age: 58
End: 2023-11-21

## 2023-11-21 ENCOUNTER — ANESTHESIA EVENT (OUTPATIENT)
Dept: PERIOP | Facility: HOSPITAL | Age: 58
End: 2023-11-21

## 2023-11-21 VITALS
RESPIRATION RATE: 16 BRPM | OXYGEN SATURATION: 100 % | SYSTOLIC BLOOD PRESSURE: 133 MMHG | DIASTOLIC BLOOD PRESSURE: 67 MMHG | HEART RATE: 78 BPM | TEMPERATURE: 98.1 F

## 2023-11-21 DIAGNOSIS — K22.9 NODULE OF ESOPHAGUS: ICD-10-CM

## 2023-11-21 LAB
GLUCOSE SERPL-MCNC: 135 MG/DL (ref 65–140)
GLUCOSE SERPL-MCNC: 142 MG/DL (ref 65–140)

## 2023-11-21 PROCEDURE — 88313 SPECIAL STAINS GROUP 2: CPT | Performed by: PATHOLOGY

## 2023-11-21 PROCEDURE — 88342 IMHCHEM/IMCYTCHM 1ST ANTB: CPT | Performed by: PATHOLOGY

## 2023-11-21 PROCEDURE — 88305 TISSUE EXAM BY PATHOLOGIST: CPT | Performed by: PATHOLOGY

## 2023-11-21 PROCEDURE — 88312 SPECIAL STAINS GROUP 1: CPT | Performed by: PATHOLOGY

## 2023-11-21 PROCEDURE — 82948 REAGENT STRIP/BLOOD GLUCOSE: CPT

## 2023-11-21 RX ORDER — HYDROMORPHONE HCL IN WATER/PF 6 MG/30 ML
0.2 PATIENT CONTROLLED ANALGESIA SYRINGE INTRAVENOUS
Status: DISCONTINUED | OUTPATIENT
Start: 2023-11-21 | End: 2023-11-21 | Stop reason: HOSPADM

## 2023-11-21 RX ORDER — ONDANSETRON 2 MG/ML
4 INJECTION INTRAMUSCULAR; INTRAVENOUS ONCE AS NEEDED
Status: DISCONTINUED | OUTPATIENT
Start: 2023-11-21 | End: 2023-11-21 | Stop reason: HOSPADM

## 2023-11-21 RX ORDER — SUCCINYLCHOLINE/SOD CL,ISO/PF 100 MG/5ML
SYRINGE (ML) INTRAVENOUS AS NEEDED
Status: DISCONTINUED | OUTPATIENT
Start: 2023-11-21 | End: 2023-11-21

## 2023-11-21 RX ORDER — PROPOFOL 10 MG/ML
INJECTION, EMULSION INTRAVENOUS AS NEEDED
Status: DISCONTINUED | OUTPATIENT
Start: 2023-11-21 | End: 2023-11-21

## 2023-11-21 RX ORDER — ESMOLOL HYDROCHLORIDE 10 MG/ML
INJECTION INTRAVENOUS AS NEEDED
Status: DISCONTINUED | OUTPATIENT
Start: 2023-11-21 | End: 2023-11-21

## 2023-11-21 RX ORDER — FENTANYL CITRATE/PF 50 MCG/ML
25 SYRINGE (ML) INJECTION
Status: DISCONTINUED | OUTPATIENT
Start: 2023-11-21 | End: 2023-11-21 | Stop reason: HOSPADM

## 2023-11-21 RX ORDER — FENTANYL CITRATE 50 UG/ML
INJECTION, SOLUTION INTRAMUSCULAR; INTRAVENOUS AS NEEDED
Status: DISCONTINUED | OUTPATIENT
Start: 2023-11-21 | End: 2023-11-21

## 2023-11-21 RX ORDER — LIDOCAINE HYDROCHLORIDE 10 MG/ML
INJECTION, SOLUTION EPIDURAL; INFILTRATION; INTRACAUDAL; PERINEURAL AS NEEDED
Status: DISCONTINUED | OUTPATIENT
Start: 2023-11-21 | End: 2023-11-21

## 2023-11-21 RX ORDER — ONDANSETRON 2 MG/ML
INJECTION INTRAMUSCULAR; INTRAVENOUS AS NEEDED
Status: DISCONTINUED | OUTPATIENT
Start: 2023-11-21 | End: 2023-11-21

## 2023-11-21 RX ORDER — SODIUM CHLORIDE, SODIUM LACTATE, POTASSIUM CHLORIDE, CALCIUM CHLORIDE 600; 310; 30; 20 MG/100ML; MG/100ML; MG/100ML; MG/100ML
INJECTION, SOLUTION INTRAVENOUS CONTINUOUS PRN
Status: DISCONTINUED | OUTPATIENT
Start: 2023-11-21 | End: 2023-11-21

## 2023-11-21 RX ORDER — DIPHENHYDRAMINE HYDROCHLORIDE 50 MG/ML
12.5 INJECTION INTRAMUSCULAR; INTRAVENOUS ONCE AS NEEDED
Status: DISCONTINUED | OUTPATIENT
Start: 2023-11-21 | End: 2023-11-21 | Stop reason: HOSPADM

## 2023-11-21 RX ADMIN — SODIUM CHLORIDE, SODIUM LACTATE, POTASSIUM CHLORIDE, AND CALCIUM CHLORIDE: .6; .31; .03; .02 INJECTION, SOLUTION INTRAVENOUS at 08:52

## 2023-11-21 RX ADMIN — ESMOLOL HYDROCHLORIDE 20 MG: 10 INJECTION, SOLUTION INTRAVENOUS at 09:00

## 2023-11-21 RX ADMIN — Medication 100 MG: at 08:55

## 2023-11-21 RX ADMIN — FENTANYL CITRATE 25 MCG: 50 INJECTION, SOLUTION INTRAMUSCULAR; INTRAVENOUS at 09:16

## 2023-11-21 RX ADMIN — LIDOCAINE HYDROCHLORIDE 50 MG: 10 INJECTION, SOLUTION EPIDURAL; INFILTRATION; INTRACAUDAL; PERINEURAL at 08:55

## 2023-11-21 RX ADMIN — PROPOFOL 200 MG: 10 INJECTION, EMULSION INTRAVENOUS at 08:55

## 2023-11-21 RX ADMIN — ONDANSETRON 4 MG: 2 INJECTION INTRAMUSCULAR; INTRAVENOUS at 09:06

## 2023-11-21 RX ADMIN — FENTANYL CITRATE 50 MCG: 50 INJECTION, SOLUTION INTRAMUSCULAR; INTRAVENOUS at 08:55

## 2023-11-21 NOTE — H&P
History and Physical - [facility]  Christian Mccrary 59 y.o. male MRN: [id number]    HPI: Christian Mccrary is a 59 y.o. 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     Historical Information   Past Medical History:   Diagnosis Date    Diabetes ([address] [facility])     type 2    GERD (gastroesophageal reflux disease)     Hypertension      Past Surgical History:   Procedure Laterality Date    CATARACT EXTRACTION, BILATERAL      NO PAST SURGERIES       Social History   Social History     Substance and Sexual Activity   Alcohol Use Yes    Comment: 1/ month     Social History     Substance and Sexual Activity   Drug Use No     Social History     Tobacco Use   Smoking Status Former    Packs/day: 1.00    Years: 20.00    Total pack years: 20.00    Types: Cigarettes    Quit date: 4/2/2008    Years since quitting: 15.6   Smokeless Tobacco Never     Family History   Adopted: Yes   Family history unknown: Yes       Meds/Allergies       Current Outpatient Medications:     ascorbic acid (VITAMIN C) 500 mg tablet    aspirin (ECOTRIN LOW STRENGTH) 81 mg EC tablet    atorvastatin (LIPITOR) 40 mg tablet    cholecalciferol (VITAMIN D3) 1,000 units tablet    imipramine (TOFRANIL) 50 mg tablet    Insulin Glargine Solostar (Lantus SoloStar) 100 UNIT/ML SOPN    lisinopril (ZESTRIL) 10 mg tablet    NovoLOG FlexPen 100 units/mL injection pen    omeprazole (PriLOSEC) 40 MG capsule    Continuous Blood Gluc Sensor (FreeStyle Adry 14 Day Sensor) MISC    Insulin Pen Needle (CareOne Unifine Pentips Plus) 31G X 5 MM MISC    No Known Allergies    Objective     /81   Pulse 88   Temp 97.5 °F (36.4 °C) (Temporal)   Resp 16   SpO2 100%     PHYSICAL EXAM    Gen: NAD AAOx3  Head: Normocephalic, Atraumatic  CV: S1S2 RRR no m/r/g  CHEST: Clear b/l no c/r/w  ABD: soft, +BS NT/ND  EXT: no edema    ASSESSMENT/PLAN:  This is a 59y.o. year old male here for upper endoscopy with EMR, and he is stable and optimized for his procedure.

## 2023-11-21 NOTE — ANESTHESIA POSTPROCEDURE EVALUATION
Post-Op Assessment Note    CV Status:  Stable  Pain Score: 0    Pain management: adequate       Mental Status:  Alert and awake   Hydration Status:  Euvolemic   PONV Controlled:  Controlled   Airway Patency:  Patent     Post Op Vitals Reviewed: Yes    No anethesia notable event occurred.     Staff: CRNA           BP   102/61   Temp   98.0   Pulse  82   Resp   18   SpO2   95%

## 2023-11-21 NOTE — ANESTHESIA PREPROCEDURE EVALUATION
Procedure:  EGD    Relevant Problems   CARDIO   (+) Essential hypertension   (+) Hyperlipidemia associated with type 2 diabetes mellitus       ENDO   (+) Type 2 diabetes mellitus with ophthalmic complication, with long-term current use of insulin (HCC)      NEURO/PSYCH   (+) Panic disorder    [id number]    Physical Exam    Airway    Mallampati score: II  TM Distance: >3 FB  Neck ROM: full     Dental    lower dentures and upper dentures    Cardiovascular      Pulmonary      Other Findings        Anesthesia Plan  ASA Score- 3     Anesthesia Type- general with ASA Monitors. Additional Monitors:     Airway Plan: ETT. Plan Factors-Exercise tolerance (METS): >4 METS. Chart reviewed. Patient summary reviewed. Patient is not a current smoker. Induction- intravenous. Postoperative Plan-     Informed Consent- Anesthetic plan and risks discussed with patient and spouse. I personally reviewed this patient with the CRNA. Discussed and agreed on the Anesthesia Plan with the CRNA. Darylene Pipe

## 2023-11-21 NOTE — ANESTHESIA PROCEDURE NOTES
Anesthesia Notable Event    Date/Time: 11/21/2023 9:40 AM    Performed by: Mayo Yates MD  Authorized by: Mayo Yates MD

## 2023-11-28 ENCOUNTER — TELEPHONE (OUTPATIENT)
Dept: GASTROENTEROLOGY | Facility: CLINIC | Age: 58
End: 2023-11-28

## 2023-11-28 PROCEDURE — 88313 SPECIAL STAINS GROUP 2: CPT | Performed by: PATHOLOGY

## 2023-11-28 PROCEDURE — 88342 IMHCHEM/IMCYTCHM 1ST ANTB: CPT | Performed by: PATHOLOGY

## 2023-11-28 PROCEDURE — 88305 TISSUE EXAM BY PATHOLOGIST: CPT | Performed by: PATHOLOGY

## 2023-11-28 PROCEDURE — 88312 SPECIAL STAINS GROUP 1: CPT | Performed by: PATHOLOGY

## 2023-11-28 NOTE — TELEPHONE ENCOUNTER
----- Message from Raya Sinha DO sent at 11/28/2023  3:21 PM EST -----  No cancer or dysplasia seen on EMR-needs repeat EGD with Dr. Vishal Rosario in the [facility] center for full length biopsies with biopsies and WATS

## 2023-11-29 NOTE — TELEPHONE ENCOUNTER
Lvm for pt to call and schedule EGD WITH DR BARRETO AT [facility]-FIRST AVAILABLE    Does not need OA or ASC done

## 2024-01-18 LAB
ALBUMIN SERPL-MCNC: 4.8 G/DL (ref 3.8–4.9)
ALBUMIN/GLOB SERPL: 2.3 {RATIO} (ref 1.2–2.2)
ALP SERPL-CCNC: 69 IU/L (ref 44–121)
ALT SERPL-CCNC: 31 IU/L (ref 0–44)
AST SERPL-CCNC: 27 IU/L (ref 0–40)
BASOPHILS # BLD AUTO: 0.1 X10E3/UL (ref 0–0.2)
BASOPHILS NFR BLD AUTO: 1 %
BILIRUB SERPL-MCNC: 0.4 MG/DL (ref 0–1.2)
BUN SERPL-MCNC: 15 MG/DL (ref 8–27)
BUN/CREAT SERPL: 14 (ref 10–24)
CALCIUM SERPL-MCNC: 9.2 MG/DL (ref 8.6–10.2)
CHLORIDE SERPL-SCNC: 100 MMOL/L (ref 96–106)
CO2 SERPL-SCNC: 25 MMOL/L (ref 20–29)
CREAT SERPL-MCNC: 1.07 MG/DL (ref 0.76–1.27)
EGFR: 79 ML/MIN/1.73
EOSINOPHIL # BLD AUTO: 0.2 X10E3/UL (ref 0–0.4)
EOSINOPHIL NFR BLD AUTO: 3 %
ERYTHROCYTE [DISTWIDTH] IN BLOOD BY AUTOMATED COUNT: 12.7 % (ref 11.6–15.4)
EST. AVERAGE GLUCOSE BLD GHB EST-MCNC: 186 MG/DL
GLOBULIN SER-MCNC: 2.1 G/DL (ref 1.5–4.5)
GLUCOSE SERPL-MCNC: 150 MG/DL (ref 70–99)
HBA1C MFR BLD: 8.1 % (ref 4.8–5.6)
HCT VFR BLD AUTO: 43.6 % (ref 37.5–51)
HGB BLD-MCNC: 14.6 G/DL (ref 13–17.7)
IMM GRANULOCYTES # BLD: 0 X10E3/UL (ref 0–0.1)
IMM GRANULOCYTES NFR BLD: 0 %
LYMPHOCYTES # BLD AUTO: 1.5 X10E3/UL (ref 0.7–3.1)
LYMPHOCYTES NFR BLD AUTO: 30 %
MCH RBC QN AUTO: 30 PG (ref 26.6–33)
MCHC RBC AUTO-ENTMCNC: 33.5 G/DL (ref 31.5–35.7)
MCV RBC AUTO: 90 FL (ref 79–97)
MONOCYTES # BLD AUTO: 0.6 X10E3/UL (ref 0.1–0.9)
MONOCYTES NFR BLD AUTO: 11 %
NEUTROPHILS # BLD AUTO: 2.7 X10E3/UL (ref 1.4–7)
NEUTROPHILS NFR BLD AUTO: 55 %
PLATELET # BLD AUTO: 259 X10E3/UL (ref 150–450)
POTASSIUM SERPL-SCNC: 4.4 MMOL/L (ref 3.5–5.2)
PROT SERPL-MCNC: 6.9 G/DL (ref 6–8.5)
PSA SERPL-MCNC: 0.4 NG/ML (ref 0–4)
RBC # BLD AUTO: 4.87 X10E6/UL (ref 4.14–5.8)
SL AMB REFLEX CRITERIA: NORMAL
SODIUM SERPL-SCNC: 139 MMOL/L (ref 134–144)
TSH SERPL DL<=0.005 MIU/L-ACNC: 2.36 UIU/ML (ref 0.45–4.5)
WBC # BLD AUTO: 5 X10E3/UL (ref 3.4–10.8)

## 2024-01-23 ENCOUNTER — OFFICE VISIT (OUTPATIENT)
Dept: ENDOCRINOLOGY | Facility: HOSPITAL | Age: 59
End: 2024-01-23
Payer: COMMERCIAL

## 2024-01-23 VITALS
HEART RATE: 86 BPM | HEIGHT: 70 IN | BODY MASS INDEX: 26.14 KG/M2 | WEIGHT: 182.6 LBS | DIASTOLIC BLOOD PRESSURE: 94 MMHG | SYSTOLIC BLOOD PRESSURE: 142 MMHG

## 2024-01-23 DIAGNOSIS — Z79.4 TYPE 2 DIABETES MELLITUS WITH RETINOPATHY, WITH LONG-TERM CURRENT USE OF INSULIN, MACULAR EDEMA PRESENCE UNSPECIFIED, UNSPECIFIED LATERALITY, UNSPECIFIED RETINOPATHY SEVERITY (HCC): Primary | ICD-10-CM

## 2024-01-23 DIAGNOSIS — E11.319 TYPE 2 DIABETES MELLITUS WITH RETINOPATHY, WITH LONG-TERM CURRENT USE OF INSULIN, MACULAR EDEMA PRESENCE UNSPECIFIED, UNSPECIFIED LATERALITY, UNSPECIFIED RETINOPATHY SEVERITY (HCC): Primary | ICD-10-CM

## 2024-01-23 DIAGNOSIS — E78.5 HYPERLIPIDEMIA ASSOCIATED WITH TYPE 2 DIABETES MELLITUS: ICD-10-CM

## 2024-01-23 DIAGNOSIS — E11.3293 MILD NONPROLIFERATIVE DIABETIC RETINOPATHY OF BOTH EYES WITHOUT MACULAR EDEMA ASSOCIATED WITH TYPE 2 DIABETES MELLITUS (HCC): ICD-10-CM

## 2024-01-23 DIAGNOSIS — I10 ESSENTIAL HYPERTENSION: ICD-10-CM

## 2024-01-23 DIAGNOSIS — E11.69 HYPERLIPIDEMIA ASSOCIATED WITH TYPE 2 DIABETES MELLITUS: ICD-10-CM

## 2024-01-23 PROCEDURE — 99214 OFFICE O/P EST MOD 30 MIN: CPT | Performed by: NURSE PRACTITIONER

## 2024-01-23 RX ORDER — INSULIN GLARGINE 100 [IU]/ML
INJECTION, SOLUTION SUBCUTANEOUS
Qty: 30 ML | Refills: 3 | Status: SHIPPED | OUTPATIENT
Start: 2024-01-23 | End: 2024-01-23 | Stop reason: SDUPTHER

## 2024-01-23 RX ORDER — INSULIN GLARGINE 100 [IU]/ML
INJECTION, SOLUTION SUBCUTANEOUS
Qty: 30 ML | Refills: 3 | Status: SHIPPED | OUTPATIENT
Start: 2024-01-23

## 2024-01-23 NOTE — PROGRESS NOTES
Alberto Senior 59 y.o. male MRN: [id number]    Encounter: [id number]      Assessment/Plan     Assessment:  This is a 59 y.o.-year-old male with type 2 diabetes with retinopathy, hypertension and hyperlipidemia.       Plan:  1.  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  Freestyle jeremy download was not available at the time of the visit due to a  issue.  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  For now, continue current regimen.  Did discuss adjusting his insulin to carbohydrate ratio at dinnertime when he is traveling to 1:6 as he typically eats larger meals and experiences hyperglycemia after dinner and throughout the evening.  He will continue to utilize the freestyle jeremy and for the report to the office in 2 weeks for review and further adjustment, if necessary.  Check hemoglobin A1c prior to next visit.        2.  Hypertension:  Continue lisinopril.  Check comprehensive metabolic panel prior to next visit.     3.  Hyperlipidemia: Continue atorvastatin. Check fasting lipid panel prior to next visit.    CC: Type 2 Diabetes follow up    History of Present Illness     HPI:  59 y.o. year old male with type 2 diabetes for approximately 20 years.  He is on insulin at home and takes Lantus 32 units in the evening and 44 units in the morning with NovoLog 1 unit for every 10 g of carbs with breakfast, lunch, dinner and snack.  Her most recent hemoglobin A1c from January 17, 2023 is 8.1. He states that his blood sugar readings have been high at times throughout the fall as he has been traveling for work and has been eating out more with snacking. He denies any recent episodes of hypoglycemia, polyuria, polydipsia, nocturia and blurry vision.  He denies neuropathy but does admit to neuropathy and retinopathy.      He obtained his annual diabetic eye exam in August 2023, by his report.  He has mild  retinopathy. He is scheduled with his retina specialist tomorrow. He is getting injections to treat retinopathy. He complains of some discomfort to his feet intermittently but does not follow podiatry for regular diabetic foot care.  Diabetic foot exam was performed at last office visit on August 9, 2023.       Blood Sugar/Glucometer/Pump/CGM review:  Download of his personal freestyle jeremy was unreliable due to date range issues.     For his hypertension, he is treated with lisinopril 10 mg daily.  He states his compliance with his lisinopril has improved.     His hyperlipidemia is treated with atorvastatin 40 mg daily.     Review of Systems   Constitutional: Negative.  Negative for chills, fatigue and fever.   HENT:  Positive for hearing loss. Negative for trouble swallowing and voice change.    Eyes:  Negative for photophobia, pain, discharge, redness, itching and visual disturbance.   Respiratory:  Negative for cough and shortness of breath.    Cardiovascular:  Negative for chest pain and palpitations.   Gastrointestinal:  Negative for abdominal pain, constipation, diarrhea, nausea and vomiting.   Endocrine: Negative for cold intolerance, heat intolerance, polydipsia, polyphagia and polyuria.   Genitourinary: Negative.    Musculoskeletal: Negative.    Skin: Negative.    Allergic/Immunologic: Negative.    Neurological:  Negative for dizziness, syncope, light-headedness and headaches.   Hematological: Negative.    Psychiatric/Behavioral: Negative.     All other systems reviewed and are negative.      Historical Information   Past Medical History:   Diagnosis Date    Diabetes (HCC)     type 2    GERD (gastroesophageal reflux disease)     Hypertension      Past Surgical History:   Procedure Laterality Date    CATARACT EXTRACTION, BILATERAL      NO PAST SURGERIES       Social History   Social History     Substance and Sexual Activity   Alcohol Use Yes    Comment: 1/ month     Social History     Substance and Sexual  Activity   Drug Use No     Social History     Tobacco Use   Smoking Status Former    Current packs/day: 0.00    Average packs/day: 1 pack/day for 20.0 years (20.0 ttl pk-yrs)    Types: Cigarettes    Start date: 4/2/1988    Quit date: 4/2/2008    Years since quitting: 15.8   Smokeless Tobacco Never     Family History:   Family History   Adopted: Yes   Family history unknown: Yes       Meds/Allergies   Current Outpatient Medications   Medication Sig Dispense Refill    ascorbic acid (VITAMIN C) 500 mg tablet Take 500 mg by mouth daily      aspirin (ECOTRIN LOW STRENGTH) 81 mg EC tablet Take 81 mg by mouth daily      atorvastatin (LIPITOR) 40 mg tablet Take 1 tablet (40 mg total) by mouth daily 1 tab daily 30 tablet 11    cholecalciferol (VITAMIN D3) 1,000 units tablet Take 1,000 Units by mouth daily      Continuous Blood Gluc Sensor (FreeStyle Adry 14 Day Sensor) MISC q 14 days 2 each 11    imipramine (TOFRANIL) 50 mg tablet TAKE ONE TABLET BY MOUTH EVERY DAY 30 tablet 11    Insulin Glargine Solostar (Lantus SoloStar) 100 UNIT/ML SOPN INJECT 44 UNITS UNDER THE SKIN IN THE MORNING AND 32 UNITS IN THE EVENING 30 mL 3    Insulin Pen Needle (CareOne Unifine Pentips Plus) 31G X 5 MM MISC USE 5 NEEDLES DAILY 500 each 3    lisinopril (ZESTRIL) 10 mg tablet Take 1 tablet (10 mg total) by mouth daily 1 tab daily 30 tablet 11    NovoLOG FlexPen 100 units/mL injection pen INJECT UP TO 75 UNITS UNDER THE SKIN DAILY 30 mL 6    omeprazole (PriLOSEC) 40 MG capsule Take 1 capsule (40 mg total) by mouth daily 90 capsule 3     No current facility-administered medications for this visit.     No Known Allergies    Objective   Vitals: There were no vitals taken for this visit.    Physical Exam  Vitals reviewed.   Constitutional:       Appearance: He is well-developed.   HENT:      Head: Normocephalic and atraumatic.      Nose: Nose normal.   Eyes:      Conjunctiva/sclera: Conjunctivae normal.      Pupils: Pupils are equal, round, and  reactive to light.   Cardiovascular:      Rate and Rhythm: Normal rate and regular rhythm.      Heart sounds: Normal heart sounds.   Pulmonary:      Effort: Pulmonary effort is normal.      Breath sounds: Normal breath sounds.   Abdominal:      General: Bowel sounds are normal.      Palpations: Abdomen is soft.   Musculoskeletal:         General: Normal range of motion.      Cervical back: Normal range of motion and neck supple.   Skin:     General: Skin is warm and dry.   Neurological:      Mental Status: He is alert and oriented to person, place, and time.   Psychiatric:         Behavior: Behavior normal.         Thought Content: Thought content normal.         Judgment: Judgment normal.       Lab Results:   Lab Results   Component Value Date/Time    Hemoglobin A1C 8.1 (H) 01/17/2024 08:39 AM    Hemoglobin A1C 7.5 (H) 10/04/2023 09:35 AM    Hemoglobin A1C 7.4 (H) 07/03/2023 08:52 AM    White Blood Cell Count 5.0 01/17/2024 08:39 AM    White Blood Cell Count 5.5 07/03/2023 08:52 AM    White Blood Cell Count 5.3 03/17/2023 09:19 AM    Hemoglobin 14.6 01/17/2024 08:39 AM    Hemoglobin 15.1 07/03/2023 08:52 AM    Hemoglobin 14.6 03/17/2023 09:19 AM    HCT 43.6 01/17/2024 08:39 AM    HCT 43.5 07/03/2023 08:52 AM    HCT 42.6 03/17/2023 09:19 AM    MCV 90 01/17/2024 08:39 AM    MCV 88 07/03/2023 08:52 AM    MCV 89 03/17/2023 09:19 AM    Platelet Count 259 01/17/2024 08:39 AM    Platelet Count 261 07/03/2023 08:52 AM    Platelet Count 236 03/17/2023 09:19 AM    BUN 15 01/17/2024 08:39 AM    BUN 18 10/04/2023 09:35 AM    BUN 18 07/03/2023 08:52 AM    Potassium 4.4 01/17/2024 08:39 AM    Potassium 4.9 10/04/2023 09:35 AM    Potassium 4.8 07/03/2023 08:52 AM    Chloride 100 01/17/2024 08:39 AM    Chloride 101 10/04/2023 09:35 AM    Chloride 101 07/03/2023 08:52 AM    CO2 25 01/17/2024 08:39 AM    CO2 26 10/04/2023 09:35 AM    CO2 23 07/03/2023 08:52 AM    Creatinine 1.07 01/17/2024 08:39 AM    Creatinine 1.15 10/04/2023  "09:35 AM    Creatinine 1.14 07/03/2023 08:52 AM    AST 27 01/17/2024 08:39 AM    AST 19 10/04/2023 09:35 AM    AST 26 07/03/2023 08:52 AM    ALT 31 01/17/2024 08:39 AM    ALT 24 10/04/2023 09:35 AM    ALT 25 07/03/2023 08:52 AM    Protein, Total 6.9 01/17/2024 08:39 AM    Protein, Total 6.9 10/04/2023 09:35 AM    Protein, Total 6.5 07/03/2023 08:52 AM    Albumin 4.8 01/17/2024 08:39 AM    Albumin 4.6 10/04/2023 09:35 AM    Albumin 4.4 07/03/2023 08:52 AM    Globulin, Total 2.1 01/17/2024 08:39 AM    Globulin, Total 2.3 10/04/2023 09:35 AM    Globulin, Total 2.1 07/03/2023 08:52 AM    HDL 46 10/04/2023 09:35 AM    HDL 41 03/17/2023 09:19 AM    Triglycerides 103 10/04/2023 09:35 AM    Triglycerides 96 03/17/2023 09:19 AM     Portions of the record may have been created with voice recognition software. Occasional wrong word or \"sound a like\" substitutions may have occurred due to the inherent limitations of voice recognition software. Read the chart carefully and recognize, using context, where substitutions have occurred.    "

## 2024-01-24 LAB
LEFT EYE DIABETIC RETINOPATHY: POSITIVE
LEFT EYE DIABETIC RETINOPATHY: POSITIVE
RIGHT EYE DIABETIC RETINOPATHY: POSITIVE
RIGHT EYE DIABETIC RETINOPATHY: POSITIVE

## 2024-01-24 PROCEDURE — 2022F DILAT RTA XM EVC RTNOPTHY: CPT | Performed by: NURSE PRACTITIONER

## 2024-02-05 ENCOUNTER — TELEPHONE (OUTPATIENT)
Dept: FAMILY MEDICINE CLINIC | Facility: HOSPITAL | Age: 59
End: 2024-02-05

## 2024-02-05 NOTE — TELEPHONE ENCOUNTER
----- Message from PREETHI Hall sent at 2/1/2024 11:03 AM EST -----  Please call pt to schedule a f/u

## 2024-03-01 DIAGNOSIS — E11.319 TYPE 2 DIABETES MELLITUS WITH RETINOPATHY, WITH LONG-TERM CURRENT USE OF INSULIN, MACULAR EDEMA PRESENCE UNSPECIFIED, UNSPECIFIED LATERALITY, UNSPECIFIED RETINOPATHY SEVERITY (HCC): ICD-10-CM

## 2024-03-01 DIAGNOSIS — Z79.4 TYPE 2 DIABETES MELLITUS WITH RETINOPATHY, WITH LONG-TERM CURRENT USE OF INSULIN, MACULAR EDEMA PRESENCE UNSPECIFIED, UNSPECIFIED LATERALITY, UNSPECIFIED RETINOPATHY SEVERITY (HCC): ICD-10-CM

## 2024-03-01 RX ORDER — FLASH GLUCOSE SENSOR
KIT MISCELLANEOUS
Qty: 2 EACH | Refills: 11 | Status: SHIPPED | OUTPATIENT
Start: 2024-03-01

## 2024-04-10 LAB
LEFT EYE DIABETIC RETINOPATHY: POSITIVE
RIGHT EYE DIABETIC RETINOPATHY: POSITIVE

## 2024-04-19 LAB
ALBUMIN SERPL-MCNC: 4.3 G/DL (ref 3.9–4.9)
ALBUMIN/GLOB SERPL: 1.7 {RATIO} (ref 1.2–2.2)
ALP SERPL-CCNC: 69 IU/L (ref 44–121)
ALT SERPL-CCNC: 27 IU/L (ref 0–44)
AST SERPL-CCNC: 25 IU/L (ref 0–40)
BASOPHILS # BLD AUTO: 0.1 X10E3/UL (ref 0–0.2)
BASOPHILS NFR BLD AUTO: 1 %
BILIRUB SERPL-MCNC: 0.6 MG/DL (ref 0–1.2)
BUN SERPL-MCNC: 19 MG/DL (ref 8–27)
BUN/CREAT SERPL: 17 (ref 10–24)
CALCIUM SERPL-MCNC: 9.3 MG/DL (ref 8.6–10.2)
CHLORIDE SERPL-SCNC: 102 MMOL/L (ref 96–106)
CHOLEST SERPL-MCNC: 149 MG/DL (ref 100–199)
CHOLEST/HDLC SERPL: 3.7 RATIO (ref 0–5)
CO2 SERPL-SCNC: 24 MMOL/L (ref 20–29)
CREAT SERPL-MCNC: 1.11 MG/DL (ref 0.76–1.27)
EGFR: 76 ML/MIN/1.73
EOSINOPHIL # BLD AUTO: 0.2 X10E3/UL (ref 0–0.4)
EOSINOPHIL NFR BLD AUTO: 3 %
ERYTHROCYTE [DISTWIDTH] IN BLOOD BY AUTOMATED COUNT: 12.9 % (ref 11.6–15.4)
EST. AVERAGE GLUCOSE BLD GHB EST-MCNC: 174 MG/DL
GLOBULIN SER-MCNC: 2.5 G/DL (ref 1.5–4.5)
GLUCOSE SERPL-MCNC: 113 MG/DL (ref 70–99)
HBA1C MFR BLD: 7.7 % (ref 4.8–5.6)
HCT VFR BLD AUTO: 44.6 % (ref 37.5–51)
HDLC SERPL-MCNC: 40 MG/DL
HGB BLD-MCNC: 14.3 G/DL (ref 13–17.7)
IMM GRANULOCYTES # BLD: 0 X10E3/UL (ref 0–0.1)
IMM GRANULOCYTES NFR BLD: 0 %
LDLC SERPL CALC-MCNC: 91 MG/DL (ref 0–99)
LYMPHOCYTES # BLD AUTO: 1.6 X10E3/UL (ref 0.7–3.1)
LYMPHOCYTES NFR BLD AUTO: 25 %
MCH RBC QN AUTO: 28.6 PG (ref 26.6–33)
MCHC RBC AUTO-ENTMCNC: 32.1 G/DL (ref 31.5–35.7)
MCV RBC AUTO: 89 FL (ref 79–97)
MONOCYTES # BLD AUTO: 0.7 X10E3/UL (ref 0.1–0.9)
MONOCYTES NFR BLD AUTO: 11 %
NEUTROPHILS # BLD AUTO: 3.7 X10E3/UL (ref 1.4–7)
NEUTROPHILS NFR BLD AUTO: 60 %
PLATELET # BLD AUTO: 272 X10E3/UL (ref 150–450)
POTASSIUM SERPL-SCNC: 4.6 MMOL/L (ref 3.5–5.2)
PROT SERPL-MCNC: 6.8 G/DL (ref 6–8.5)
RBC # BLD AUTO: 5 X10E6/UL (ref 4.14–5.8)
SL AMB VLDL CHOLESTEROL CALC: 18 MG/DL (ref 5–40)
SODIUM SERPL-SCNC: 140 MMOL/L (ref 134–144)
T4 FREE SERPL-MCNC: 1.06 NG/DL (ref 0.82–1.77)
TRIGL SERPL-MCNC: 96 MG/DL (ref 0–149)
TSH SERPL DL<=0.005 MIU/L-ACNC: 1.92 UIU/ML (ref 0.45–4.5)
WBC # BLD AUTO: 6.3 X10E3/UL (ref 3.4–10.8)

## 2024-04-25 ENCOUNTER — OFFICE VISIT (OUTPATIENT)
Dept: ENDOCRINOLOGY | Facility: HOSPITAL | Age: 59
End: 2024-04-25
Payer: COMMERCIAL

## 2024-04-25 VITALS
WEIGHT: 181 LBS | DIASTOLIC BLOOD PRESSURE: 90 MMHG | HEIGHT: 70 IN | HEART RATE: 57 BPM | BODY MASS INDEX: 25.91 KG/M2 | SYSTOLIC BLOOD PRESSURE: 142 MMHG

## 2024-04-25 DIAGNOSIS — I10 ESSENTIAL HYPERTENSION: ICD-10-CM

## 2024-04-25 DIAGNOSIS — E11.319 TYPE 2 DIABETES MELLITUS WITH RETINOPATHY, WITH LONG-TERM CURRENT USE OF INSULIN, MACULAR EDEMA PRESENCE UNSPECIFIED, UNSPECIFIED LATERALITY, UNSPECIFIED RETINOPATHY SEVERITY (HCC): Primary | ICD-10-CM

## 2024-04-25 DIAGNOSIS — Z79.4 TYPE 2 DIABETES MELLITUS WITH RETINOPATHY, WITH LONG-TERM CURRENT USE OF INSULIN, MACULAR EDEMA PRESENCE UNSPECIFIED, UNSPECIFIED LATERALITY, UNSPECIFIED RETINOPATHY SEVERITY (HCC): Primary | ICD-10-CM

## 2024-04-25 DIAGNOSIS — E11.3293 MILD NONPROLIFERATIVE DIABETIC RETINOPATHY OF BOTH EYES WITHOUT MACULAR EDEMA ASSOCIATED WITH TYPE 2 DIABETES MELLITUS (HCC): ICD-10-CM

## 2024-04-25 DIAGNOSIS — E78.5 HYPERLIPIDEMIA, UNSPECIFIED HYPERLIPIDEMIA TYPE: ICD-10-CM

## 2024-04-25 PROCEDURE — 95251 CONT GLUC MNTR ANALYSIS I&R: CPT | Performed by: NURSE PRACTITIONER

## 2024-04-25 PROCEDURE — 99214 OFFICE O/P EST MOD 30 MIN: CPT | Performed by: NURSE PRACTITIONER

## 2024-04-25 RX ORDER — LISINOPRIL 20 MG/1
20 TABLET ORAL DAILY
Qty: 30 TABLET | Refills: 11 | Status: SHIPPED | OUTPATIENT
Start: 2024-04-25

## 2024-04-25 RX ORDER — LISINOPRIL 10 MG/1
10 TABLET ORAL DAILY
Qty: 30 TABLET | Refills: 11 | Status: SHIPPED | OUTPATIENT
Start: 2024-04-25 | End: 2024-04-25 | Stop reason: SDUPTHER

## 2024-04-25 RX ORDER — ATORVASTATIN CALCIUM 40 MG/1
40 TABLET, FILM COATED ORAL DAILY
Qty: 30 TABLET | Refills: 11 | Status: SHIPPED | OUTPATIENT
Start: 2024-04-25

## 2024-04-25 RX ORDER — INSULIN GLARGINE 100 [IU]/ML
INJECTION, SOLUTION SUBCUTANEOUS
Qty: 30 ML | Refills: 3 | Status: SHIPPED | OUTPATIENT
Start: 2024-04-25

## 2024-04-25 NOTE — PROGRESS NOTES
Alberto Senior 59 y.o. male MRN: [id number]    Encounter: [id number]      Assessment/Plan     Assessment:  This is a 59 y.o.-year-old male with type 2 diabetes with retinopathy, hypertension and hyperlipidemia.        Plan:  1.  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: His most recent hemoglobin A1c is 7.7.  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  For now, continue current regimen.  Did discuss adjusting his insulin to carbohydrate ratio at dinnertime when he is traveling to 1:6 as he typically eats larger meals and experiences hyperglycemia after dinner and throughout the evening.  He will continue to utilize the freestyle jreemy and for the report to the office in 2 weeks for review and further adjustment, if necessary.  Check hemoglobin A1c prior to next visit.        2.  Hypertension: Blood pressure is once again elevated.  I have asked him to increase his lisinopril to 20 mg daily.  Will check his blood pressures once daily in the morning and send a record of his blood pressures in 2 or 3 weeks to the office for further review and adjustment, if necessary..  Check comprehensive metabolic panel prior to next visit.     3.  Hyperlipidemia: Continue atorvastatin. Check fasting lipid panel prior to next visit.       CC: Type 2 Diabetes follow up    History of Present Illness     HPI:  59 y.o. year old male with type 2 diabetes for approximately 20 years.  He is on insulin at home and takes Lantus 32 units in the evening and 44 units in the morning with NovoLog 1 unit for every 10 g of carbs with breakfast, lunch, dinner and snack.  Her most recent hemoglobin A1c from April 18, 2024 is 7.7. He states that his blood sugar readings have been high at times throughout the fall as he has been traveling for work and has been eating out more with snacking. He denies any recent episodes of hypoglycemia, polyuria, polydipsia, nocturia and blurry vision.   He denies neuropathy but does admit to neuropathy and retinopathy.      He obtained his annual diabetic eye exam in August 2023, by his report.  He has mild retinopathy. He is scheduled with his retina specialist tomorrow. He is getting injections to treat retinopathy. He complains of some discomfort to his feet intermittently but does not follow podiatry for regular diabetic foot care.  Diabetic foot exam was performed at last office visit on August 9, 2023.       Blood Sugar/Glucometer/Pump/CGM review: Freestyle jeremy download from April 12 through April 25, 2024 reveals an average glucose of 158 with a glucose variability of 30.9.  He is in target range 68% of the time with 30% elevated readings and less than 2% low readings.     For his hypertension, he is treated with lisinopril 10 mg daily.  He states his compliance with his lisinopril has improved.     His hyperlipidemia is treated with atorvastatin 40 mg daily.     Review of Systems   Constitutional: Negative.  Negative for chills, fatigue and fever.   HENT:  Positive for hearing loss. Negative for trouble swallowing and voice change.    Eyes:  Negative for photophobia, pain, discharge, redness, itching and visual disturbance.   Respiratory:  Negative for cough and shortness of breath.    Cardiovascular:  Negative for chest pain and palpitations.   Gastrointestinal:  Negative for abdominal pain, constipation, diarrhea, nausea and vomiting.   Endocrine: Negative for cold intolerance, heat intolerance, polydipsia, polyphagia and polyuria.   Genitourinary: Negative.    Musculoskeletal: Negative.    Skin: Negative.    Allergic/Immunologic: Negative.    Neurological:  Negative for dizziness, syncope, light-headedness and headaches.   Hematological: Negative.    Psychiatric/Behavioral: Negative.     All other systems reviewed and are negative.      Historical Information   Past Medical History:   Diagnosis Date    Diabetes (HCC)     type 2    GERD  (gastroesophageal reflux disease)     Hypertension      Past Surgical History:   Procedure Laterality Date    CATARACT EXTRACTION, BILATERAL      NO PAST SURGERIES       Social History   Social History     Substance and Sexual Activity   Alcohol Use Yes    Comment: 1/ month     Social History     Substance and Sexual Activity   Drug Use No     Social History     Tobacco Use   Smoking Status Former    Current packs/day: 0.00    Average packs/day: 1 pack/day for 20.0 years (20.0 ttl pk-yrs)    Types: Cigarettes    Start date: 1988    Quit date: 2008    Years since quittin.0   Smokeless Tobacco Never     Family History:   Family History   Adopted: Yes   Family history unknown: Yes       Meds/Allergies   Current Outpatient Medications   Medication Sig Dispense Refill    ascorbic acid (VITAMIN C) 500 mg tablet Take 500 mg by mouth daily      aspirin (ECOTRIN LOW STRENGTH) 81 mg EC tablet Take 81 mg by mouth daily      atorvastatin (LIPITOR) 40 mg tablet Take 1 tablet (40 mg total) by mouth daily 1 tab daily 30 tablet 11    cholecalciferol (VITAMIN D3) 1,000 units tablet Take 1,000 Units by mouth daily      Continuous Blood Gluc Sensor (Neurotrope BioscienceStyle Adry 14 Day Sensor) MISC q 14 days 2 each 11    imipramine (TOFRANIL) 50 mg tablet TAKE ONE TABLET BY MOUTH EVERY DAY 30 tablet 11    Insulin Glargine Solostar (Lantus SoloStar) 100 UNIT/ML SOPN INJECT 44 UNITS UNDER THE SKIN IN THE MORNING AND 32 UNITS IN THE EVENING 30 mL 3    Insulin Pen Needle (CareOne Unifine Pentips Plus) 31G X 5 MM MISC USE 5 NEEDLES DAILY 500 each 3    lisinopril (ZESTRIL) 10 mg tablet Take 1 tablet (10 mg total) by mouth daily 1 tab daily 30 tablet 11    NovoLOG FlexPen 100 units/mL injection pen INJECT UP TO 75 UNITS UNDER THE SKIN DAILY 30 mL 6    omeprazole (PriLOSEC) 40 MG capsule Take 1 capsule (40 mg total) by mouth daily 90 capsule 3     No current facility-administered medications for this visit.     No Known Allergies    Objective  "  Vitals: Blood pressure 142/90, pulse 57, height 5' 10\" (1.778 m), weight 82.1 kg (181 lb).    Physical Exam  Vitals reviewed.   Constitutional:       Appearance: He is well-developed.   HENT:      Head: Normocephalic and atraumatic.      Nose: Nose normal.   Eyes:      Conjunctiva/sclera: Conjunctivae normal.      Pupils: Pupils are equal, round, and reactive to light.   Cardiovascular:      Rate and Rhythm: Normal rate and regular rhythm.      Heart sounds: Normal heart sounds.   Pulmonary:      Effort: Pulmonary effort is normal.      Breath sounds: Normal breath sounds.   Abdominal:      General: Bowel sounds are normal.      Palpations: Abdomen is soft.   Musculoskeletal:         General: Normal range of motion.      Cervical back: Normal range of motion and neck supple.   Skin:     General: Skin is warm and dry.   Neurological:      Mental Status: He is alert and oriented to person, place, and time.   Psychiatric:         Behavior: Behavior normal.         Thought Content: Thought content normal.         Judgment: Judgment normal.     Lab Results:   Lab Results   Component Value Date/Time    Hemoglobin A1C 7.7 (H) 04/18/2024 09:06 AM    Hemoglobin A1C 8.1 (H) 01/17/2024 08:39 AM    Hemoglobin A1C 7.5 (H) 10/04/2023 09:35 AM    White Blood Cell Count 6.3 04/18/2024 09:06 AM    White Blood Cell Count 5.0 01/17/2024 08:39 AM    White Blood Cell Count 5.5 07/03/2023 08:52 AM    Hemoglobin 14.3 04/18/2024 09:06 AM    Hemoglobin 14.6 01/17/2024 08:39 AM    Hemoglobin 15.1 07/03/2023 08:52 AM    HCT 44.6 04/18/2024 09:06 AM    HCT 43.6 01/17/2024 08:39 AM    HCT 43.5 07/03/2023 08:52 AM    MCV 89 04/18/2024 09:06 AM    MCV 90 01/17/2024 08:39 AM    MCV 88 07/03/2023 08:52 AM    Platelet Count 272 04/18/2024 09:06 AM    Platelet Count 259 01/17/2024 08:39 AM    Platelet Count 261 07/03/2023 08:52 AM    BUN 19 04/18/2024 09:06 AM    BUN 15 01/17/2024 08:39 AM    BUN 18 10/04/2023 09:35 AM    Potassium 4.6 04/18/2024 " "09:06 AM    Potassium 4.4 01/17/2024 08:39 AM    Potassium 4.9 10/04/2023 09:35 AM    Chloride 102 04/18/2024 09:06 AM    Chloride 100 01/17/2024 08:39 AM    Chloride 101 10/04/2023 09:35 AM    CO2 24 04/18/2024 09:06 AM    CO2 25 01/17/2024 08:39 AM    CO2 26 10/04/2023 09:35 AM    Creatinine 1.11 04/18/2024 09:06 AM    Creatinine 1.07 01/17/2024 08:39 AM    Creatinine 1.15 10/04/2023 09:35 AM    AST 25 04/18/2024 09:06 AM    AST 27 01/17/2024 08:39 AM    AST 19 10/04/2023 09:35 AM    ALT 27 04/18/2024 09:06 AM    ALT 31 01/17/2024 08:39 AM    ALT 24 10/04/2023 09:35 AM    Protein, Total 6.8 04/18/2024 09:06 AM    Protein, Total 6.9 01/17/2024 08:39 AM    Protein, Total 6.9 10/04/2023 09:35 AM    Albumin 4.3 04/18/2024 09:06 AM    Albumin 4.8 01/17/2024 08:39 AM    Albumin 4.6 10/04/2023 09:35 AM    Globulin, Total 2.5 04/18/2024 09:06 AM    Globulin, Total 2.1 01/17/2024 08:39 AM    Globulin, Total 2.3 10/04/2023 09:35 AM    HDL 40 04/18/2024 09:06 AM    HDL 46 10/04/2023 09:35 AM    Triglycerides 96 04/18/2024 09:06 AM    Triglycerides 103 10/04/2023 09:35 AM     Portions of the record may have been created with voice recognition software. Occasional wrong word or \"sound a like\" substitutions may have occurred due to the inherent limitations of voice recognition software. Read the chart carefully and recognize, using context, where substitutions have occurred.    "

## 2024-04-25 NOTE — PATIENT INSTRUCTIONS
Be mindful of diet.      Exercise regularly and stay hydrated.      Continue NovoLog at current dose.     Continue morning Lantus 44 units and 32 units in the evening.     As discussed, when traveling and eating larger dinners, please utilize a 1:6 insulin to carbohydrate ratio.     Make sure you are injecting insulin consistently BEFORE MEALS.     Continue to utilize the freestyle jeremy.     Contact the office with any consistent hypoglycemia.    Increase lisinopril to 20 mg and check your blood pressure once daily.    Send a record of your blood pressures to the office in 2-3 weeks for review.     Continue atorvastatin.     Obtain lab work prior to next visit.

## 2024-05-15 DIAGNOSIS — F41.0 PANIC DISORDER: ICD-10-CM

## 2024-05-17 RX ORDER — IMIPRAMINE HCL 50 MG
50 TABLET ORAL DAILY
Qty: 30 TABLET | Refills: 0 | Status: SHIPPED | OUTPATIENT
Start: 2024-05-17

## 2024-05-17 NOTE — TELEPHONE ENCOUNTER
If I am to take over management of mood medication, he will need to be seen at a minimum of every 6 months. Not seen since October. One month supply filled. Please schedule for a routine f/u.

## 2024-06-10 ENCOUNTER — TELEPHONE (OUTPATIENT)
Age: 59
End: 2024-06-10

## 2024-06-10 DIAGNOSIS — E11.3293 MILD NONPROLIFERATIVE DIABETIC RETINOPATHY OF BOTH EYES WITHOUT MACULAR EDEMA ASSOCIATED WITH TYPE 2 DIABETES MELLITUS (HCC): Primary | ICD-10-CM

## 2024-06-10 NOTE — TELEPHONE ENCOUNTER
Ambulatory and insurance referral needed:      Patient is requesting an insurance referral for the following specialty:      Test Name / Order Name: [facility]     DX Code: Retinaopathy     Date Of Service: 6/12/2024    Location/Facility Name/Address/Phone #: [facility] [address] [place] ([phone])    Location / Facility NPI: [id number]    [facility] Phone # To Reach The Patient:  [phone]

## 2024-06-20 ENCOUNTER — OFFICE VISIT (OUTPATIENT)
Dept: FAMILY MEDICINE CLINIC | Facility: HOSPITAL | Age: 59
End: 2024-06-20
Payer: COMMERCIAL

## 2024-06-20 VITALS
SYSTOLIC BLOOD PRESSURE: 152 MMHG | BODY MASS INDEX: 25.91 KG/M2 | DIASTOLIC BLOOD PRESSURE: 80 MMHG | HEIGHT: 70 IN | WEIGHT: 181 LBS | HEART RATE: 94 BPM | TEMPERATURE: 97.2 F | OXYGEN SATURATION: 98 %

## 2024-06-20 DIAGNOSIS — I10 ESSENTIAL HYPERTENSION: ICD-10-CM

## 2024-06-20 DIAGNOSIS — E11.69 HYPERLIPIDEMIA ASSOCIATED WITH TYPE 2 DIABETES MELLITUS  (HCC): ICD-10-CM

## 2024-06-20 DIAGNOSIS — E11.319 TYPE 2 DIABETES MELLITUS WITH RETINOPATHY, WITH LONG-TERM CURRENT USE OF INSULIN, MACULAR EDEMA PRESENCE UNSPECIFIED, UNSPECIFIED LATERALITY, UNSPECIFIED RETINOPATHY SEVERITY (HCC): ICD-10-CM

## 2024-06-20 DIAGNOSIS — Z79.4 TYPE 2 DIABETES MELLITUS WITH RETINOPATHY, WITH LONG-TERM CURRENT USE OF INSULIN, MACULAR EDEMA PRESENCE UNSPECIFIED, UNSPECIFIED LATERALITY, UNSPECIFIED RETINOPATHY SEVERITY (HCC): ICD-10-CM

## 2024-06-20 DIAGNOSIS — F41.0 PANIC DISORDER: ICD-10-CM

## 2024-06-20 DIAGNOSIS — E11.3293 MILD NONPROLIFERATIVE DIABETIC RETINOPATHY OF BOTH EYES WITHOUT MACULAR EDEMA ASSOCIATED WITH TYPE 2 DIABETES MELLITUS (HCC): Primary | ICD-10-CM

## 2024-06-20 DIAGNOSIS — E78.5 HYPERLIPIDEMIA ASSOCIATED WITH TYPE 2 DIABETES MELLITUS  (HCC): ICD-10-CM

## 2024-06-20 PROCEDURE — 99214 OFFICE O/P EST MOD 30 MIN: CPT | Performed by: NURSE PRACTITIONER

## 2024-06-20 RX ORDER — IMIPRAMINE HCL 50 MG
50 TABLET ORAL DAILY
Qty: 90 TABLET | Refills: 1 | Status: SHIPPED | OUTPATIENT
Start: 2024-06-20

## 2024-06-20 RX ORDER — LISINOPRIL AND HYDROCHLOROTHIAZIDE 20; 12.5 MG/1; MG/1
1 TABLET ORAL DAILY
Qty: 30 TABLET | Refills: 1 | Status: SHIPPED | OUTPATIENT
Start: 2024-06-20

## 2024-06-20 NOTE — ASSESSMENT & PLAN NOTE
Lab Results   Component Value Date    HGBA1C 7.7 (H) 04/18/2024   Well controlled FLP on atorvastatin.

## 2024-06-20 NOTE — PROGRESS NOTES
Ambulatory Visit  Name: Alberto Senior      : 1965      MRN: [id number]  Encounter Provider: PREETHI Hall  Encounter Date: 2024   Encounter department: [facility] [address]     Assessment & Plan   1. Mild nonproliferative diabetic retinopathy of both eyes without macular edema associated with type 2 diabetes mellitus (HCC)  Assessment & Plan:    Lab Results   Component Value Date    HGBA1C 7.7 (H) 2024   Gradually decreasing.   Managed by endo.   UTD with eye exam. Foot exam done today.   Urine microalbumin is ordered.   2. Type 2 diabetes mellitus with retinopathy, with long-term current use of insulin, macular edema presence unspecified, unspecified laterality, unspecified retinopathy severity (HCC)  Assessment & Plan:    Lab Results   Component Value Date    HGBA1C 7.7 (H) 2024   Retinopathy managed by ophthalmology.   3. Hyperlipidemia associated with type 2 diabetes mellitus  (HCC)  Assessment & Plan:    Lab Results   Component Value Date    HGBA1C 7.7 (H) 2024   Well controlled FLP on atorvastatin.   4. Essential hypertension  Assessment & Plan:  BP is not controlled.   Added HCTZ- lisnopril/HCTZ sent.   F/U in 4 weeks.   Orders:  -     lisinopril-hydrochlorothiazide (PRINZIDE,ZESTORETIC) 20-12.5 MG per tablet; Take 1 tablet by mouth daily  5. Panic disorder  Assessment & Plan:  Requesting refill of imipramine. Has been on for 40 years and doing well.   Refill issued.   Orders:  -     imipramine (TOFRANIL) 50 mg tablet; Take 1 tablet (50 mg total) by mouth daily    Pt will need repeat colonoscopy in November due to poor prep. He is refusing to do this.     Depression Screening and Follow-up Plan: Patient was screened for depression during today's encounter. They screened negative with a PHQ-2 score of 0.        History of Present Illness     He is doing well.   DM is managed by endo and improving. Sees ophthalmology every 3 months.   Does not check  BP at home. He is mostly consistent with taking lisinopril. Reports this was increased by endo recently.   Needs imipramine refilled. Has been on this for 40 years. Anxiety well controlled.       Review of Systems   Constitutional:  Negative for unexpected weight change.   Eyes:  Negative for visual disturbance.   Respiratory:  Negative for shortness of breath.    Cardiovascular:  Negative for chest pain, palpitations and leg swelling.   Musculoskeletal:  Negative for myalgias.   Neurological:  Negative for dizziness, weakness, light-headedness, numbness and headaches.     Past Medical History:   Diagnosis Date    Diabetes (HCC)     type 2    GERD (gastroesophageal reflux disease)     Hypertension      Past Surgical History:   Procedure Laterality Date    CATARACT EXTRACTION, BILATERAL      NO PAST SURGERIES       Family History   Adopted: Yes   Family history unknown: Yes     Social History     Tobacco Use    Smoking status: Former     Current packs/day: 0.00     Average packs/day: 1 pack/day for 20.0 years (20.0 ttl pk-yrs)     Types: Cigarettes     Start date: 1988     Quit date: 2008     Years since quittin.2    Smokeless tobacco: Never   Vaping Use    Vaping status: Never Used   Substance and Sexual Activity    Alcohol use: Yes     Comment: 1/ month    Drug use: No    Sexual activity: Not on file     Current Outpatient Medications on File Prior to Visit   Medication Sig    ascorbic acid (VITAMIN C) 500 mg tablet Take 500 mg by mouth daily    aspirin (ECOTRIN LOW STRENGTH) 81 mg EC tablet Take 81 mg by mouth daily    atorvastatin (LIPITOR) 40 mg tablet Take 1 tablet (40 mg total) by mouth daily 1 tab daily    cholecalciferol (VITAMIN D3) 1,000 units tablet Take 1,000 Units by mouth daily    Continuous Blood Gluc Sensor (FreeStyle Adry 14 Day Sensor) MISC q 14 days    Insulin Glargine Solostar (Lantus SoloStar) 100 UNIT/ML SOPN INJECT 44 UNITS UNDER THE SKIN IN THE MORNING AND 32 UNITS IN THE  "EVENING    Insulin Pen Needle (CareOne Unifine Pentips Plus) 31G X 5 MM MISC USE 5 NEEDLES DAILY    NovoLOG FlexPen 100 units/mL injection pen INJECT UP TO 75 UNITS UNDER THE SKIN DAILY    omeprazole (PriLOSEC) 40 MG capsule Take 1 capsule (40 mg total) by mouth daily    [DISCONTINUED] imipramine (TOFRANIL) 50 mg tablet Take 1 tablet (50 mg total) by mouth daily    [DISCONTINUED] lisinopril (ZESTRIL) 20 mg tablet Take 1 tablet (20 mg total) by mouth daily 1 tab daily     No Known Allergies  Immunization History   Administered Date(s) Administered    COVID-19 PFIZER VACCINE 0.3 ML IM 03/16/2021, 04/06/2021    Influenza, seasonal, injectable 09/02/2014    Pneumococcal Polysaccharide PPV23 01/30/2017    Tdap 04/02/2018     Objective     /80 (BP Location: Left arm, Patient Position: Sitting, Cuff Size: Standard)   Pulse 94   Temp (!) 97.2 °F (36.2 °C) (Tympanic)   Ht 5' 10\" (1.778 m)   Wt 82.1 kg (181 lb)   SpO2 98%   BMI 25.97 kg/m²     Physical Exam  Vitals reviewed.   Constitutional:       Appearance: Normal appearance. He is well-developed.   Cardiovascular:      Rate and Rhythm: Normal rate and regular rhythm.      Pulses: no weak pulses.           Carotid pulses are 2+ on the right side and 2+ on the left side.       Dorsalis pedis pulses are 2+ on the right side and 2+ on the left side.        Posterior tibial pulses are 2+ on the right side and 2+ on the left side.      Heart sounds: Normal heart sounds. No murmur heard.  Pulmonary:      Effort: Pulmonary effort is normal.      Breath sounds: Normal breath sounds.   Feet:      Right foot:      Skin integrity: No ulcer, skin breakdown, erythema, warmth, callus or dry skin.      Left foot:      Skin integrity: No ulcer, skin breakdown, erythema, warmth, callus or dry skin.   Skin:     General: Skin is warm and dry.   Neurological:      Mental Status: He is alert and oriented to person, place, and time.   Psychiatric:         Mood and Affect: Mood " normal.         Behavior: Behavior normal.         Thought Content: Thought content normal.         Judgment: Judgment normal.     Patient's shoes and socks removed.    Right Foot/Ankle   Right Foot Inspection  Skin Exam: skin normal and skin intact. No dry skin, no warmth, no callus, no erythema, no maceration, no abnormal color, no pre-ulcer, no ulcer and no callus.     Toe Exam: ROM and strength within normal limits.     Sensory   Vibration: intact  Monofilament testing: intact    Vascular  Capillary refills: < 3 seconds  The right DP pulse is 2+. The right PT pulse is 2+.     Left Foot/Ankle  Left Foot Inspection  Skin Exam: skin normal and skin intact. No dry skin, no warmth, no erythema, no maceration, normal color, no pre-ulcer, no ulcer and no callus.     Toe Exam: ROM and strength within normal limits.     Sensory   Vibration: intact  Monofilament testing: intact    Vascular  Capillary refills: < 3 seconds  The left DP pulse is 2+. The left PT pulse is 2+.     Assign Risk Category  No deformity present  No loss of protective sensation  No weak pulses  Risk: 0     Administrative Statements

## 2024-06-20 NOTE — ASSESSMENT & PLAN NOTE
Lab Results   Component Value Date    HGBA1C 7.7 (H) 04/18/2024   Gradually decreasing.   Managed by latisha WANG with eye exam. Foot exam done today.   Urine microalbumin is ordered.

## 2024-06-20 NOTE — ASSESSMENT & PLAN NOTE
Lab Results   Component Value Date    HGBA1C 7.7 (H) 04/18/2024   Retinopathy managed by ophthalmology.

## 2024-07-18 ENCOUNTER — OFFICE VISIT (OUTPATIENT)
Dept: FAMILY MEDICINE CLINIC | Facility: HOSPITAL | Age: 59
End: 2024-07-18
Payer: COMMERCIAL

## 2024-07-18 VITALS
BODY MASS INDEX: 25.91 KG/M2 | DIASTOLIC BLOOD PRESSURE: 86 MMHG | SYSTOLIC BLOOD PRESSURE: 154 MMHG | HEART RATE: 88 BPM | WEIGHT: 181 LBS | HEIGHT: 70 IN | OXYGEN SATURATION: 99 % | TEMPERATURE: 97.6 F

## 2024-07-18 DIAGNOSIS — I10 ESSENTIAL HYPERTENSION: Primary | ICD-10-CM

## 2024-07-18 DIAGNOSIS — E11.319 TYPE 2 DIABETES MELLITUS WITH RETINOPATHY, WITH LONG-TERM CURRENT USE OF INSULIN, MACULAR EDEMA PRESENCE UNSPECIFIED, UNSPECIFIED LATERALITY, UNSPECIFIED RETINOPATHY SEVERITY (HCC): ICD-10-CM

## 2024-07-18 DIAGNOSIS — Z79.4 TYPE 2 DIABETES MELLITUS WITH RETINOPATHY, WITH LONG-TERM CURRENT USE OF INSULIN, MACULAR EDEMA PRESENCE UNSPECIFIED, UNSPECIFIED LATERALITY, UNSPECIFIED RETINOPATHY SEVERITY (HCC): ICD-10-CM

## 2024-07-18 PROCEDURE — 99214 OFFICE O/P EST MOD 30 MIN: CPT | Performed by: NURSE PRACTITIONER

## 2024-07-18 RX ORDER — AMLODIPINE BESYLATE 5 MG/1
5 TABLET ORAL DAILY
Qty: 30 TABLET | Refills: 1 | Status: CANCELLED | OUTPATIENT
Start: 2024-07-18

## 2024-07-18 NOTE — PROGRESS NOTES
Ambulatory Visit  Name: Alberto Senior      : 1965      MRN: [id number]  Encounter Provider: PREETHI Hall  Encounter Date: 2024   Encounter department: [facility] [address]     Assessment & Plan   1. Essential hypertension  Assessment & Plan:  His BP remains elevated. He does admit to missing 2 doses of HCTZ/week.   He is requesting to try lifestyle changes to help BP rather than start 3 rd agent.    I also recommend he purchase home cuff and start checking BP at least once day. Proper technique discussed. He should record and bring readings along with cuff with him to  in 4 weeks.   2. Type 2 diabetes mellitus with retinopathy, with long-term current use of insulin, macular edema presence unspecified, unspecified laterality, unspecified retinopathy severity (HCC)  -     Ambulatory referral to Endocrinology; Future; Expected date: 2024  -     Albumin / creatinine urine ratio         History of Present Illness     He is not checking BP at home and does not own cuff. He does admit that he could benefit from more CV exercise although he reports being very active.   He will take lisinopril only 1-2 days/week because he does not have access to bathroom on those days so missing HCTZ dose at least twice/week.   He reports an episode of heart racing when he was outside in the heat and ended up vomiting from the heat. No other episodes.       Review of Systems   Constitutional:  Negative for fatigue and unexpected weight change.   Eyes:  Negative for visual disturbance.   Respiratory:  Negative for shortness of breath.    Cardiovascular:  Negative for chest pain, palpitations and leg swelling.   Musculoskeletal:  Negative for myalgias.   Neurological:  Negative for dizziness, weakness, light-headedness, numbness and headaches.     Past Medical History:   Diagnosis Date    Diabetes (HCC)     type 2    GERD (gastroesophageal reflux disease)     Hypertension      Past Surgical  History:   Procedure Laterality Date    CATARACT EXTRACTION, BILATERAL      NO PAST SURGERIES       Family History   Adopted: Yes   Family history unknown: Yes     Social History     Tobacco Use    Smoking status: Former     Current packs/day: 0.00     Average packs/day: 1 pack/day for 20.0 years (20.0 ttl pk-yrs)     Types: Cigarettes     Start date: 1988     Quit date: 2008     Years since quittin.3    Smokeless tobacco: Never   Vaping Use    Vaping status: Never Used   Substance and Sexual Activity    Alcohol use: Yes     Comment: 1/ month    Drug use: No    Sexual activity: Not on file     Current Outpatient Medications on File Prior to Visit   Medication Sig    ascorbic acid (VITAMIN C) 500 mg tablet Take 500 mg by mouth daily    aspirin (ECOTRIN LOW STRENGTH) 81 mg EC tablet Take 81 mg by mouth daily    atorvastatin (LIPITOR) 40 mg tablet Take 1 tablet (40 mg total) by mouth daily 1 tab daily    cholecalciferol (VITAMIN D3) 1,000 units tablet Take 1,000 Units by mouth daily    Continuous Blood Gluc Sensor (FreeStyle Adry 14 Day Sensor) MISC q 14 days    imipramine (TOFRANIL) 50 mg tablet Take 1 tablet (50 mg total) by mouth daily    Insulin Glargine Solostar (Lantus SoloStar) 100 UNIT/ML SOPN INJECT 44 UNITS UNDER THE SKIN IN THE MORNING AND 32 UNITS IN THE EVENING    Insulin Pen Needle (CareOne Unifine Pentips Plus) 31G X 5 MM MISC USE 5 NEEDLES DAILY    lisinopril-hydrochlorothiazide (PRINZIDE,ZESTORETIC) 20-12.5 MG per tablet Take 1 tablet by mouth daily    NovoLOG FlexPen 100 units/mL injection pen INJECT UP TO 75 UNITS UNDER THE SKIN DAILY    omeprazole (PriLOSEC) 40 MG capsule Take 1 capsule (40 mg total) by mouth daily     No Known Allergies  Immunization History   Administered Date(s) Administered    COVID-19 PFIZER VACCINE 0.3 ML IM 2021, 2021    Influenza, seasonal, injectable 2014    Pneumococcal Polysaccharide PPV23 2017    Tdap 2018     Objective     BP  "154/86 (BP Location: Left arm, Patient Position: Sitting, Cuff Size: Standard)   Pulse 88   Temp 97.6 °F (36.4 °C) (Tympanic)   Ht 5' 10\" (1.778 m)   Wt 82.1 kg (181 lb)   SpO2 99%   BMI 25.97 kg/m²     Physical Exam  Vitals reviewed.   Constitutional:       Appearance: Normal appearance. He is well-developed.   Cardiovascular:      Rate and Rhythm: Normal rate and regular rhythm.      Pulses:           Carotid pulses are 2+ on the right side and 2+ on the left side.     Heart sounds: Normal heart sounds. No murmur heard.  Pulmonary:      Effort: Pulmonary effort is normal.      Breath sounds: Normal breath sounds.   Skin:     General: Skin is warm and dry.   Neurological:      Mental Status: He is alert and oriented to person, place, and time.   Psychiatric:         Mood and Affect: Mood normal.         Behavior: Behavior normal.         Thought Content: Thought content normal.         Judgment: Judgment normal.       Administrative Statements   I have spent a total time of 20 minutes in caring for this patient on the day of the visit/encounter including Risks and benefits of tx options, Instructions for management, Importance of tx compliance, Documenting in the medical record, Reviewing / ordering tests, medicine, procedures  , and Obtaining or reviewing history  .  "

## 2024-07-18 NOTE — ASSESSMENT & PLAN NOTE
His BP remains elevated. He does admit to missing 2 doses of HCTZ/week.   He is requesting to try lifestyle changes to help BP rather than start 3 rd agent.    I also recommend he purchase home cuff and start checking BP at least once day. Proper technique discussed. He should record and bring readings along with cuff with him to OV in 4 weeks.

## 2024-07-20 LAB
ALBUMIN/CREAT UR: <5 MG/G CREAT (ref 0–29)
CREAT UR-MCNC: 57.1 MG/DL
MICROALBUMIN UR-MCNC: <3 UG/ML

## 2024-08-02 LAB
ALBUMIN SERPL-MCNC: 4.6 G/DL (ref 3.9–4.9)
ALP SERPL-CCNC: 76 IU/L (ref 44–121)
ALT SERPL-CCNC: 31 IU/L (ref 0–44)
AST SERPL-CCNC: 25 IU/L (ref 0–40)
BASOPHILS # BLD AUTO: 0.1 X10E3/UL (ref 0–0.2)
BASOPHILS NFR BLD AUTO: 1 %
BILIRUB SERPL-MCNC: 0.4 MG/DL (ref 0–1.2)
BUN SERPL-MCNC: 17 MG/DL (ref 8–27)
BUN/CREAT SERPL: 16 (ref 10–24)
CALCIUM SERPL-MCNC: 9.2 MG/DL (ref 8.6–10.2)
CHLORIDE SERPL-SCNC: 96 MMOL/L (ref 96–106)
CO2 SERPL-SCNC: 25 MMOL/L (ref 20–29)
CREAT SERPL-MCNC: 1.09 MG/DL (ref 0.76–1.27)
EGFR: 77 ML/MIN/1.73
EOSINOPHIL # BLD AUTO: 0.2 X10E3/UL (ref 0–0.4)
EOSINOPHIL NFR BLD AUTO: 3 %
ERYTHROCYTE [DISTWIDTH] IN BLOOD BY AUTOMATED COUNT: 13.2 % (ref 11.6–15.4)
EST. AVERAGE GLUCOSE BLD GHB EST-MCNC: 163 MG/DL
GLOBULIN SER-MCNC: 2.3 G/DL (ref 1.5–4.5)
GLUCOSE SERPL-MCNC: 128 MG/DL (ref 70–99)
HBA1C MFR BLD: 7.3 % (ref 4.8–5.6)
HCT VFR BLD AUTO: 41.2 % (ref 37.5–51)
HGB BLD-MCNC: 14 G/DL (ref 13–17.7)
IMM GRANULOCYTES # BLD: 0 X10E3/UL (ref 0–0.1)
IMM GRANULOCYTES NFR BLD: 0 %
LYMPHOCYTES # BLD AUTO: 1.4 X10E3/UL (ref 0.7–3.1)
LYMPHOCYTES NFR BLD AUTO: 25 %
MCH RBC QN AUTO: 30.4 PG (ref 26.6–33)
MCHC RBC AUTO-ENTMCNC: 34 G/DL (ref 31.5–35.7)
MCV RBC AUTO: 89 FL (ref 79–97)
MONOCYTES # BLD AUTO: 0.7 X10E3/UL (ref 0.1–0.9)
MONOCYTES NFR BLD AUTO: 12 %
NEUTROPHILS # BLD AUTO: 3.3 X10E3/UL (ref 1.4–7)
NEUTROPHILS NFR BLD AUTO: 59 %
PLATELET # BLD AUTO: 248 X10E3/UL (ref 150–450)
POTASSIUM SERPL-SCNC: 4.6 MMOL/L (ref 3.5–5.2)
PROT SERPL-MCNC: 6.9 G/DL (ref 6–8.5)
RBC # BLD AUTO: 4.61 X10E6/UL (ref 4.14–5.8)
SODIUM SERPL-SCNC: 134 MMOL/L (ref 134–144)
WBC # BLD AUTO: 5.5 X10E3/UL (ref 3.4–10.8)

## 2024-08-15 ENCOUNTER — OFFICE VISIT (OUTPATIENT)
Dept: FAMILY MEDICINE CLINIC | Facility: HOSPITAL | Age: 59
End: 2024-08-15
Payer: COMMERCIAL

## 2024-08-15 VITALS
HEIGHT: 70 IN | WEIGHT: 179 LBS | SYSTOLIC BLOOD PRESSURE: 130 MMHG | TEMPERATURE: 97.7 F | OXYGEN SATURATION: 99 % | HEART RATE: 90 BPM | BODY MASS INDEX: 25.62 KG/M2 | DIASTOLIC BLOOD PRESSURE: 70 MMHG

## 2024-08-15 DIAGNOSIS — I10 ESSENTIAL HYPERTENSION: Primary | ICD-10-CM

## 2024-08-15 PROCEDURE — 99213 OFFICE O/P EST LOW 20 MIN: CPT | Performed by: NURSE PRACTITIONER

## 2024-08-15 NOTE — ASSESSMENT & PLAN NOTE
BP is much improved.   Home readings mostly 130/60s. He does have some higher numbers in the 140-150 range.   Instructed he should sit for 5 minutes prior to taking his BP.   Continue current regimen.   Continue checking BP at home a few times/week. Call if consistently > 140/90.   Discussed low sodium diet and minimizing caffeine.  Plan to f/u in 3 months.

## 2024-08-15 NOTE — PROGRESS NOTES
Ambulatory Visit  Name: Alberto Senior      : 1965      MRN: [id number]  Encounter Provider: PREETHI Hall  Encounter Date: 8/15/2024   Encounter department: [facility] [address]     Assessment & Plan   1. Essential hypertension  Assessment & Plan:  BP is much improved.   Home readings mostly 130/60s. He does have some higher numbers in the 140-150 range.   Instructed he should sit for 5 minutes prior to taking his BP.   Continue current regimen.   Continue checking BP at home a few times/week. Call if consistently > 140/90.   Discussed low sodium diet and minimizing caffeine.  Plan to f/u in 3 months.          History of Present Illness     He has been taking Bp at home. Runs mostly in 130/60s but does have readings in 140-150 range. He has increased his activity level. Trying to cut back on caffeine. He is taking his BP med daily and not missing doses. He feels well w/o any AE.       Review of Systems   Constitutional:  Negative for fatigue and unexpected weight change.   Eyes:  Negative for visual disturbance.   Respiratory:  Negative for shortness of breath.    Cardiovascular:  Negative for chest pain, palpitations and leg swelling.   Musculoskeletal:  Negative for myalgias.   Neurological:  Negative for dizziness, weakness, light-headedness, numbness and headaches.     Past Medical History:   Diagnosis Date    Diabetes (HCC)     type 2    GERD (gastroesophageal reflux disease)     Hypertension      Past Surgical History:   Procedure Laterality Date    CATARACT EXTRACTION, BILATERAL      NO PAST SURGERIES       Family History   Adopted: Yes   Family history unknown: Yes     Social History     Tobacco Use    Smoking status: Former     Current packs/day: 0.00     Average packs/day: 1 pack/day for 20.0 years (20.0 ttl pk-yrs)     Types: Cigarettes     Start date: 1988     Quit date: 2008     Years since quittin.3    Smokeless tobacco: Never   Vaping Use    Vaping  "status: Never Used   Substance and Sexual Activity    Alcohol use: Yes     Comment: 1/ month    Drug use: No    Sexual activity: Not on file     Current Outpatient Medications on File Prior to Visit   Medication Sig    ascorbic acid (VITAMIN C) 500 mg tablet Take 500 mg by mouth daily    aspirin (ECOTRIN LOW STRENGTH) 81 mg EC tablet Take 81 mg by mouth daily    cholecalciferol (VITAMIN D3) 1,000 units tablet Take 1,000 Units by mouth daily    Continuous Blood Gluc Sensor (FreeStyle Adry 14 Day Sensor) MISC q 14 days    imipramine (TOFRANIL) 50 mg tablet Take 1 tablet (50 mg total) by mouth daily    Insulin Glargine Solostar (Lantus SoloStar) 100 UNIT/ML SOPN INJECT 44 UNITS UNDER THE SKIN IN THE MORNING AND 32 UNITS IN THE EVENING    Insulin Pen Needle (CareOne Unifine Pentips Plus) 31G X 5 MM MISC USE 5 NEEDLES DAILY    lisinopril-hydrochlorothiazide (PRINZIDE,ZESTORETIC) 20-12.5 MG per tablet Take 1 tablet by mouth daily    NovoLOG FlexPen 100 units/mL injection pen INJECT UP TO 75 UNITS UNDER THE SKIN DAILY    omeprazole (PriLOSEC) 40 MG capsule Take 1 capsule (40 mg total) by mouth daily    atorvastatin (LIPITOR) 40 mg tablet Take 1 tablet (40 mg total) by mouth daily 1 tab daily     No Known Allergies  Immunization History   Administered Date(s) Administered    COVID-19 PFIZER VACCINE 0.3 ML IM 03/16/2021, 04/06/2021    Influenza, seasonal, injectable 09/02/2014    Pneumococcal Polysaccharide PPV23 01/30/2017    Tdap 04/02/2018     Objective     /70 (BP Location: Left arm, Patient Position: Sitting, Cuff Size: Standard)   Pulse 90   Temp 97.7 °F (36.5 °C) (Tympanic)   Ht 5' 10\" (1.778 m)   Wt 81.2 kg (179 lb)   SpO2 99%   BMI 25.68 kg/m²     Physical Exam  Vitals reviewed.   Constitutional:       Appearance: Normal appearance. He is well-developed.   Cardiovascular:      Rate and Rhythm: Normal rate and regular rhythm.      Pulses:           Carotid pulses are 2+ on the right side and 2+ on the " left side.     Heart sounds: Normal heart sounds. No murmur heard.  Pulmonary:      Effort: Pulmonary effort is normal.      Breath sounds: Normal breath sounds.   Skin:     General: Skin is warm and dry.   Neurological:      Mental Status: He is alert and oriented to person, place, and time.   Psychiatric:         Mood and Affect: Mood normal.         Behavior: Behavior normal.         Thought Content: Thought content normal.         Judgment: Judgment normal.

## 2024-08-20 DIAGNOSIS — I10 ESSENTIAL HYPERTENSION: ICD-10-CM

## 2024-08-20 RX ORDER — LISINOPRIL AND HYDROCHLOROTHIAZIDE 12.5; 2 MG/1; MG/1
1 TABLET ORAL DAILY
Qty: 30 TABLET | Refills: 5 | Status: SHIPPED | OUTPATIENT
Start: 2024-08-20

## 2024-09-10 ENCOUNTER — TELEPHONE (OUTPATIENT)
Dept: FAMILY MEDICINE CLINIC | Facility: HOSPITAL | Age: 59
End: 2024-09-10

## 2024-09-10 ENCOUNTER — TELEPHONE (OUTPATIENT)
Age: 59
End: 2024-09-10

## 2024-09-10 NOTE — TELEPHONE ENCOUNTER
Amb referral already in chart for Ophthalmology - [facility].    Please enter new [facility] insurance referral using npi [id number].

## 2024-09-10 NOTE — TELEPHONE ENCOUNTER
Patient call about the insurance referral in the system need to be renew because the patient has the appointment tomorrow. The referral number is  [id number]. Please assist the patient updating the referral for the patient. Thank you

## 2024-09-11 LAB
LEFT EYE DIABETIC RETINOPATHY: POSITIVE
RIGHT EYE DIABETIC RETINOPATHY: POSITIVE

## 2024-09-26 ENCOUNTER — OFFICE VISIT (OUTPATIENT)
Dept: ENDOCRINOLOGY | Facility: HOSPITAL | Age: 59
End: 2024-09-26
Payer: COMMERCIAL

## 2024-09-26 VITALS
BODY MASS INDEX: 25.91 KG/M2 | OXYGEN SATURATION: 98 % | DIASTOLIC BLOOD PRESSURE: 82 MMHG | HEART RATE: 96 BPM | SYSTOLIC BLOOD PRESSURE: 132 MMHG | WEIGHT: 181 LBS | HEIGHT: 70 IN

## 2024-09-26 DIAGNOSIS — E11.319 TYPE 2 DIABETES MELLITUS WITH RETINOPATHY, WITH LONG-TERM CURRENT USE OF INSULIN, MACULAR EDEMA PRESENCE UNSPECIFIED, UNSPECIFIED LATERALITY, UNSPECIFIED RETINOPATHY SEVERITY (HCC): Primary | ICD-10-CM

## 2024-09-26 DIAGNOSIS — E78.5 HYPERLIPIDEMIA ASSOCIATED WITH TYPE 2 DIABETES MELLITUS  (HCC): ICD-10-CM

## 2024-09-26 DIAGNOSIS — I10 ESSENTIAL HYPERTENSION: ICD-10-CM

## 2024-09-26 DIAGNOSIS — Z79.4 TYPE 2 DIABETES MELLITUS WITH RETINOPATHY, WITH LONG-TERM CURRENT USE OF INSULIN, MACULAR EDEMA PRESENCE UNSPECIFIED, UNSPECIFIED LATERALITY, UNSPECIFIED RETINOPATHY SEVERITY (HCC): Primary | ICD-10-CM

## 2024-09-26 DIAGNOSIS — E11.69 HYPERLIPIDEMIA ASSOCIATED WITH TYPE 2 DIABETES MELLITUS  (HCC): ICD-10-CM

## 2024-09-26 DIAGNOSIS — E78.5 HYPERLIPIDEMIA, UNSPECIFIED HYPERLIPIDEMIA TYPE: ICD-10-CM

## 2024-09-26 DIAGNOSIS — E11.3293 MILD NONPROLIFERATIVE DIABETIC RETINOPATHY OF BOTH EYES WITHOUT MACULAR EDEMA ASSOCIATED WITH TYPE 2 DIABETES MELLITUS (HCC): ICD-10-CM

## 2024-09-26 PROCEDURE — 99214 OFFICE O/P EST MOD 30 MIN: CPT | Performed by: NURSE PRACTITIONER

## 2024-09-26 PROCEDURE — 95251 CONT GLUC MNTR ANALYSIS I&R: CPT | Performed by: NURSE PRACTITIONER

## 2024-09-26 RX ORDER — PEN NEEDLE, DIABETIC 31 GX3/16"
NEEDLE, DISPOSABLE MISCELLANEOUS
Qty: 500 EACH | Refills: 3 | Status: SHIPPED | OUTPATIENT
Start: 2024-09-26

## 2024-09-26 RX ORDER — INSULIN ASPART 100 [IU]/ML
INJECTION, SOLUTION INTRAVENOUS; SUBCUTANEOUS
Qty: 30 ML | Refills: 6 | Status: SHIPPED | OUTPATIENT
Start: 2024-09-26

## 2024-09-26 RX ORDER — INSULIN GLARGINE 100 [IU]/ML
INJECTION, SOLUTION SUBCUTANEOUS
Qty: 30 ML | Refills: 6 | Status: SHIPPED | OUTPATIENT
Start: 2024-09-26

## 2024-09-26 NOTE — PROGRESS NOTES
Alberto Senior 59 y.o. male MRN: [id number]    Encounter: [id number]      Assessment & Plan     Assessment:  This is a 59 y.o.-year-old male with type 2 diabetes with retinopathy, hypertension and hyperlipidemia.       Plan:  1.  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: His most recent hemoglobin A1c is improved to 7.3.   For now, continue current regimen.  Did discuss adjusting his insulin to carbohydrate ratio at dinnertime when he is traveling to 1:6 as he typically eats larger meals and experiences hyperglycemia after dinner and throughout the evening.  He will continue to utilize the freestyle jeremy and for the report to the office in 2 weeks for review and further adjustment, if necessary.  Check hemoglobin A1c prior to next visit.        2.  Hypertension: Continue lisinopril-HCTZ.  Check comprehensive metabolic panel prior to next visit.     3.  Hyperlipidemia: Continue atorvastatin. Check fasting lipid panel prior to next visit.    CC: Type 2 Diabetes follow up    History of Present Illness     HPI:  59 y.o. year old male with type 2 diabetes for approximately 20 years.  He is on insulin at home and takes Lantus 32 units in the evening and 44 units in the morning with NovoLog 1 unit for every 10 g of carbs with breakfast and lunch with 1:8 at dinner and snack.  Her most recent hemoglobin A1c from August 1 2024 is 7.3. He states that his blood sugar readings have been high at times throughout the fall as he has been traveling for work and has been eating out more with snacking. He denies any recent episodes of hypoglycemia, polyuria, polydipsia, nocturia and blurry vision.  He denies neuropathy but does admit to neuropathy and retinopathy.      He obtained his annual diabetic eye exam in August 2023, by his report.  He has mild retinopathy. He is scheduled with his retina specialist tomorrow. He is getting injections to treat retinopathy. He complains of some discomfort to his feet  intermittently but does not follow podiatry for regular diabetic foot care.  Diabetic foot exam was performed at last office visit on August 9, 2023.       Blood Sugar/Glucometer/Pump/CGM review: Freestyle jeremy download from September 12 through September 16, 2024 reveals an average glucose of 158 with a glucose variability of 24.1.  He is in target range 73% of the time with 28% elevated readings and no hypoglycemia.     For his hypertension, he is treated with lisinopril 10 mg daily.  He states his compliance with his lisinopril has improved.     His hyperlipidemia is treated with atorvastatin 40 mg daily.     Review of Systems   Constitutional: Negative.  Negative for chills, fatigue and fever.   HENT:  Positive for hearing loss. Negative for trouble swallowing and voice change.    Eyes:  Negative for photophobia, pain, discharge, redness, itching and visual disturbance.   Respiratory:  Negative for cough and shortness of breath.    Cardiovascular:  Negative for chest pain and palpitations.   Gastrointestinal:  Negative for abdominal pain, constipation, diarrhea, nausea and vomiting.   Endocrine: Negative for cold intolerance, heat intolerance, polydipsia, polyphagia and polyuria.   Genitourinary: Negative.    Musculoskeletal: Negative.    Skin: Negative.    Allergic/Immunologic: Negative.    Neurological:  Negative for dizziness, syncope, light-headedness and headaches.   Hematological: Negative.    Psychiatric/Behavioral: Negative.     All other systems reviewed and are negative.      Historical Information   Past Medical History:   Diagnosis Date    Diabetes (HCC)     type 2    GERD (gastroesophageal reflux disease)     Hypertension      Past Surgical History:   Procedure Laterality Date    CATARACT EXTRACTION, BILATERAL      NO PAST SURGERIES       Social History   Social History     Substance and Sexual Activity   Alcohol Use Yes    Comment: 1/ month     Social History     Substance and Sexual Activity  "  Drug Use No     Social History     Tobacco Use   Smoking Status Former    Current packs/day: 0.00    Average packs/day: 1 pack/day for 20.0 years (20.0 ttl pk-yrs)    Types: Cigarettes    Start date: 1988    Quit date: 2008    Years since quittin.4   Smokeless Tobacco Never     Family History:   Family History   Adopted: Yes   Family history unknown: Yes       Meds/Allergies   Current Outpatient Medications   Medication Sig Dispense Refill    ascorbic acid (VITAMIN C) 500 mg tablet Take 500 mg by mouth daily      aspirin (ECOTRIN LOW STRENGTH) 81 mg EC tablet Take 81 mg by mouth daily      atorvastatin (LIPITOR) 40 mg tablet Take 1 tablet (40 mg total) by mouth daily 1 tab daily 30 tablet 11    cholecalciferol (VITAMIN D3) 1,000 units tablet Take 1,000 Units by mouth daily      Continuous Blood Gluc Sensor (FreeStyle Adry 14 Day Sensor) MISC q 14 days 2 each 11    imipramine (TOFRANIL) 50 mg tablet Take 1 tablet (50 mg total) by mouth daily 90 tablet 1    Insulin Glargine Solostar (Lantus SoloStar) 100 UNIT/ML SOPN INJECT 44 UNITS UNDER THE SKIN IN THE MORNING AND 32 UNITS IN THE EVENING 30 mL 3    Insulin Pen Needle (CareOne Unifine Pentips Plus) 31G X 5 MM MISC USE 5 NEEDLES DAILY 500 each 3    lisinopril-hydrochlorothiazide (PRINZIDE,ZESTORETIC) 20-12.5 MG per tablet Take 1 tablet by mouth once daily 30 tablet 5    NovoLOG FlexPen 100 units/mL injection pen INJECT UP TO 75 UNITS UNDER THE SKIN DAILY 30 mL 6    omeprazole (PriLOSEC) 40 MG capsule Take 1 capsule (40 mg total) by mouth daily 90 capsule 3     No current facility-administered medications for this visit.     No Known Allergies    Objective   Vitals: Blood pressure 132/82, pulse 96, height 5' 10\" (1.778 m), weight 82.1 kg (181 lb), SpO2 98%.    Physical Exam  Vitals reviewed.   Constitutional:       Appearance: He is well-developed.   HENT:      Head: Normocephalic and atraumatic.      Nose: Nose normal.   Eyes:      Conjunctiva/sclera: " Conjunctivae normal.      Pupils: Pupils are equal, round, and reactive to light.   Cardiovascular:      Rate and Rhythm: Normal rate and regular rhythm.      Heart sounds: Normal heart sounds.   Pulmonary:      Effort: Pulmonary effort is normal.      Breath sounds: Normal breath sounds.   Abdominal:      General: Bowel sounds are normal.      Palpations: Abdomen is soft.   Musculoskeletal:         General: Normal range of motion.      Cervical back: Normal range of motion and neck supple.   Skin:     General: Skin is warm and dry.   Neurological:      Mental Status: He is alert and oriented to person, place, and time.   Psychiatric:         Behavior: Behavior normal.         Thought Content: Thought content normal.         Judgment: Judgment normal.         Lab Results:   Lab Results   Component Value Date/Time    Hemoglobin A1C 7.3 (H) 08/01/2024 09:27 AM    Hemoglobin A1C 7.7 (H) 04/18/2024 09:06 AM    Hemoglobin A1C 8.1 (H) 01/17/2024 08:39 AM    White Blood Cell Count 5.5 08/01/2024 09:27 AM    White Blood Cell Count 6.3 04/18/2024 09:06 AM    White Blood Cell Count 5.0 01/17/2024 08:39 AM    Hemoglobin 14.0 08/01/2024 09:27 AM    Hemoglobin 14.3 04/18/2024 09:06 AM    Hemoglobin 14.6 01/17/2024 08:39 AM    HCT 41.2 08/01/2024 09:27 AM    HCT 44.6 04/18/2024 09:06 AM    HCT 43.6 01/17/2024 08:39 AM    MCV 89 08/01/2024 09:27 AM    MCV 89 04/18/2024 09:06 AM    MCV 90 01/17/2024 08:39 AM    Platelet Count 248 08/01/2024 09:27 AM    Platelet Count 272 04/18/2024 09:06 AM    Platelet Count 259 01/17/2024 08:39 AM    BUN 17 08/01/2024 09:27 AM    BUN 19 04/18/2024 09:06 AM    BUN 15 01/17/2024 08:39 AM    Potassium 4.6 08/01/2024 09:27 AM    Potassium 4.6 04/18/2024 09:06 AM    Potassium 4.4 01/17/2024 08:39 AM    Chloride 96 08/01/2024 09:27 AM    Chloride 102 04/18/2024 09:06 AM    Chloride 100 01/17/2024 08:39 AM    CO2 25 08/01/2024 09:27 AM    CO2 24 04/18/2024 09:06 AM    CO2 25 01/17/2024 08:39 AM     "Creatinine 1.09 08/01/2024 09:27 AM    Creatinine 1.11 04/18/2024 09:06 AM    Creatinine 1.07 01/17/2024 08:39 AM    AST 25 08/01/2024 09:27 AM    AST 25 04/18/2024 09:06 AM    AST 27 01/17/2024 08:39 AM    ALT 31 08/01/2024 09:27 AM    ALT 27 04/18/2024 09:06 AM    ALT 31 01/17/2024 08:39 AM    Protein, Total 6.9 08/01/2024 09:27 AM    Protein, Total 6.8 04/18/2024 09:06 AM    Protein, Total 6.9 01/17/2024 08:39 AM    Albumin 4.6 08/01/2024 09:27 AM    Albumin 4.3 04/18/2024 09:06 AM    Albumin 4.8 01/17/2024 08:39 AM    Globulin, Total 2.3 08/01/2024 09:27 AM    Globulin, Total 2.5 04/18/2024 09:06 AM    Globulin, Total 2.1 01/17/2024 08:39 AM    HDL 40 04/18/2024 09:06 AM    HDL 46 10/04/2023 09:35 AM    Triglycerides 96 04/18/2024 09:06 AM    Triglycerides 103 10/04/2023 09:35 AM         Portions of the record may have been created with voice recognition software. Occasional wrong word or \"sound a like\" substitutions may have occurred due to the inherent limitations of voice recognition software. Read the chart carefully and recognize, using context, where substitutions have occurred.    "

## 2024-09-26 NOTE — PATIENT INSTRUCTIONS
Be mindful of diet.      Exercise regularly and stay hydrated.      Continue NovoLog at current dose.     Continue morning Lantus 44 units and 32 units in the evening.     As discussed, when traveling and eating larger dinners, please utilize a 1:6 insulin to carbohydrate ratio.     Make sure you are injecting insulin consistently BEFORE MEALS.     Continue to utilize the freestyle jeremy.     Contact the office with any consistent hypoglycemia.     Continue Lisinopril HCTZ.     Send a record of your blood pressures to the office in 2-3 weeks for review.     Continue atorvastatin.     Obtain lab work prior to next visit.

## 2024-10-17 ENCOUNTER — TELEPHONE (OUTPATIENT)
Dept: ADMINISTRATIVE | Facility: OTHER | Age: 59
End: 2024-10-17

## 2024-10-17 NOTE — TELEPHONE ENCOUNTER
Upon review of the In Basket request we  found  is up to date.     Any additional questions or concerns should be emailed to the Practice Liaisons via the appropriate education email address, please do not reply via In Basket.    Thank you  Jaswinder Menjivar MA   [facility]

## 2024-10-17 NOTE — TELEPHONE ENCOUNTER
----- Message from Ashley IBARRA sent at 10/17/2024  9:58 AM EDT -----  Regarding: dm eye exam  10/17/24 10:01 AM    Hello, our patient Alberto Senior has had Diabetic Eye Exam completed/performed. Please assist in updating the patient chart by pulling the document from the Media Tab. The date of service is 04/10/2024.     Thank you,  Ashley Paris MA  [facility] [address]

## 2024-11-13 LAB
LEFT EYE DIABETIC RETINOPATHY: POSITIVE
RIGHT EYE DIABETIC RETINOPATHY: POSITIVE

## 2024-11-18 ENCOUNTER — OFFICE VISIT (OUTPATIENT)
Dept: FAMILY MEDICINE CLINIC | Facility: HOSPITAL | Age: 59
End: 2024-11-18
Payer: COMMERCIAL

## 2024-11-18 VITALS
DIASTOLIC BLOOD PRESSURE: 86 MMHG | OXYGEN SATURATION: 98 % | WEIGHT: 184.8 LBS | TEMPERATURE: 98.2 F | SYSTOLIC BLOOD PRESSURE: 144 MMHG | BODY MASS INDEX: 26.52 KG/M2 | HEART RATE: 96 BPM

## 2024-11-18 DIAGNOSIS — K22.70 BARRETT'S ESOPHAGUS DETERMINED BY ENDOSCOPY: ICD-10-CM

## 2024-11-18 DIAGNOSIS — E78.5 HYPERLIPIDEMIA ASSOCIATED WITH TYPE 2 DIABETES MELLITUS  (HCC): ICD-10-CM

## 2024-11-18 DIAGNOSIS — R12 PYROSIS: ICD-10-CM

## 2024-11-18 DIAGNOSIS — E11.69 HYPERLIPIDEMIA ASSOCIATED WITH TYPE 2 DIABETES MELLITUS  (HCC): ICD-10-CM

## 2024-11-18 DIAGNOSIS — E11.319 TYPE 2 DIABETES MELLITUS WITH RETINOPATHY, WITH LONG-TERM CURRENT USE OF INSULIN, MACULAR EDEMA PRESENCE UNSPECIFIED, UNSPECIFIED LATERALITY, UNSPECIFIED RETINOPATHY SEVERITY (HCC): ICD-10-CM

## 2024-11-18 DIAGNOSIS — I10 ESSENTIAL HYPERTENSION: Primary | ICD-10-CM

## 2024-11-18 DIAGNOSIS — Z79.4 TYPE 2 DIABETES MELLITUS WITH RETINOPATHY, WITH LONG-TERM CURRENT USE OF INSULIN, MACULAR EDEMA PRESENCE UNSPECIFIED, UNSPECIFIED LATERALITY, UNSPECIFIED RETINOPATHY SEVERITY (HCC): ICD-10-CM

## 2024-11-18 PROCEDURE — 99214 OFFICE O/P EST MOD 30 MIN: CPT | Performed by: NURSE PRACTITIONER

## 2024-11-18 RX ORDER — OMEPRAZOLE 40 MG/1
40 CAPSULE, DELAYED RELEASE ORAL DAILY
Qty: 90 CAPSULE | Refills: 3 | Status: SHIPPED | OUTPATIENT
Start: 2024-11-18 | End: 2025-11-13

## 2024-11-18 NOTE — ASSESSMENT & PLAN NOTE
Lab Results   Component Value Date    HGBA1C 7.3 (H) 08/01/2024   Managed by endo.   A1C has improved to 7.3  UTD with foot and eye exam.   F/U in 6 months.

## 2024-11-18 NOTE — ASSESSMENT & PLAN NOTE
Bp is elevated even on recheck.   He provides averages of BP readings from home.   No change in regimen.   Instructed to check BP daily for 1 week and send message via My Chart with readings.   Plan to f/u in 6 months.

## 2024-11-18 NOTE — ASSESSMENT & PLAN NOTE
Lab Results   Component Value Date    HGBA1C 7.3 (H) 08/01/2024   He is c/o myalgias.   Instructed to stop statin for 1 week. If myalgias resolve then consider lower dose, switch to different or trial of Co-enzyme Q10.   He will let me know.

## 2024-11-18 NOTE — PROGRESS NOTES
Name: Alberto Senior      : 1965      MRN: [id number]  Encounter Provider: PREETHI Hall  Encounter Date: 2024   Encounter department: [facility] [address]   :  Assessment & Plan  Essential hypertension  Bp is elevated even on recheck.   He provides averages of BP readings from home.   No change in regimen.   Instructed to check BP daily for 1 week and send message via My Chart with readings.   Plan to f/u in 6 months.        Type 2 diabetes mellitus with retinopathy, with long-term current use of insulin, macular edema presence unspecified, unspecified laterality, unspecified retinopathy severity ([facility])    Lab Results   Component Value Date    HGBA1C 7.3 (H) 2024   Managed by endo.   A1C has improved to 7.3  UTD with foot and eye exam.   F/U in 6 months.          Hyperlipidemia associated with type 2 diabetes mellitus  (HCC)    Lab Results   Component Value Date    HGBA1C 7.3 (H) 2024   He is c/o myalgias.   Instructed to stop statin for 1 week. If myalgias resolve then consider lower dose, switch to different or trial of Co-enzyme Q10.   He will let me know.                 History of Present Illness     He is doing well.   Has f/u with endo in January with labs preceding. Last A1c was improved to 7.3.   Checking BP at home every few days. He wrote down averages which run 120-150s. Highest in the AM.   He has been experiencing muscle aches.         Review of Systems   Constitutional:  Negative for fatigue and unexpected weight change.   Eyes:  Negative for visual disturbance.   Respiratory:  Negative for shortness of breath.    Cardiovascular:  Negative for chest pain, palpitations and leg swelling.   Musculoskeletal:  Positive for myalgias.   Neurological:  Negative for dizziness, weakness, light-headedness, numbness and headaches.          Objective   /86 (Patient Position: Sitting, Cuff Size: Standard)   Pulse 96   Temp 98.2 °F (36.8 °C) (Tympanic)    Wt 83.8 kg (184 lb 12.8 oz)   SpO2 98%   BMI 26.52 kg/m²      Physical Exam  Vitals reviewed.   Constitutional:       Appearance: Normal appearance. He is well-developed.   Cardiovascular:      Rate and Rhythm: Normal rate and regular rhythm.      Pulses:           Carotid pulses are 2+ on the right side and 2+ on the left side.     Heart sounds: Normal heart sounds. No murmur heard.  Pulmonary:      Effort: Pulmonary effort is normal.      Breath sounds: Normal breath sounds.   Skin:     General: Skin is warm and dry.   Neurological:      Mental Status: He is alert and oriented to person, place, and time.   Psychiatric:         Mood and Affect: Mood normal.         Behavior: Behavior normal.         Thought Content: Thought content normal.         Judgment: Judgment normal.       Administrative Statements   I have spent a total time of 20 minutes in caring for this patient on the day of the visit/encounter including Instructions for management, Documenting in the medical record, Reviewing / ordering tests, medicine, procedures  , and Obtaining or reviewing history  .

## 2024-11-27 ENCOUNTER — TELEPHONE (OUTPATIENT)
Dept: FAMILY MEDICINE CLINIC | Facility: HOSPITAL | Age: 59
End: 2024-11-27

## 2024-12-02 DIAGNOSIS — I10 ESSENTIAL HYPERTENSION: Primary | ICD-10-CM

## 2024-12-02 RX ORDER — HYDROCHLOROTHIAZIDE 12.5 MG/1
12.5 TABLET ORAL DAILY
Qty: 30 TABLET | Refills: 1 | Status: SHIPPED | OUTPATIENT
Start: 2024-12-02 | End: 2025-05-31

## 2024-12-17 ENCOUNTER — TELEPHONE (OUTPATIENT)
Dept: FAMILY MEDICINE CLINIC | Facility: HOSPITAL | Age: 59
End: 2024-12-17

## 2024-12-17 NOTE — TELEPHONE ENCOUNTER
----- Message from PREETHI Hall sent at 12/17/2024  8:20 AM EST -----  Please call pt for my recent BP readings.

## 2024-12-24 ENCOUNTER — TELEPHONE (OUTPATIENT)
Age: 59
End: 2024-12-24

## 2024-12-26 ENCOUNTER — DOCUMENTATION (OUTPATIENT)
Dept: ADMINISTRATIVE | Facility: OTHER | Age: 59
End: 2024-12-26

## 2024-12-26 NOTE — PROGRESS NOTES
Janee Preciado, RN routed conversation to Patient Reported Team2 days ago     Janee Preciado RN2 days ago     AS     Date Morning Midday Evening   12/16/24 1:30 132/75, 10:00 134/72   12/17/24 2:00 121/68, 10:30 130/69   12/18/24 10:30 132/80   12/19/24 12/20/24 11:00 138/87,  9:30 131/67   12/21/24 11:00 130/73   12/22/24 1:30 129/70      Alberto Senior, III 1965          Note        Obed Senior, MARY ELLEN2 days ago

## 2024-12-30 VITALS — SYSTOLIC BLOOD PRESSURE: 129 MMHG | DIASTOLIC BLOOD PRESSURE: 70 MMHG

## 2025-01-18 LAB
ALBUMIN SERPL-MCNC: 4.5 G/DL (ref 3.9–4.9)
ALP SERPL-CCNC: 54 IU/L (ref 44–121)
ALT SERPL-CCNC: 35 IU/L (ref 0–44)
AST SERPL-CCNC: 32 IU/L (ref 0–40)
BASOPHILS # BLD AUTO: 0.1 X10E3/UL (ref 0–0.2)
BASOPHILS NFR BLD AUTO: 1 %
BILIRUB SERPL-MCNC: 0.4 MG/DL (ref 0–1.2)
BUN SERPL-MCNC: 23 MG/DL (ref 8–27)
BUN/CREAT SERPL: 19 (ref 10–24)
CALCIUM SERPL-MCNC: 9 MG/DL (ref 8.6–10.2)
CHLORIDE SERPL-SCNC: 95 MMOL/L (ref 96–106)
CHOLEST SERPL-MCNC: 188 MG/DL (ref 100–199)
CHOLEST/HDLC SERPL: 4.3 RATIO (ref 0–5)
CO2 SERPL-SCNC: 24 MMOL/L (ref 20–29)
CREAT SERPL-MCNC: 1.19 MG/DL (ref 0.76–1.27)
EGFR: 69 ML/MIN/1.73
EOSINOPHIL # BLD AUTO: 0.2 X10E3/UL (ref 0–0.4)
EOSINOPHIL NFR BLD AUTO: 3 %
ERYTHROCYTE [DISTWIDTH] IN BLOOD BY AUTOMATED COUNT: 12.9 % (ref 11.6–15.4)
EST. AVERAGE GLUCOSE BLD GHB EST-MCNC: 177 MG/DL
GLOBULIN SER-MCNC: 2.4 G/DL (ref 1.5–4.5)
GLUCOSE SERPL-MCNC: 120 MG/DL (ref 70–99)
HBA1C MFR BLD: 7.8 % (ref 4.8–5.6)
HCT VFR BLD AUTO: 40.4 % (ref 37.5–51)
HDLC SERPL-MCNC: 44 MG/DL
HGB BLD-MCNC: 13.6 G/DL (ref 13–17.7)
IMM GRANULOCYTES # BLD: 0 X10E3/UL (ref 0–0.1)
IMM GRANULOCYTES NFR BLD: 0 %
LDLC SERPL CALC-MCNC: 126 MG/DL (ref 0–99)
LYMPHOCYTES # BLD AUTO: 1.3 X10E3/UL (ref 0.7–3.1)
LYMPHOCYTES NFR BLD AUTO: 24 %
MCH RBC QN AUTO: 30.4 PG (ref 26.6–33)
MCHC RBC AUTO-ENTMCNC: 33.7 G/DL (ref 31.5–35.7)
MCV RBC AUTO: 90 FL (ref 79–97)
MONOCYTES # BLD AUTO: 0.7 X10E3/UL (ref 0.1–0.9)
MONOCYTES NFR BLD AUTO: 12 %
NEUTROPHILS # BLD AUTO: 3.3 X10E3/UL (ref 1.4–7)
NEUTROPHILS NFR BLD AUTO: 60 %
PLATELET # BLD AUTO: 299 X10E3/UL (ref 150–450)
POTASSIUM SERPL-SCNC: 4 MMOL/L (ref 3.5–5.2)
PROT SERPL-MCNC: 6.9 G/DL (ref 6–8.5)
RBC # BLD AUTO: 4.48 X10E6/UL (ref 4.14–5.8)
SL AMB VLDL CHOLESTEROL CALC: 18 MG/DL (ref 5–40)
SODIUM SERPL-SCNC: 131 MMOL/L (ref 134–144)
TRIGL SERPL-MCNC: 98 MG/DL (ref 0–149)
TSH SERPL DL<=0.005 MIU/L-ACNC: 1.64 UIU/ML (ref 0.45–4.5)
WBC # BLD AUTO: 5.5 X10E3/UL (ref 3.4–10.8)

## 2025-01-21 ENCOUNTER — RESULTS FOLLOW-UP (OUTPATIENT)
Dept: ENDOCRINOLOGY | Facility: HOSPITAL | Age: 60
End: 2025-01-21

## 2025-01-22 ENCOUNTER — OFFICE VISIT (OUTPATIENT)
Dept: ENDOCRINOLOGY | Facility: HOSPITAL | Age: 60
End: 2025-01-22
Payer: COMMERCIAL

## 2025-01-22 VITALS
WEIGHT: 187 LBS | HEART RATE: 107 BPM | HEIGHT: 70 IN | DIASTOLIC BLOOD PRESSURE: 90 MMHG | SYSTOLIC BLOOD PRESSURE: 144 MMHG | BODY MASS INDEX: 26.77 KG/M2

## 2025-01-22 DIAGNOSIS — E11.319 TYPE 2 DIABETES MELLITUS WITH RETINOPATHY, WITH LONG-TERM CURRENT USE OF INSULIN, MACULAR EDEMA PRESENCE UNSPECIFIED, UNSPECIFIED LATERALITY, UNSPECIFIED RETINOPATHY SEVERITY (HCC): Primary | ICD-10-CM

## 2025-01-22 DIAGNOSIS — E11.69 HYPERLIPIDEMIA ASSOCIATED WITH TYPE 2 DIABETES MELLITUS  (HCC): ICD-10-CM

## 2025-01-22 DIAGNOSIS — E78.5 HYPERLIPIDEMIA, UNSPECIFIED HYPERLIPIDEMIA TYPE: ICD-10-CM

## 2025-01-22 DIAGNOSIS — Z79.4 TYPE 2 DIABETES MELLITUS WITH RETINOPATHY, WITH LONG-TERM CURRENT USE OF INSULIN, MACULAR EDEMA PRESENCE UNSPECIFIED, UNSPECIFIED LATERALITY, UNSPECIFIED RETINOPATHY SEVERITY (HCC): Primary | ICD-10-CM

## 2025-01-22 DIAGNOSIS — I10 ESSENTIAL HYPERTENSION: ICD-10-CM

## 2025-01-22 DIAGNOSIS — E87.1 HYPONATREMIA: ICD-10-CM

## 2025-01-22 DIAGNOSIS — E78.5 HYPERLIPIDEMIA ASSOCIATED WITH TYPE 2 DIABETES MELLITUS  (HCC): ICD-10-CM

## 2025-01-22 DIAGNOSIS — E11.3293 MILD NONPROLIFERATIVE DIABETIC RETINOPATHY OF BOTH EYES WITHOUT MACULAR EDEMA ASSOCIATED WITH TYPE 2 DIABETES MELLITUS (HCC): ICD-10-CM

## 2025-01-22 PROCEDURE — 99214 OFFICE O/P EST MOD 30 MIN: CPT | Performed by: NURSE PRACTITIONER

## 2025-01-22 PROCEDURE — 95251 CONT GLUC MNTR ANALYSIS I&R: CPT | Performed by: NURSE PRACTITIONER

## 2025-01-22 RX ORDER — FLASH GLUCOSE SENSOR
KIT MISCELLANEOUS
Qty: 2 EACH | Refills: 11 | Status: SHIPPED | OUTPATIENT
Start: 2025-01-22

## 2025-01-22 RX ORDER — INSULIN GLARGINE 100 [IU]/ML
INJECTION, SOLUTION SUBCUTANEOUS
Qty: 30 ML | Refills: 6 | Status: SHIPPED | OUTPATIENT
Start: 2025-01-22

## 2025-01-22 RX ORDER — CALCIUM CARBONATE 300MG(750)
TABLET,CHEWABLE ORAL DAILY
COMMUNITY

## 2025-01-22 RX ORDER — INSULIN ASPART 100 [IU]/ML
INJECTION, SOLUTION INTRAVENOUS; SUBCUTANEOUS
Qty: 30 ML | Refills: 6 | Status: SHIPPED | OUTPATIENT
Start: 2025-01-22

## 2025-01-22 NOTE — PATIENT INSTRUCTIONS
Be mindful of diet.      Exercise regularly and stay hydrated.      Continue NovoLog at current dose.     Continue morning Lantus 44 units and increase to 35 units in the evening.     As discussed, when traveling and eating larger dinners, please utilize a 1:6 insulin to carbohydrate ratio.     Make sure you are injecting insulin consistently BEFORE MEALS.     Continue to utilize the freestyle jeremy.     Contact the office with any consistent hypoglycemia.     Continue Lisinopril HCTZ.     Send a record of your blood pressures to the office in 2-3 weeks for review.     Continue atorvastatin.    Reassess lab work in 1-2 weeks for sodium level.     Obtain lab work prior to next visit.

## 2025-01-22 NOTE — PROGRESS NOTES
Alberto Senior 59 y.o. male MRN: [id number]    Encounter: [id number]      Assessment & Plan     Assessment:  This is a 59 y.o.-year-old male with type 2 diabetes with retinopathy, hypertension and hyperlipidemia.       Plan:  1.  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: His most recent hemoglobin A1c is 7.8.   For now, continue current regimen but increase evening Lantus to 35 units to help with morning blood sugar.  Refocus on diet and exercise.  He will continue to utilize the freestyle jeremy and for the report to the office in 2 weeks for review and further adjustment, if necessary.  Check hemoglobin A1c prior to next visit.        2.  Hypertension: He is continuing to follow up with his PCP for management.  Continue lisinopril-HCTZ.  Check comprehensive metabolic panel prior to next visit.     3.  Hyperlipidemia: LDL is elevated.  PCP requested that he stop atorvastatin due to leg cramps.     CC: Type 2 Diabetes follow up    History of Present Illness     HPI:  59 y.o. year old male with type 2 diabetes for approximately 20 years.  He is on insulin at home and takes Lantus 32 units in the evening and 44 units in the morning with NovoLog 1 unit for every 10 g of carbs with breakfast and lunch with 1:8 at dinner and snack.  Her most recent hemoglobin A1c from January 17, 2025 is 7.8. He states that his blood sugar readings have been high at times throughout the fall as he has been traveling for work and has been eating out more with snacking. He denies any recent episodes of hypoglycemia, polyuria, polydipsia, nocturia and blurry vision.  He denies neuropathy but does admit to neuropathy and retinopathy.      He obtained his annual diabetic eye exam in August 2023, by his report.  He has mild retinopathy. He is scheduled with his retina specialist tomorrow. He is getting injections to treat retinopathy. He complains of some discomfort to his feet intermittently but does not follow podiatry for  regular diabetic foot care.  Diabetic foot exam was performed at last office visit on June 20, 2024.       Blood Sugar/Glucometer/Pump/CGM review: Freestyle jeremy download from January 9 through January 22, 2025 reveals an average glucose of 151 with a glucose variability of 24.1.  He is in target range 80% of the time with 20% elevated readings and no hypoglycemia.     For his hypertension, he is treated with lisinopril 10 mg daily.  He states his compliance with his lisinopril has improved.     His hyperlipidemia is treated with atorvastatin 40 mg daily.     Review of Systems   Constitutional: Negative.  Negative for chills, fatigue and fever.   HENT:  Positive for hearing loss. Negative for trouble swallowing and voice change.    Eyes:  Negative for photophobia, pain, discharge, redness, itching and visual disturbance.   Respiratory:  Negative for cough and shortness of breath.    Cardiovascular:  Negative for chest pain and palpitations.   Gastrointestinal:  Negative for abdominal pain, constipation, diarrhea, nausea and vomiting.   Endocrine: Negative for cold intolerance, heat intolerance, polydipsia, polyphagia and polyuria.   Genitourinary: Negative.    Musculoskeletal:  Positive for myalgias (leg cramps).   Skin: Negative.    Allergic/Immunologic: Negative.    Neurological:  Negative for dizziness, syncope, light-headedness and headaches.   Hematological: Negative.    Psychiatric/Behavioral: Negative.     All other systems reviewed and are negative.      Historical Information   Past Medical History:   Diagnosis Date    Diabetes (HCC)     type 2    GERD (gastroesophageal reflux disease)     Hypertension      Past Surgical History:   Procedure Laterality Date    CATARACT EXTRACTION, BILATERAL      NO PAST SURGERIES       Social History   Social History     Substance and Sexual Activity   Alcohol Use Yes    Comment: 1/ month     Social History     Substance and Sexual Activity   Drug Use No     Social History      Tobacco Use   Smoking Status Former    Current packs/day: 0.00    Average packs/day: 1 pack/day for 20.0 years (20.0 ttl pk-yrs)    Types: Cigarettes    Start date: 1988    Quit date: 2008    Years since quittin.8   Smokeless Tobacco Never     Family History:   Family History   Adopted: Yes   Family history unknown: Yes       Meds/Allergies   Current Outpatient Medications   Medication Sig Dispense Refill    ascorbic acid (VITAMIN C) 500 mg tablet Take 500 mg by mouth daily      aspirin (ECOTRIN LOW STRENGTH) 81 mg EC tablet Take 81 mg by mouth daily      atorvastatin (LIPITOR) 40 mg tablet Take 1 tablet (40 mg total) by mouth daily 1 tab daily 30 tablet 11    cholecalciferol (VITAMIN D3) 1,000 units tablet Take 1,000 Units by mouth daily      Continuous Blood Gluc Sensor (FreeStyle Adry 14 Day Sensor) MISC q 14 days 2 each 11    hydroCHLOROthiazide 12.5 mg tablet Take 1 tablet (12.5 mg total) by mouth daily 30 tablet 1    imipramine (TOFRANIL) 50 mg tablet Take 1 tablet (50 mg total) by mouth daily 90 tablet 1    Insulin Glargine Solostar (Lantus SoloStar) 100 UNIT/ML SOPN INJECT 44 UNITS UNDER THE SKIN IN THE MORNING AND 32 UNITS IN THE EVENING 30 mL 6    Insulin Pen Needle ([employer] Unifine Pentips Plus) 31G X 5 MM MISC USE 5 NEEDLES DAILY 500 each 3    lisinopril-hydrochlorothiazide (PRINZIDE,ZESTORETIC) 20-12.5 MG per tablet Take 1 tablet by mouth once daily 30 tablet 5    NovoLOG FlexPen 100 units/mL injection pen INJECT UP TO 75 UNITS UNDER THE SKIN DAILY 30 mL 6    omeprazole (PriLOSEC) 40 MG capsule Take 1 capsule (40 mg total) by mouth daily 90 capsule 3     No current facility-administered medications for this visit.     No Known Allergies    Objective   Vitals: There were no vitals taken for this visit.    Physical Exam  Vitals reviewed.   Constitutional:       Appearance: He is well-developed.   HENT:      Head: Normocephalic and atraumatic.      Nose: Nose normal.   Eyes:       Conjunctiva/sclera: Conjunctivae normal.      Pupils: Pupils are equal, round, and reactive to light.   Cardiovascular:      Rate and Rhythm: Normal rate and regular rhythm.      Heart sounds: Normal heart sounds.   Pulmonary:      Effort: Pulmonary effort is normal.      Breath sounds: Normal breath sounds.   Abdominal:      General: Bowel sounds are normal.      Palpations: Abdomen is soft.   Musculoskeletal:         General: Normal range of motion.      Cervical back: Normal range of motion and neck supple.   Skin:     General: Skin is warm and dry.   Neurological:      Mental Status: He is alert and oriented to person, place, and time.   Psychiatric:         Behavior: Behavior normal.         Thought Content: Thought content normal.         Judgment: Judgment normal.         Lab Results:   Lab Results   Component Value Date/Time    Hemoglobin A1C 7.8 (H) 01/17/2025 09:33 AM    Hemoglobin A1C 7.3 (H) 08/01/2024 09:27 AM    Hemoglobin A1C 7.7 (H) 04/18/2024 09:06 AM    White Blood Cell Count 5.5 01/17/2025 09:33 AM    White Blood Cell Count 5.5 08/01/2024 09:27 AM    White Blood Cell Count 6.3 04/18/2024 09:06 AM    Hemoglobin 13.6 01/17/2025 09:33 AM    Hemoglobin 14.0 08/01/2024 09:27 AM    Hemoglobin 14.3 04/18/2024 09:06 AM    HCT 40.4 01/17/2025 09:33 AM    HCT 41.2 08/01/2024 09:27 AM    HCT 44.6 04/18/2024 09:06 AM    MCV 90 01/17/2025 09:33 AM    MCV 89 08/01/2024 09:27 AM    MCV 89 04/18/2024 09:06 AM    Platelet Count 299 01/17/2025 09:33 AM    Platelet Count 248 08/01/2024 09:27 AM    Platelet Count 272 04/18/2024 09:06 AM    BUN 23 01/17/2025 09:33 AM    BUN 17 08/01/2024 09:27 AM    BUN 19 04/18/2024 09:06 AM    Potassium 4.0 01/17/2025 09:33 AM    Potassium 4.6 08/01/2024 09:27 AM    Potassium 4.6 04/18/2024 09:06 AM    Chloride 95 (L) 01/17/2025 09:33 AM    Chloride 96 08/01/2024 09:27 AM    Chloride 102 04/18/2024 09:06 AM    CO2 24 01/17/2025 09:33 AM    CO2 25 08/01/2024 09:27 AM    CO2 24  "04/18/2024 09:06 AM    Creatinine 1.19 01/17/2025 09:33 AM    Creatinine 1.09 08/01/2024 09:27 AM    Creatinine 1.11 04/18/2024 09:06 AM    AST 32 01/17/2025 09:33 AM    AST 25 08/01/2024 09:27 AM    AST 25 04/18/2024 09:06 AM    ALT 35 01/17/2025 09:33 AM    ALT 31 08/01/2024 09:27 AM    ALT 27 04/18/2024 09:06 AM    Protein, Total 6.9 01/17/2025 09:33 AM    Protein, Total 6.9 08/01/2024 09:27 AM    Protein, Total 6.8 04/18/2024 09:06 AM    Albumin 4.5 01/17/2025 09:33 AM    Albumin 4.6 08/01/2024 09:27 AM    Albumin 4.3 04/18/2024 09:06 AM    Globulin, Total 2.4 01/17/2025 09:33 AM    Globulin, Total 2.3 08/01/2024 09:27 AM    Globulin, Total 2.5 04/18/2024 09:06 AM    HDL 44 01/17/2025 09:33 AM    HDL 40 04/18/2024 09:06 AM    Triglycerides 98 01/17/2025 09:33 AM    Triglycerides 96 04/18/2024 09:06 AM       Portions of the record may have been created with voice recognition software. Occasional wrong word or \"sound a like\" substitutions may have occurred due to the inherent limitations of voice recognition software. Read the chart carefully and recognize, using context, where substitutions have occurred.    "

## 2025-01-28 DIAGNOSIS — F41.0 PANIC DISORDER: ICD-10-CM

## 2025-01-28 DIAGNOSIS — I10 ESSENTIAL HYPERTENSION: ICD-10-CM

## 2025-01-29 RX ORDER — HYDROCHLOROTHIAZIDE 12.5 MG/1
12.5 TABLET ORAL DAILY
Qty: 30 TABLET | Refills: 1 | Status: SHIPPED | OUTPATIENT
Start: 2025-01-29

## 2025-01-29 RX ORDER — IMIPRAMINE HYDROCHLORIDE 50 MG/1
50 TABLET, FILM COATED ORAL DAILY
Qty: 90 TABLET | Refills: 1 | Status: SHIPPED | OUTPATIENT
Start: 2025-01-29

## 2025-01-31 ENCOUNTER — TELEPHONE (OUTPATIENT)
Age: 60
End: 2025-01-31

## 2025-01-31 NOTE — TELEPHONE ENCOUNTER
Patient is requesting an insurance referral for the following specialty:      Test Name / Order Name: Follow up for regular treatment    DX Code: E11.3293 (ICD-10-CM)     Date Of Service: 2/5/25    Location/Facility Name/Address/Phone #:          [facility]         Specialty: Ophthalmology  Location / Facility NPI:          [id number]   Best Phone # To Reach The Patient:

## 2025-02-05 LAB
LEFT EYE DIABETIC RETINOPATHY: POSITIVE
RIGHT EYE DIABETIC RETINOPATHY: POSITIVE

## 2025-02-12 LAB
ALBUMIN SERPL-MCNC: 4.4 G/DL (ref 3.9–4.9)
ALP SERPL-CCNC: 55 IU/L (ref 44–121)
ALT SERPL-CCNC: 32 IU/L (ref 0–44)
AST SERPL-CCNC: 27 IU/L (ref 0–40)
BILIRUB SERPL-MCNC: 0.3 MG/DL (ref 0–1.2)
BUN SERPL-MCNC: 20 MG/DL (ref 8–27)
BUN/CREAT SERPL: 19 (ref 10–24)
CALCIUM SERPL-MCNC: 9 MG/DL (ref 8.6–10.2)
CHLORIDE SERPL-SCNC: 97 MMOL/L (ref 96–106)
CO2 SERPL-SCNC: 25 MMOL/L (ref 20–29)
CREAT SERPL-MCNC: 1.05 MG/DL (ref 0.76–1.27)
EGFR: 80 ML/MIN/1.73
GLOBULIN SER-MCNC: 2.3 G/DL (ref 1.5–4.5)
GLUCOSE SERPL-MCNC: 73 MG/DL (ref 70–99)
POTASSIUM SERPL-SCNC: 4.9 MMOL/L (ref 3.5–5.2)
PROT SERPL-MCNC: 6.7 G/DL (ref 6–8.5)
SODIUM SERPL-SCNC: 135 MMOL/L (ref 134–144)

## 2025-02-25 DIAGNOSIS — I10 ESSENTIAL HYPERTENSION: ICD-10-CM

## 2025-02-26 RX ORDER — LISINOPRIL AND HYDROCHLOROTHIAZIDE 12.5; 2 MG/1; MG/1
1 TABLET ORAL DAILY
Qty: 30 TABLET | Refills: 5 | Status: SHIPPED | OUTPATIENT
Start: 2025-02-26

## 2025-03-25 NOTE — PROGRESS NOTES
Caller returning call to Clinical Team- Connected to CMA- Laya- CONNECT CALL TO CALL CENTER CMA QUEUE- ROUTE MESSAGE TO CALL CENTER [facility] POOL ([phone]) .        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   For now, continue current regimen  Did discuss adjusting his insulin to carbohydrate ratio at dinnertime when he is traveling to 1: 8 as he typically eats larger meals and experiences hyperglycemia after dinner and throughout the evening  Loganyifan Mehta will continue to utilize the [employer] and for the report to the office in 2 weeks for review and further adjustment, if necessary   Check hemoglobin A1c prior to next visit         2   Hypertension: He is normotensive in the office today   Continue lisinopril   Check comprehensive metabolic panel prior to next visit      3   Hyperlipidemia: Improved   Continue atorvastatin   Check fasting lipid panel prior to next visit  CC: Type 2 Diabetes follow up    History of Present Illness     HPI:  59y o  year old male with type 2 diabetes for approximately 20 years   He is on insulin at home and takes Lantus 32 units in the evening and 44 units in the morning with NovoLog 1 unit for every 10 g of carbs with breakfast, lunch, dinner and snack   Her most recent hemoglobin A1c from March 17, 2023 is 8 6  He states that his blood sugar readings have been high at times throughout the fall as he has been traveling for work and has been eating out more with snacking  He denies any recent episodes of hypoglycemia, polyuria, polydipsia, nocturia and blurry vision   He denies neuropathy but does admit to neuropathy and retinopathy     He obtained his annual diabetic eye exam in Spring 2021, by his report   He has mild retinopathy  He saw his retina specialist in February 2022  He is getting injections to treat retinopathy  He complains of some discomfort to his feet intermittently but does not follow podiatry for regular diabetic foot care   Diabetic foot exam was performed at last office visit on October 17, 2022        Blood Sugar/Glucometer/Pump/CGM review: Eulogio Adamjoselin of his personal freestyle jeremy from March 10 through March 23, 2023 reveals relatively well controlled blood sugars throughout the day with some variability at times   His average glucose was 150 with a glucose variability from 28 3   He is in target range 75% of the time with 24% high and less than 1% low      For his hypertension, he is treated with lisinopril 10 mg daily   He states his compliance with his lisinopril has improved      His hyperlipidemia is treated with atorvastatin 40 mg daily       Review of Systems   Constitutional: Negative  Negative for chills, fatigue and fever  HENT: Negative  Negative for trouble swallowing and voice change  Eyes: Negative for photophobia, pain, discharge, redness, itching and visual disturbance  Respiratory: Negative for cough and shortness of breath  Cardiovascular: Negative for chest pain and palpitations  Gastrointestinal: Negative for abdominal pain, constipation, diarrhea, nausea and vomiting  Endocrine: Negative for cold intolerance, heat intolerance, polydipsia, polyphagia and polyuria  Genitourinary: Negative  Musculoskeletal: Negative  Skin: Negative  Allergic/Immunologic: Negative  Neurological: Negative for dizziness, syncope, light-headedness and headaches  Hematological: Negative  Psychiatric/Behavioral: Negative  All other systems reviewed and are negative  Historical Information   History reviewed  No pertinent past medical history    Past Surgical History:   Procedure Laterality Date   • NO PAST SURGERIES       Social History   Social History     Substance and Sexual Activity   Alcohol Use Not Currently     Social History     Substance and Sexual Activity   Drug Use No     Social History     Tobacco Use   Smoking Status Former   • Packs/day: 1 00   • Years: 20 00   • Pack years: 20 00   • Types: Cigarettes   • Quit date: 2008   • Years since quittin 9   Smokeless Tobacco Never     Family History:   Family History   Adopted: Yes   Family history unknown: Yes       Meds/Allergies   Current Outpatient Medications   Medication Sig Dispense Refill   • ascorbic acid (VITAMIN C) 500 mg tablet Take 500 mg by mouth daily     • aspirin (ECOTRIN LOW STRENGTH) 81 mg EC tablet Take 81 mg by mouth daily     • atorvastatin (LIPITOR) 40 mg tablet Take 1 tablet (40 mg total) by mouth daily 1 tab daily 30 tablet 11   • cholecalciferol (VITAMIN D3) 1,000 units tablet Take 1,000 Units by mouth daily     • Continuous Blood Gluc Sensor (FreeStyle Adry 14 Day Sensor) MISC q 14 days 2 each 11   • imipramine (TOFRANIL) 50 mg tablet TAKE ONE TABLET BY MOUTH EVERY DAY 30 tablet 11   • Insulin Glargine Solostar (Lantus SoloStar) 100 UNIT/ML SOPN INJECT 44 UNITS UNDER THE SKIN IN THE MORNING AND 32 UNITS IN THE EVENING 30 mL 2   • Insulin Pen Needle (B-D UF III MINI PEN NEEDLES) 31G X 5 MM MISC Use 5 needles daily 500 each 3   • lisinopril (ZESTRIL) 10 mg tablet Take 1 tablet (10 mg total) by mouth daily 1 tab daily 30 tablet 11   • NovoLOG FlexPen 100 units/mL injection pen INJECT UP TO 75 UNITS UNDER THE SKIN DAILY 30 mL 6     No current facility-administered medications for this visit  No Known Allergies    Objective   Vitals: Blood pressure 122/74, pulse 79, height 5' 8" (1 727 m), weight 82 8 kg (182 lb 9 6 oz)  Physical Exam  Vitals reviewed  Constitutional:       Appearance: He is well-developed  HENT:      Head: Normocephalic and atraumatic        Nose: Nose normal    Eyes: Conjunctiva/sclera: Conjunctivae normal       Pupils: Pupils are equal, round, and reactive to light  Cardiovascular:      Rate and Rhythm: Normal rate and regular rhythm  Heart sounds: Normal heart sounds  Pulmonary:      Effort: Pulmonary effort is normal       Breath sounds: Normal breath sounds  Abdominal:      General: Bowel sounds are normal       Palpations: Abdomen is soft  Musculoskeletal:         General: Normal range of motion  Cervical back: Normal range of motion and neck supple  Skin:     General: Skin is warm and dry  Neurological:      Mental Status: He is alert and oriented to person, place, and time  Psychiatric:         Behavior: Behavior normal          Thought Content:  Thought content normal          Judgment: Judgment normal        Lab Results:   Lab Results   Component Value Date/Time    Hemoglobin A1C 8 3 (H) 03/17/2023 09:19 AM    Hemoglobin A1C 7 7 (H) 12/14/2022 08:18 AM    Hemoglobin A1C 7 6 (H) 09/09/2022 09:34 AM    White Blood Cell Count 5 3 03/17/2023 09:19 AM    White Blood Cell Count 8 7 12/14/2022 08:18 AM    White Blood Cell Count 5 3 09/09/2022 09:34 AM    Hemoglobin 14 6 03/17/2023 09:19 AM    Hemoglobin 14 4 12/14/2022 08:18 AM    Hemoglobin 15 0 09/09/2022 09:34 AM    HCT 42 6 03/17/2023 09:19 AM    HCT 42 7 12/14/2022 08:18 AM    HCT 43 8 09/09/2022 09:34 AM    MCV 89 03/17/2023 09:19 AM    MCV 89 12/14/2022 08:18 AM    MCV 89 09/09/2022 09:34 AM    Platelet Count 130 00/24/5408 09:19 AM    Platelet Count 888 12/22/4629 08:18 AM    Platelet Count 851 43/58/5733 09:34 AM    BUN 14 03/17/2023 09:19 AM    BUN 23 12/14/2022 08:18 AM    BUN 19 09/09/2022 09:34 AM    Potassium 4 5 03/17/2023 09:19 AM    Potassium 4 1 12/14/2022 08:18 AM    Potassium 4 7 09/09/2022 09:34 AM    Chloride 98 03/17/2023 09:19 AM    Chloride 99 12/14/2022 08:18 AM    Chloride 102 09/09/2022 09:34 AM    CO2 25 03/17/2023 09:19 AM    CO2 25 12/14/2022 08:18 AM    CO2 25 09/09/2022 09:34 AM    Creatinine 1 07 03/17/2023 09:19 AM    Creatinine 1 12 12/14/2022 08:18 AM    Creatinine 1 07 09/09/2022 09:34 AM    AST 37 03/17/2023 09:19 AM    AST 29 12/14/2022 08:18 AM    AST 25 09/09/2022 09:34 AM    ALT 36 03/17/2023 09:19 AM    ALT 32 12/14/2022 08:18 AM    ALT 31 09/09/2022 09:34 AM    Albumin 4 6 03/17/2023 09:19 AM    Albumin 4 7 12/14/2022 08:18 AM    Albumin 4 7 09/09/2022 09:34 AM    Globulin, Total 2 3 03/17/2023 09:19 AM    Globulin, Total 2 3 12/14/2022 08:18 AM    Globulin, Total 2 0 09/09/2022 09:34 AM    HDL 41 03/17/2023 09:19 AM    HDL 42 09/09/2022 09:34 AM    HDL 35 (L) 06/10/2022 11:33 AM    Triglycerides 96 03/17/2023 09:19 AM    Triglycerides 115 09/09/2022 09:34 AM    Triglycerides 126 06/10/2022 11:33 AM     Portions of the record may have been created with voice recognition software  Occasional wrong word or "sound a like" substitutions may have occurred due to the inherent limitations of voice recognition software  Read the chart carefully and recognize, using context, where substitutions have occurred

## 2025-04-10 DIAGNOSIS — I10 ESSENTIAL HYPERTENSION: ICD-10-CM

## 2025-04-11 RX ORDER — HYDROCHLOROTHIAZIDE 12.5 MG/1
12.5 TABLET ORAL DAILY
Qty: 30 TABLET | Refills: 5 | Status: SHIPPED | OUTPATIENT
Start: 2025-04-11

## 2025-04-25 ENCOUNTER — RESULTS FOLLOW-UP (OUTPATIENT)
Dept: ENDOCRINOLOGY | Facility: HOSPITAL | Age: 60
End: 2025-04-25

## 2025-04-25 LAB
ALBUMIN SERPL-MCNC: 4.7 G/DL (ref 3.9–4.9)
ALP SERPL-CCNC: 72 IU/L (ref 44–121)
ALT SERPL-CCNC: 29 IU/L (ref 0–44)
AST SERPL-CCNC: 21 IU/L (ref 0–40)
BASOPHILS # BLD AUTO: 0.1 X10E3/UL (ref 0–0.2)
BASOPHILS NFR BLD AUTO: 1 %
BILIRUB SERPL-MCNC: 0.3 MG/DL (ref 0–1.2)
BUN SERPL-MCNC: 20 MG/DL (ref 8–27)
BUN/CREAT SERPL: 17 (ref 10–24)
CALCIUM SERPL-MCNC: 9.6 MG/DL (ref 8.6–10.2)
CHLORIDE SERPL-SCNC: 94 MMOL/L (ref 96–106)
CO2 SERPL-SCNC: 25 MMOL/L (ref 20–29)
CREAT SERPL-MCNC: 1.19 MG/DL (ref 0.76–1.27)
EGFR: 69 ML/MIN/1.73
EOSINOPHIL # BLD AUTO: 0.2 X10E3/UL (ref 0–0.4)
EOSINOPHIL NFR BLD AUTO: 3 %
ERYTHROCYTE [DISTWIDTH] IN BLOOD BY AUTOMATED COUNT: 12.5 % (ref 11.6–15.4)
EST. AVERAGE GLUCOSE BLD GHB EST-MCNC: 183 MG/DL
GLOBULIN SER-MCNC: 2.4 G/DL (ref 1.5–4.5)
GLUCOSE SERPL-MCNC: 216 MG/DL (ref 70–99)
HBA1C MFR BLD: 8 % (ref 4.8–5.6)
HCT VFR BLD AUTO: 43.5 % (ref 37.5–51)
HGB BLD-MCNC: 14.5 G/DL (ref 13–17.7)
IMM GRANULOCYTES # BLD: 0 X10E3/UL (ref 0–0.1)
IMM GRANULOCYTES NFR BLD: 0 %
LYMPHOCYTES # BLD AUTO: 1.6 X10E3/UL (ref 0.7–3.1)
LYMPHOCYTES NFR BLD AUTO: 25 %
MCH RBC QN AUTO: 30 PG (ref 26.6–33)
MCHC RBC AUTO-ENTMCNC: 33.3 G/DL (ref 31.5–35.7)
MCV RBC AUTO: 90 FL (ref 79–97)
MONOCYTES # BLD AUTO: 0.7 X10E3/UL (ref 0.1–0.9)
MONOCYTES NFR BLD AUTO: 11 %
NEUTROPHILS # BLD AUTO: 3.7 X10E3/UL (ref 1.4–7)
NEUTROPHILS NFR BLD AUTO: 60 %
PLATELET # BLD AUTO: 303 X10E3/UL (ref 150–450)
POTASSIUM SERPL-SCNC: 4.2 MMOL/L (ref 3.5–5.2)
PROT SERPL-MCNC: 7.1 G/DL (ref 6–8.5)
RBC # BLD AUTO: 4.83 X10E6/UL (ref 4.14–5.8)
SODIUM SERPL-SCNC: 131 MMOL/L (ref 134–144)
WBC # BLD AUTO: 6.2 X10E3/UL (ref 3.4–10.8)

## 2025-05-01 ENCOUNTER — OFFICE VISIT (OUTPATIENT)
Dept: ENDOCRINOLOGY | Facility: HOSPITAL | Age: 60
End: 2025-05-01
Payer: COMMERCIAL

## 2025-05-01 VITALS
HEIGHT: 70 IN | BODY MASS INDEX: 26.05 KG/M2 | HEART RATE: 105 BPM | SYSTOLIC BLOOD PRESSURE: 122 MMHG | DIASTOLIC BLOOD PRESSURE: 80 MMHG | WEIGHT: 182 LBS

## 2025-05-01 DIAGNOSIS — Z79.4 TYPE 2 DIABETES MELLITUS WITH RETINOPATHY, WITH LONG-TERM CURRENT USE OF INSULIN, MACULAR EDEMA PRESENCE UNSPECIFIED, UNSPECIFIED LATERALITY, UNSPECIFIED RETINOPATHY SEVERITY (HCC): Primary | ICD-10-CM

## 2025-05-01 DIAGNOSIS — E11.69 HYPERLIPIDEMIA ASSOCIATED WITH TYPE 2 DIABETES MELLITUS  (HCC): ICD-10-CM

## 2025-05-01 DIAGNOSIS — I10 ESSENTIAL HYPERTENSION: ICD-10-CM

## 2025-05-01 DIAGNOSIS — E11.319 TYPE 2 DIABETES MELLITUS WITH RETINOPATHY, WITH LONG-TERM CURRENT USE OF INSULIN, MACULAR EDEMA PRESENCE UNSPECIFIED, UNSPECIFIED LATERALITY, UNSPECIFIED RETINOPATHY SEVERITY (HCC): Primary | ICD-10-CM

## 2025-05-01 DIAGNOSIS — E78.5 HYPERLIPIDEMIA ASSOCIATED WITH TYPE 2 DIABETES MELLITUS  (HCC): ICD-10-CM

## 2025-05-01 DIAGNOSIS — E11.3293 MILD NONPROLIFERATIVE DIABETIC RETINOPATHY OF BOTH EYES WITHOUT MACULAR EDEMA ASSOCIATED WITH TYPE 2 DIABETES MELLITUS (HCC): ICD-10-CM

## 2025-05-01 DIAGNOSIS — E78.5 HYPERLIPIDEMIA, UNSPECIFIED HYPERLIPIDEMIA TYPE: ICD-10-CM

## 2025-05-01 PROCEDURE — 99214 OFFICE O/P EST MOD 30 MIN: CPT | Performed by: NURSE PRACTITIONER

## 2025-05-01 PROCEDURE — 95251 CONT GLUC MNTR ANALYSIS I&R: CPT | Performed by: NURSE PRACTITIONER

## 2025-05-01 RX ORDER — INSULIN GLARGINE 100 [IU]/ML
INJECTION, SOLUTION SUBCUTANEOUS
Start: 2025-05-01

## 2025-05-01 NOTE — ASSESSMENT & PLAN NOTE
Lab Results   Component Value Date    HGBA1C 8.0 (H) 04/24/2025       Orders:    Albumin / creatinine urine ratio; Future    CBC and differential; Future    Comprehensive metabolic panel; Future    Hemoglobin A1C; Future    Lipid panel; Future    Insulin Glargine Solostar (Lantus SoloStar) 100 UNIT/ML SOPN; INJECT 44 UNITS UNDER THE SKIN IN THE MORNING AND 38 UNITS IN THE EVENING.  This is a dosage change.

## 2025-05-01 NOTE — ASSESSMENT & PLAN NOTE
Orders:    Albumin / creatinine urine ratio; Future    CBC and differential; Future    Comprehensive metabolic panel; Future    Hemoglobin A1C; Future    Lipid panel; Future    Insulin Glargine Solostar (Lantus SoloStar) 100 UNIT/ML SOPN; INJECT 44 UNITS UNDER THE SKIN IN THE MORNING AND 38 UNITS IN THE EVENING.  This is a dosage change.

## 2025-05-01 NOTE — ASSESSMENT & PLAN NOTE
Lab Results   Component Value Date    HGBA1C 8.0 (H) 04/24/2025       Orders:    Albumin / creatinine urine ratio; Future    CBC and differential; Future    Comprehensive metabolic panel; Future    Hemoglobin A1C; Future    Lipid panel; Future

## 2025-05-01 NOTE — PROGRESS NOTES
Name: Alberto Senior      : 1965      MRN: [id number]  Encounter Provider: PREETHI Villegas  Encounter Date: 2025   Encounter department: [facility] [place]      :  Assessment & Plan  Type 2 diabetes mellitus with retinopathy, with long-term current use of insulin, macular edema presence unspecified, unspecified laterality, unspecified retinopathy severity (HCC)    Lab Results   Component Value Date    HGBA1C 8.0 (H) 2025       Orders:    Albumin / creatinine urine ratio; Future    CBC and differential; Future    Comprehensive metabolic panel; Future    Hemoglobin A1C; Future    Lipid panel; Future    Insulin Glargine Solostar (Lantus SoloStar) 100 UNIT/ML SOPN; INJECT 44 UNITS UNDER THE SKIN IN THE MORNING AND 38 UNITS IN THE EVENING.  This is a dosage change.    Essential hypertension    Orders:    Albumin / creatinine urine ratio; Future    CBC and differential; Future    Comprehensive metabolic panel; Future    Hemoglobin A1C; Future    Lipid panel; Future    Insulin Glargine Solostar (Lantus SoloStar) 100 UNIT/ML SOPN; INJECT 44 UNITS UNDER THE SKIN IN THE MORNING AND 38 UNITS IN THE EVENING.  This is a dosage change.    Hyperlipidemia, unspecified hyperlipidemia type    Orders:    Albumin / creatinine urine ratio; Future    CBC and differential; Future    Comprehensive metabolic panel; Future    Hemoglobin A1C; Future    Lipid panel; Future    Insulin Glargine Solostar (Lantus SoloStar) 100 UNIT/ML SOPN; INJECT 44 UNITS UNDER THE SKIN IN THE MORNING AND 38 UNITS IN THE EVENING.  This is a dosage change.    Mild nonproliferative diabetic retinopathy of both eyes without macular edema associated with type 2 diabetes mellitus (HCC)    Lab Results   Component Value Date    HGBA1C 8.0 (H) 2025       Orders:    Albumin / creatinine urine ratio; Future    CBC and differential; Future    Comprehensive metabolic panel; Future    Hemoglobin A1C;  Future    Lipid panel; Future    Hyperlipidemia associated with type 2 diabetes mellitus  (HCC)    Lab Results   Component Value Date    HGBA1C 8.0 (H) 04/24/2025       Orders:    Albumin / creatinine urine ratio; Future    CBC and differential; Future    Comprehensive metabolic panel; Future    Hemoglobin A1C; Future    Lipid panel; Future      Plan:  1.  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   For now, continue current regimen but increase evening Lantus to 38 units to help with morning blood sugar.  Refocus on diet and exercise.  He will continue to utilize the freestyle jeremy and for the report to the office in 2 weeks for review and further adjustment, if necessary.  Check hemoglobin A1c prior to next visit.        2.  Hypertension: He is continuing to follow up with his PCP for management.  Continue lisinopril-HCTZ.  Check comprehensive metabolic panel prior to next visit.     3.  Hyperlipidemia: LDL is elevated.  PCP requested that he stop atorvastatin due to leg cramps.          History of Present Illness     59 y.o. year old male with type 2 diabetes for approximately 20 years.  He is on insulin at home and takes Lantus 32 units in the evening and 44 units in the morning with NovoLog 1 unit for every 10 g of carbs with breakfast and lunch with 1:8 at dinner and snack.  Her most recent hemoglobin A1c from April 24, 2025 is 8.0. He states that his blood sugar readings have been high at times throughout the fall as he has been traveling for work and has been eating out more with snacking. He denies any recent episodes of hypoglycemia, polyuria, polydipsia, nocturia and blurry vision.  He denies neuropathy but does admit to neuropathy and retinopathy.      He obtained his annual diabetic eye exam 0n February 5, 2025, by his report.  He has mild retinopathy. He is getting injections to treat retinopathy. He complains of some discomfort to his feet  intermittently but does not follow podiatry for regular diabetic foot care.  Diabetic foot exam was performed at last office visit on June 20, 2024.       Blood Sugar/Glucometer/Pump/CGM review: Freestyle jeremy download from April 18 through May 1, 2025 reveals an average glucose of 165 with a glucose variability of 27.3.  He is in target range 67% of the time with 33% elevated readings and no hypoglycemia.     For his hypertension, he is treated with lisinopril 10 mg daily.  He states his compliance with his lisinopril has improved.     His hyperlipidemia is treated with atorvastatin 40 mg daily.    Last Eye Exam: 02/05/2025  Last Foot Exam: 06/20/2024  Health Maintenance   Topic Date Due    Diabetic Foot Exam  06/20/2025    Diabetic Eye Exam  02/05/2026           Review of Systems   Constitutional: Negative.  Negative for chills, fatigue and fever.   HENT:  Positive for hearing loss. Negative for trouble swallowing and voice change.    Eyes:  Negative for photophobia, pain, discharge, redness, itching and visual disturbance.   Respiratory:  Negative for cough and shortness of breath.    Cardiovascular:  Negative for chest pain and palpitations.   Gastrointestinal:  Negative for abdominal pain, constipation, diarrhea, nausea and vomiting.   Endocrine: Negative for cold intolerance, heat intolerance, polydipsia, polyphagia and polyuria.   Genitourinary: Negative.    Musculoskeletal:  Positive for arthralgias and myalgias.   Skin: Negative.    Allergic/Immunologic: Negative.    Neurological:  Negative for syncope, light-headedness and headaches.   Hematological: Negative.    Psychiatric/Behavioral: Negative.     All other systems reviewed and are negative.   as per HPI        Objective   There were no vitals taken for this visit.     There is no height or weight on file to calculate BMI.  Wt Readings from Last 3 Encounters:   01/22/25 84.8 kg (187 lb)   11/18/24 83.8 kg (184 lb 12.8 oz)   09/26/24 82.1 kg (181 lb)      Physical Exam  Vitals reviewed.   Constitutional:       Appearance: He is well-developed.   HENT:      Head: Normocephalic and atraumatic.      Nose: Nose normal.   Eyes:      Conjunctiva/sclera: Conjunctivae normal.      Pupils: Pupils are equal, round, and reactive to light.   Cardiovascular:      Rate and Rhythm: Normal rate and regular rhythm.      Heart sounds: Normal heart sounds.   Pulmonary:      Effort: Pulmonary effort is normal.      Breath sounds: Normal breath sounds.   Abdominal:      General: Bowel sounds are normal.      Palpations: Abdomen is soft.   Musculoskeletal:         General: Normal range of motion.      Cervical back: Normal range of motion and neck supple.   Skin:     General: Skin is warm and dry.   Neurological:      Mental Status: He is alert and oriented to person, place, and time.   Psychiatric:         Behavior: Behavior normal.         Thought Content: Thought content normal.         Judgment: Judgment normal.         Labs:   Lab Results   Component Value Date    HGBA1C 8.0 (H) 04/24/2025    HGBA1C 7.8 (H) 01/17/2025    HGBA1C 7.3 (H) 08/01/2024     Lab Results   Component Value Date    CREATININE 1.19 04/24/2025    CREATININE 1.05 02/11/2025    CREATININE 1.19 01/17/2025    BUN 20 04/24/2025     (L) 10/10/2017    K 4.2 04/24/2025    CL 94 (L) 04/24/2025    CO2 25 04/24/2025     eGFR   Date Value Ref Range Status   04/24/2025 69 >59 mL/min/1.73 Final     Lab Results   Component Value Date    HDL 44 01/17/2025    TRIG 98 01/17/2025    CHOLHDL 4.3 01/17/2025     Lab Results   Component Value Date    ALT 29 04/24/2025    AST 21 04/24/2025         There are no Patient Instructions on file for this visit.    Discussed with the patient and all questioned fully answered. He will call me if any problems arise.

## 2025-05-01 NOTE — PATIENT INSTRUCTIONS
Be mindful of diet.      Exercise regularly and stay hydrated.      Continue NovoLog at current dose.     Continue morning Lantus 44 units and increase to 38 units in the evening.     As discussed, when traveling and eating larger dinners, please utilize a 1:6 insulin to carbohydrate ratio.     Make sure you are injecting insulin consistently BEFORE MEALS.     Continue to utilize the freestyle jeremy.     Contact the office with any consistent hypoglycemia.     Continue Lisinopril HCTZ.     Send a record of your blood pressures to the office in 2-3 weeks for review.     Continue atorvastatin.     Reassess lab work in 1-2 weeks for sodium level.     Obtain lab work prior to next visit.

## 2025-05-01 NOTE — ASSESSMENT & PLAN NOTE
Lab Results   Component Value Date    HGBA1C 8.0 (H) 04/24/2025       Orders:    Albumin / creatinine urine ratio; Future    CBC and differential; Future    Comprehensive metabolic panel; Future    Hemoglobin A1C; Future    Lipid panel; Future      Plan:  1.  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   For now, continue current regimen but increase evening Lantus to 38 units to help with morning blood sugar.  Refocus on diet and exercise.  He will continue to utilize the freestyle jeremy and for the report to the office in 2 weeks for review and further adjustment, if necessary.  Check hemoglobin A1c prior to next visit.        2.  Hypertension: He is continuing to follow up with his PCP for management.  Continue lisinopril-HCTZ.  Check comprehensive metabolic panel prior to next visit.     3.  Hyperlipidemia: LDL is elevated.  PCP requested that he stop atorvastatin due to leg cramps.

## 2025-05-19 ENCOUNTER — OFFICE VISIT (OUTPATIENT)
Dept: FAMILY MEDICINE CLINIC | Facility: HOSPITAL | Age: 60
End: 2025-05-19
Payer: COMMERCIAL

## 2025-05-19 VITALS
HEIGHT: 70 IN | DIASTOLIC BLOOD PRESSURE: 88 MMHG | TEMPERATURE: 97.2 F | WEIGHT: 185.4 LBS | HEART RATE: 92 BPM | SYSTOLIC BLOOD PRESSURE: 138 MMHG | BODY MASS INDEX: 26.54 KG/M2 | OXYGEN SATURATION: 98 %

## 2025-05-19 DIAGNOSIS — E11.319 TYPE 2 DIABETES MELLITUS WITH RETINOPATHY, WITH LONG-TERM CURRENT USE OF INSULIN, MACULAR EDEMA PRESENCE UNSPECIFIED, UNSPECIFIED LATERALITY, UNSPECIFIED RETINOPATHY SEVERITY (HCC): ICD-10-CM

## 2025-05-19 DIAGNOSIS — Z79.4 TYPE 2 DIABETES MELLITUS WITH RETINOPATHY, WITH LONG-TERM CURRENT USE OF INSULIN, MACULAR EDEMA PRESENCE UNSPECIFIED, UNSPECIFIED LATERALITY, UNSPECIFIED RETINOPATHY SEVERITY (HCC): ICD-10-CM

## 2025-05-19 DIAGNOSIS — I10 ESSENTIAL HYPERTENSION: Primary | ICD-10-CM

## 2025-05-19 DIAGNOSIS — E78.5 HYPERLIPIDEMIA ASSOCIATED WITH TYPE 2 DIABETES MELLITUS  (HCC): ICD-10-CM

## 2025-05-19 DIAGNOSIS — E11.69 HYPERLIPIDEMIA ASSOCIATED WITH TYPE 2 DIABETES MELLITUS  (HCC): ICD-10-CM

## 2025-05-19 DIAGNOSIS — Z00.00 ANNUAL PHYSICAL EXAM: ICD-10-CM

## 2025-05-19 PROCEDURE — 99213 OFFICE O/P EST LOW 20 MIN: CPT | Performed by: NURSE PRACTITIONER

## 2025-05-19 PROCEDURE — 99396 PREV VISIT EST AGE 40-64: CPT | Performed by: NURSE PRACTITIONER

## 2025-05-19 NOTE — PATIENT INSTRUCTIONS
"Patient Education     Routine physical for adults   The Basics   Written by the doctors and editors at [facility]   What is a physical? -- A physical is a routine visit, or \"check-up,\" with your doctor. You might also hear it called a \"wellness visit\" or \"preventive visit.\"  During each visit, the doctor will:   Ask about your physical and mental health   Ask about your habits, behaviors, and lifestyle   Do an exam   Give you vaccines if needed   Talk to you about any medicines you take   Give advice about your health   Answer your questions  Getting regular check-ups is an important part of taking care of your health. It can help your doctor find and treat any problems you have. But it's also important for preventing health problems.  A routine physical is different from a \"sick visit.\" A sick visit is when you see a doctor because of a health concern or problem. Since physicals are scheduled ahead of time, you can think about what you want to ask the doctor.  How often should I get a physical? -- It depends on your age and health. In general, for people age 21 years and older:   If you are younger than 50 years, you might be able to get a physical every 3 years.   If you are 50 years or older, your doctor might recommend a physical every year.  If you have an ongoing health condition, like diabetes or high blood pressure, your doctor will probably want to see you more often.  What happens during a physical? -- In general, each visit will include:   Physical exam - The doctor or nurse will check your height, weight, heart rate, and blood pressure. They will also look at your eyes and ears. They will ask about how you are feeling and whether you have any symptoms that bother you.   Medicines - It's a good idea to bring a list of all the medicines you take to each doctor visit. Your doctor will talk to you about your medicines and answer any questions. Tell them if you are having any side effects that bother you. You " "should also tell them if you are having trouble paying for any of your medicines.   Habits and behaviors - This includes:   Your diet   Your exercise habits   Whether you smoke, drink alcohol, or use drugs   Whether you are sexually active   Whether you feel safe at home  Your doctor will talk to you about things you can do to improve your health and lower your risk of health problems. They will also offer help and support. For example, if you want to quit smoking, they can give you advice and might prescribe medicines. If you want to improve your diet or get more physical activity, they can help you with this, too.   Lab tests, if needed - The tests you get will depend on your age and situation. For example, your doctor might want to check your:   Cholesterol   Blood sugar   Iron level   Vaccines - The recommended vaccines will depend on your age, health, and what vaccines you already had. Vaccines are very important because they can prevent certain serious or deadly infections.   Discussion of screening - \"Screening\" means checking for diseases or other health problems before they cause symptoms. Your doctor can recommend screening based on your age, risk, and preferences. This might include tests to check for:   Cancer, such as breast, prostate, cervical, ovarian, colorectal, prostate, lung, or skin cancer   Sexually transmitted infections, such as chlamydia and gonorrhea   Mental health conditions like depression and anxiety  Your doctor will talk to you about the different types of screening tests. They can help you decide which screenings to have. They can also explain what the results might mean.   Answering questions - The physical is a good time to ask the doctor or nurse questions about your health. If needed, they can refer you to other doctors or specialists, too.  Adults older than 65 years often need other care, too. As you get older, your doctor will talk to you about:   How to prevent falling at " home   Hearing or vision tests   Memory testing   How to take your medicines safely   Making sure that you have the help and support you need at home  All topics are updated as new evidence becomes available and our peer review process is complete.  This topic retrieved from [employer] on: May 02, 2024.  Topic 037977 Version 1.0  Release: 32.4.3 - C32.122  © 2024 UpToDate, Inc. and/or its affiliates. All rights reserved.  Consumer Information Use and Disclaimer   Disclaimer: This generalized information is a limited summary of diagnosis, treatment, and/or medication information. It is not meant to be comprehensive and should be used as a tool to help the user understand and/or assess potential diagnostic and treatment options. It does NOT include all information about conditions, treatments, medications, side effects, or risks that may apply to a specific patient. It is not intended to be medical advice or a substitute for the medical advice, diagnosis, or treatment of a health care provider based on the health care provider's examination and assessment of a patient's specific and unique circumstances. Patients must speak with a health care provider for complete information about their health, medical questions, and treatment options, including any risks or benefits regarding use of medications. This information does not endorse any treatments or medications as safe, effective, or approved for treating a specific patient. UpToDate, Inc. and its affiliates disclaim any warranty or liability relating to this information or the use thereof.The use of this information is governed by the Terms of Use, available at https://www.[employer]uwer.com/en/know/clinical-effectiveness-terms. 2024© UpToDate, Inc. and its affiliates and/or licensors. All rights reserved.  Copyright   © 2024 UpToDate, Inc. and/or its affiliates. All rights reserved.

## 2025-05-19 NOTE — PROGRESS NOTES
Adult Annual Physical  Name: Alberto Senior      : 1965      MRN: [id number]  Encounter Provider: PREETHI Hall  Encounter Date: 2025   Encounter department: [facility] [id number]     :  Assessment & Plan  Essential hypertension  Bp initially elevated but slight improvement on recheck.   His home readings are well controlled. Instructed to call if consistently > 140/90  No change to current regimen.   Plan to f/u in 6 months.        Type 2 diabetes mellitus with retinopathy, with long-term current use of insulin, macular edema presence unspecified, unspecified laterality, unspecified retinopathy severity (HCC)    Lab Results   Component Value Date    HGBA1C 8.0 (H) 2025     A1C has increased likely due to poor diet over the last few months.   He has upcoming blood work for endo to be done in August.   Managed by endo.   Foot exam done today.   UTD with eye exam. Urine microalbumin ordered with next set of labs.   Orders:    Ambulatory referral to Endocrinology; Future    Hyperlipidemia associated with type 2 diabetes mellitus  (HCC)    Lab Results   Component Value Date    HGBA1C 8.0 (H) 2025     Last LDL was elevated above goal.   He reports consistency with statin.   FLP ordered for August through endo.        Annual physical exam             Preventive Screenings:  - Diabetes Screening: screening not indicated and has diabetes  - Cholesterol Screening: screening not indicated and has hyperlipidemia   - Hepatitis C screening: screening up-to-date   - Colon cancer screening: screening up-to-date   - Lung cancer screening: screening not indicated   - Prostate cancer screening: screening up-to-date     Counseling/Anticipatory Guidance:    - Diet: discussed recommendations for a healthy/well-balanced diet.   - Exercise: the importance of regular exercise/physical activity was discussed. Recommend exercise 3-5 times per week for at least 30 minutes.   - Injury  "prevention: discussed safety/seat belts, safety helmets, smoke detectors, carbon monoxide detectors, and smoking near bedding or upholstery.       Depression Screening and Follow-up Plan: Patient was screened for depression during today's encounter. They screened negative with a PHQ-2 score of 0.          History of Present Illness     Adult Annual Physical:  Patient presents for annual physical. Follows with endo for DM. A1C is up to 8.0. he reports diet has been poor due to travel with work.   He does check BP occasionally at home. Reports the last 2 weeks has been 129-140/71-83 in the morning and 120-134/65-72 in the PM. .     Diet and Physical Activity:  - Diet/Nutrition: no special diet, well balanced diet and low carb diet.  - Exercise: no formal exercise.    Depression Screening:  - PHQ-2 Score: 0    General Health:  - Sleep: sleeps poorly, 4-6 hours of sleep on average and unrefreshing sleep.  - Hearing: decreased hearing left ear and tinnitus.  - Vision: wears glasses and vision problems. retina  - Dental: no dental visits for > 1 year and brushes teeth twice daily. dentures     Health:    - Urinary symptoms: urinary frequency and nocturia.     Advanced Care Planning:  - Has an advanced directive?: yes      Review of Systems   Constitutional: Negative.    HENT: Negative.     Eyes: Negative.    Respiratory: Negative.     Cardiovascular: Negative.    Gastrointestinal: Negative.    Endocrine: Negative.    Genitourinary: Negative.    Musculoskeletal: Negative.    Skin: Negative.    Neurological: Negative.    Hematological: Negative.    Psychiatric/Behavioral: Negative.           Objective   /82 (Patient Position: Sitting, Cuff Size: Standard)   Pulse 92   Temp (!) 97.2 °F (36.2 °C) (Tympanic)   Ht 5' 10\" (1.778 m)   Wt 84.1 kg (185 lb 6.4 oz)   SpO2 98%   BMI 26.60 kg/m²     Physical Exam  Vitals reviewed.   Constitutional:       Appearance: Normal appearance. He is normal weight.   HENT:      " Head: Normocephalic and atraumatic.      Right Ear: Tympanic membrane, ear canal and external ear normal.      Left Ear: Tympanic membrane, ear canal and external ear normal.      Nose: Nose normal.      Mouth/Throat:      Mouth: Mucous membranes are moist.      Pharynx: Oropharynx is clear.     Eyes:      Conjunctiva/sclera: Conjunctivae normal.      Pupils: Pupils are equal, round, and reactive to light.     Neck:      Thyroid: No thyromegaly.     Cardiovascular:      Rate and Rhythm: Normal rate and regular rhythm.      Pulses: no weak pulses.           Dorsalis pedis pulses are 2+ on the right side and 2+ on the left side.        Posterior tibial pulses are 2+ on the right side and 2+ on the left side.      Heart sounds: Normal heart sounds. No murmur heard.  Pulmonary:      Effort: Pulmonary effort is normal.      Breath sounds: Normal breath sounds.   Abdominal:      General: Abdomen is flat. Bowel sounds are normal.      Palpations: Abdomen is soft. There is no hepatomegaly or splenomegaly.      Tenderness: There is no abdominal tenderness.     Musculoskeletal:         General: Normal range of motion.      Cervical back: Normal range of motion and neck supple.   Feet:      Right foot:      Skin integrity: No ulcer, skin breakdown, erythema, warmth, callus or dry skin.      Left foot:      Skin integrity: No ulcer, skin breakdown, erythema, warmth, callus or dry skin.     Skin:     General: Skin is warm and dry.      Capillary Refill: Capillary refill takes less than 2 seconds.     Neurological:      General: No focal deficit present.      Mental Status: He is alert and oriented to person, place, and time.     Psychiatric:         Mood and Affect: Mood normal.         Behavior: Behavior normal.         Thought Content: Thought content normal.         Judgment: Judgment normal.     Patient's shoes and socks removed.    Right Foot/Ankle   Right Foot Inspection  Skin Exam: skin normal and skin intact. No dry  skin, no warmth, no callus, no erythema, no maceration, no abnormal color, no pre-ulcer, no ulcer and no callus.     Toe Exam: ROM and strength within normal limits.     Sensory   Vibration: intact  Monofilament testing: intact    Vascular  Capillary refills: < 3 seconds  The right DP pulse is 2+. The right PT pulse is 2+.     Left Foot/Ankle  Left Foot Inspection  Skin Exam: skin normal and skin intact. No dry skin, no warmth, no erythema, no maceration, normal color, no pre-ulcer, no ulcer and no callus.     Toe Exam: ROM and strength within normal limits.     Sensory   Vibration: intact  Monofilament testing: intact    Vascular  Capillary refills: < 3 seconds  The left DP pulse is 2+. The left PT pulse is 2+.     Assign Risk Category  No deformity present  No loss of protective sensation  No weak pulses  Risk: 0

## 2025-05-19 NOTE — ASSESSMENT & PLAN NOTE
Lab Results   Component Value Date    HGBA1C 8.0 (H) 04/24/2025     A1C has increased likely due to poor diet over the last few months.   He has upcoming blood work for endo to be done in August.   Managed by [facility].   Foot exam done today.   UTD with eye exam. Urine microalbumin ordered with next set of labs.   Orders:    Ambulatory referral to Endocrinology; Future

## 2025-05-19 NOTE — ASSESSMENT & PLAN NOTE
Bp initially elevated but slight improvement on recheck.   His home readings are well controlled. Instructed to call if consistently > 140/90  No change to current regimen.   Plan to f/u in 6 months.

## 2025-05-19 NOTE — ASSESSMENT & PLAN NOTE
Lab Results   Component Value Date    HGBA1C 8.0 (H) 04/24/2025     Last LDL was elevated above goal.   He reports consistency with statin.   FLP ordered for August through endo.

## 2025-05-30 DIAGNOSIS — Z79.4 TYPE 2 DIABETES MELLITUS WITH RETINOPATHY, WITH LONG-TERM CURRENT USE OF INSULIN, MACULAR EDEMA PRESENCE UNSPECIFIED, UNSPECIFIED LATERALITY, UNSPECIFIED RETINOPATHY SEVERITY (HCC): ICD-10-CM

## 2025-05-30 DIAGNOSIS — E11.319 TYPE 2 DIABETES MELLITUS WITH RETINOPATHY, WITH LONG-TERM CURRENT USE OF INSULIN, MACULAR EDEMA PRESENCE UNSPECIFIED, UNSPECIFIED LATERALITY, UNSPECIFIED RETINOPATHY SEVERITY (HCC): ICD-10-CM

## 2025-05-30 DIAGNOSIS — I10 ESSENTIAL HYPERTENSION: ICD-10-CM

## 2025-05-30 DIAGNOSIS — E78.5 HYPERLIPIDEMIA, UNSPECIFIED HYPERLIPIDEMIA TYPE: ICD-10-CM

## 2025-05-30 RX ORDER — ATORVASTATIN CALCIUM 40 MG/1
40 TABLET, FILM COATED ORAL DAILY
Qty: 30 TABLET | Refills: 5 | Status: SHIPPED | OUTPATIENT
Start: 2025-05-30

## 2025-06-19 ENCOUNTER — TELEPHONE (OUTPATIENT)
Dept: FAMILY MEDICINE CLINIC | Facility: HOSPITAL | Age: 60
End: 2025-06-19

## 2025-06-19 ENCOUNTER — TELEPHONE (OUTPATIENT)
Age: 60
End: 2025-06-19

## 2025-06-19 NOTE — TELEPHONE ENCOUNTER
Location/Facility Name/Address/Phone Number: [facility]   [address] [address]   Tel: [phone]    Date of service: 6/25/2025      Best phone to reach the patient: [phone]

## 2025-07-23 DIAGNOSIS — F41.0 PANIC DISORDER: ICD-10-CM

## 2025-07-25 RX ORDER — IMIPRAMINE HYDROCHLORIDE 50 MG/1
50 TABLET, FILM COATED ORAL DAILY
Qty: 90 TABLET | Refills: 0 | Status: SHIPPED | OUTPATIENT
Start: 2025-07-25

## 2025-07-29 DIAGNOSIS — I10 ESSENTIAL HYPERTENSION: ICD-10-CM

## 2025-07-30 RX ORDER — LISINOPRIL AND HYDROCHLOROTHIAZIDE 12.5; 2 MG/1; MG/1
1 TABLET ORAL DAILY
Qty: 30 TABLET | Refills: 5 | Status: SHIPPED | OUTPATIENT
Start: 2025-07-30

## 2025-08-08 LAB
ALBUMIN SERPL-MCNC: 4.4 G/DL (ref 3.9–4.9)
ALBUMIN/CREAT UR: 9 MG/G CREAT (ref 0–29)
ALP SERPL-CCNC: 61 IU/L (ref 44–121)
ALT SERPL-CCNC: 27 IU/L (ref 0–44)
AST SERPL-CCNC: 23 IU/L (ref 0–40)
BASOPHILS # BLD AUTO: 0.1 X10E3/UL (ref 0–0.2)
BASOPHILS NFR BLD AUTO: 1 %
BILIRUB SERPL-MCNC: 0.4 MG/DL (ref 0–1.2)
BUN SERPL-MCNC: 19 MG/DL (ref 8–27)
BUN/CREAT SERPL: 16 (ref 10–24)
CALCIUM SERPL-MCNC: 9.3 MG/DL (ref 8.6–10.2)
CHLORIDE SERPL-SCNC: 93 MMOL/L (ref 96–106)
CHOLEST SERPL-MCNC: 153 MG/DL (ref 100–199)
CHOLEST/HDLC SERPL: 3.6 RATIO (ref 0–5)
CO2 SERPL-SCNC: 24 MMOL/L (ref 20–29)
CREAT SERPL-MCNC: 1.16 MG/DL (ref 0.76–1.27)
CREAT UR-MCNC: 117.7 MG/DL
EGFR: 71 ML/MIN/1.73
EOSINOPHIL # BLD AUTO: 0.2 X10E3/UL (ref 0–0.4)
EOSINOPHIL NFR BLD AUTO: 3 %
ERYTHROCYTE [DISTWIDTH] IN BLOOD BY AUTOMATED COUNT: 13.1 % (ref 11.6–15.4)
EST. AVERAGE GLUCOSE BLD GHB EST-MCNC: 174 MG/DL
GLOBULIN SER-MCNC: 2.5 G/DL (ref 1.5–4.5)
GLUCOSE SERPL-MCNC: 147 MG/DL (ref 70–99)
HBA1C MFR BLD: 7.7 % (ref 4.8–5.6)
HCT VFR BLD AUTO: 41.8 % (ref 37.5–51)
HDLC SERPL-MCNC: 42 MG/DL
HGB BLD-MCNC: 13.7 G/DL (ref 13–17.7)
IMM GRANULOCYTES # BLD: 0 X10E3/UL (ref 0–0.1)
IMM GRANULOCYTES NFR BLD: 0 %
LDLC SERPL CALC-MCNC: 93 MG/DL (ref 0–99)
LYMPHOCYTES # BLD AUTO: 1.4 X10E3/UL (ref 0.7–3.1)
LYMPHOCYTES NFR BLD AUTO: 25 %
MCH RBC QN AUTO: 29.8 PG (ref 26.6–33)
MCHC RBC AUTO-ENTMCNC: 32.8 G/DL (ref 31.5–35.7)
MCV RBC AUTO: 91 FL (ref 79–97)
MICROALBUMIN UR-MCNC: 10.4 UG/ML
MONOCYTES # BLD AUTO: 0.7 X10E3/UL (ref 0.1–0.9)
MONOCYTES NFR BLD AUTO: 12 %
NEUTROPHILS # BLD AUTO: 3.4 X10E3/UL (ref 1.4–7)
NEUTROPHILS NFR BLD AUTO: 59 %
PLATELET # BLD AUTO: 279 X10E3/UL (ref 150–450)
POTASSIUM SERPL-SCNC: 4.9 MMOL/L (ref 3.5–5.2)
PROT SERPL-MCNC: 6.9 G/DL (ref 6–8.5)
RBC # BLD AUTO: 4.59 X10E6/UL (ref 4.14–5.8)
SL AMB VLDL CHOLESTEROL CALC: 18 MG/DL (ref 5–40)
SODIUM SERPL-SCNC: 131 MMOL/L (ref 134–144)
TRIGL SERPL-MCNC: 98 MG/DL (ref 0–149)
WBC # BLD AUTO: 5.8 X10E3/UL (ref 3.4–10.8)

## 2025-08-12 ENCOUNTER — OFFICE VISIT (OUTPATIENT)
Dept: ENDOCRINOLOGY | Facility: HOSPITAL | Age: 60
End: 2025-08-12
Payer: COMMERCIAL